# Patient Record
Sex: MALE | Race: WHITE | Employment: OTHER | URBAN - NONMETROPOLITAN AREA
[De-identification: names, ages, dates, MRNs, and addresses within clinical notes are randomized per-mention and may not be internally consistent; named-entity substitution may affect disease eponyms.]

---

## 2017-02-02 ENCOUNTER — OFFICE VISIT (OUTPATIENT)
Dept: GASTROENTEROLOGY | Age: 72
End: 2017-02-02
Payer: MEDICARE

## 2017-02-02 VITALS
WEIGHT: 231 LBS | DIASTOLIC BLOOD PRESSURE: 72 MMHG | BODY MASS INDEX: 37.12 KG/M2 | SYSTOLIC BLOOD PRESSURE: 130 MMHG | HEIGHT: 66 IN | RESPIRATION RATE: 20 BRPM | HEART RATE: 72 BPM | OXYGEN SATURATION: 97 %

## 2017-02-02 DIAGNOSIS — K59.00 CONSTIPATION, UNSPECIFIED CONSTIPATION TYPE: ICD-10-CM

## 2017-02-02 DIAGNOSIS — R14.0 SENSATION OF GASEOUS ABDOMINAL FULLNESS: ICD-10-CM

## 2017-02-02 DIAGNOSIS — R14.0 GASEOUS ABDOMINAL DISTENTION: ICD-10-CM

## 2017-02-02 DIAGNOSIS — R14.0 ABDOMINAL BLOATING: Primary | ICD-10-CM

## 2017-02-02 DIAGNOSIS — R11.0 NAUSEA: ICD-10-CM

## 2017-02-02 PROCEDURE — 99214 OFFICE O/P EST MOD 30 MIN: CPT | Performed by: NURSE PRACTITIONER

## 2017-02-02 PROCEDURE — 1123F ACP DISCUSS/DSCN MKR DOCD: CPT | Performed by: NURSE PRACTITIONER

## 2017-02-02 PROCEDURE — G8484 FLU IMMUNIZE NO ADMIN: HCPCS | Performed by: NURSE PRACTITIONER

## 2017-02-02 PROCEDURE — G8419 CALC BMI OUT NRM PARAM NOF/U: HCPCS | Performed by: NURSE PRACTITIONER

## 2017-02-02 PROCEDURE — 1036F TOBACCO NON-USER: CPT | Performed by: NURSE PRACTITIONER

## 2017-02-02 PROCEDURE — G8598 ASA/ANTIPLAT THER USED: HCPCS | Performed by: NURSE PRACTITIONER

## 2017-02-02 PROCEDURE — 3017F COLORECTAL CA SCREEN DOC REV: CPT | Performed by: NURSE PRACTITIONER

## 2017-02-02 PROCEDURE — 4040F PNEUMOC VAC/ADMIN/RCVD: CPT | Performed by: NURSE PRACTITIONER

## 2017-02-02 PROCEDURE — G8427 DOCREV CUR MEDS BY ELIG CLIN: HCPCS | Performed by: NURSE PRACTITIONER

## 2017-02-06 ENCOUNTER — TELEPHONE (OUTPATIENT)
Dept: CARDIOLOGY | Age: 72
End: 2017-02-06

## 2017-02-06 DIAGNOSIS — I25.10 ATHEROSCLEROSIS OF NATIVE CORONARY ARTERY OF NATIVE HEART WITHOUT ANGINA PECTORIS: ICD-10-CM

## 2017-02-06 DIAGNOSIS — I25.5 CARDIOMYOPATHY, ISCHEMIC: Primary | Chronic | ICD-10-CM

## 2017-02-06 DIAGNOSIS — Z01.818 PREOPERATIVE CLEARANCE: ICD-10-CM

## 2017-02-06 DIAGNOSIS — I10 ESSENTIAL HYPERTENSION: ICD-10-CM

## 2017-02-07 ENCOUNTER — APPOINTMENT (OUTPATIENT)
Dept: CT IMAGING | Age: 72
DRG: 392 | End: 2017-02-07
Attending: FAMILY MEDICINE
Payer: MEDICARE

## 2017-02-07 ENCOUNTER — HOSPITAL ENCOUNTER (INPATIENT)
Age: 72
LOS: 3 days | Discharge: HOME | DRG: 392 | End: 2017-02-10
Attending: FAMILY MEDICINE | Admitting: FAMILY MEDICINE
Payer: MEDICARE

## 2017-02-07 ENCOUNTER — APPOINTMENT (OUTPATIENT)
Dept: GENERAL RADIOLOGY | Age: 72
DRG: 392 | End: 2017-02-07
Attending: FAMILY MEDICINE
Payer: MEDICARE

## 2017-02-07 LAB
ALBUMIN SERPL-MCNC: 3.5 G/DL (ref 3.5–5.2)
ALP BLD-CCNC: 88 U/L (ref 40–130)
ALT SERPL-CCNC: 17 U/L (ref 5–41)
AMYLASE: 38 U/L (ref 28–100)
ANION GAP SERPL CALCULATED.3IONS-SCNC: 15 MMOL/L (ref 7–19)
AST SERPL-CCNC: 20 U/L (ref 5–40)
BASOPHILS ABSOLUTE: 0.1 K/UL (ref 0–0.2)
BASOPHILS RELATIVE PERCENT: 0.9 % (ref 0–1)
BILIRUB SERPL-MCNC: <0.2 MG/DL (ref 0.2–1.2)
BILIRUBIN URINE: NEGATIVE
BLOOD, URINE: NEGATIVE
BUN BLDV-MCNC: 19 MG/DL (ref 8–23)
CALCIUM SERPL-MCNC: 8.8 MG/DL (ref 8.8–10.2)
CHLORIDE BLD-SCNC: 100 MMOL/L (ref 98–111)
CLARITY: CLEAR
CO2: 23 MMOL/L (ref 22–29)
COLOR: YELLOW
CREAT SERPL-MCNC: 1.1 MG/DL (ref 0.5–1.2)
EOSINOPHILS ABSOLUTE: 0.4 K/UL (ref 0–0.6)
EOSINOPHILS RELATIVE PERCENT: 4.1 % (ref 0–5)
GFR NON-AFRICAN AMERICAN: >60
GLOBULIN: 2.7 G/DL
GLUCOSE BLD-MCNC: 249 MG/DL (ref 70–99)
GLUCOSE BLD-MCNC: 268 MG/DL (ref 74–109)
GLUCOSE URINE: >=1000 MG/DL
HCT VFR BLD CALC: 39.8 % (ref 42–52)
HEMOGLOBIN: 12.8 G/DL (ref 14–18)
KETONES, URINE: NEGATIVE MG/DL
LEUKOCYTE ESTERASE, URINE: NEGATIVE
LIPASE: 40 U/L (ref 13–60)
LYMPHOCYTES ABSOLUTE: 1.6 K/UL (ref 1.1–4.5)
LYMPHOCYTES RELATIVE PERCENT: 17.5 % (ref 20–40)
MCH RBC QN AUTO: 24.5 PG (ref 27–31)
MCHC RBC AUTO-ENTMCNC: 32.2 G/DL (ref 33–37)
MCV RBC AUTO: 76.2 FL (ref 80–94)
MONOCYTES ABSOLUTE: 0.7 K/UL (ref 0–0.9)
MONOCYTES RELATIVE PERCENT: 7.7 % (ref 0–10)
NEUTROPHILS ABSOLUTE: 6.4 K/UL (ref 1.5–7.5)
NEUTROPHILS RELATIVE PERCENT: 69.8 % (ref 50–65)
NITRITE, URINE: NEGATIVE
PDW BLD-RTO: 14.9 % (ref 11.5–14.5)
PERFORMED ON: ABNORMAL
PH UA: 6.5
PLATELET # BLD: 286 K/UL (ref 130–400)
PMV BLD AUTO: 9.4 FL (ref 7.4–10.4)
POTASSIUM SERPL-SCNC: 4.2 MMOL/L (ref 3.5–5)
PROTEIN UA: ABNORMAL MG/DL
RBC # BLD: 5.22 M/UL (ref 4.7–6.1)
SODIUM BLD-SCNC: 138 MMOL/L (ref 136–145)
SPECIFIC GRAVITY UA: 1.02
TOTAL PROTEIN: 6.2 G/DL (ref 6.6–8.7)
UROBILINOGEN, URINE: 0.2 E.U./DL
WBC # BLD: 9.2 K/UL (ref 4.8–10.8)

## 2017-02-07 PROCEDURE — 74177 CT ABD & PELVIS W/CONTRAST: CPT

## 2017-02-07 PROCEDURE — 83690 ASSAY OF LIPASE: CPT

## 2017-02-07 PROCEDURE — 99221 1ST HOSP IP/OBS SF/LOW 40: CPT | Performed by: INTERNAL MEDICINE

## 2017-02-07 PROCEDURE — 1210000000 HC MED SURG R&B

## 2017-02-07 PROCEDURE — 6370000000 HC RX 637 (ALT 250 FOR IP): Performed by: FAMILY MEDICINE

## 2017-02-07 PROCEDURE — 6360000004 HC RX CONTRAST MEDICATION: Performed by: INTERNAL MEDICINE

## 2017-02-07 PROCEDURE — 6360000002 HC RX W HCPCS: Performed by: INTERNAL MEDICINE

## 2017-02-07 PROCEDURE — 93005 ELECTROCARDIOGRAM TRACING: CPT

## 2017-02-07 PROCEDURE — 81003 URINALYSIS AUTO W/O SCOPE: CPT

## 2017-02-07 PROCEDURE — 36415 COLL VENOUS BLD VENIPUNCTURE: CPT

## 2017-02-07 PROCEDURE — 71020 XR CHEST STANDARD TWO VW: CPT

## 2017-02-07 PROCEDURE — 80053 COMPREHEN METABOLIC PANEL: CPT

## 2017-02-07 PROCEDURE — 6360000002 HC RX W HCPCS: Performed by: FAMILY MEDICINE

## 2017-02-07 PROCEDURE — C9113 INJ PANTOPRAZOLE SODIUM, VIA: HCPCS | Performed by: INTERNAL MEDICINE

## 2017-02-07 PROCEDURE — 82948 REAGENT STRIP/BLOOD GLUCOSE: CPT

## 2017-02-07 PROCEDURE — 82150 ASSAY OF AMYLASE: CPT

## 2017-02-07 PROCEDURE — 85025 COMPLETE CBC W/AUTO DIFF WBC: CPT

## 2017-02-07 RX ORDER — PANTOPRAZOLE SODIUM 40 MG/1
40 TABLET, DELAYED RELEASE ORAL DAILY
Status: DISCONTINUED | OUTPATIENT
Start: 2017-02-07 | End: 2017-02-07 | Stop reason: SDUPTHER

## 2017-02-07 RX ORDER — POLYETHYLENE GLYCOL 3350 17 G/17G
17 POWDER, FOR SOLUTION ORAL DAILY
Status: DISCONTINUED | OUTPATIENT
Start: 2017-02-07 | End: 2017-02-10 | Stop reason: HOSPADM

## 2017-02-07 RX ORDER — POLYETHYLENE GLYCOL 3350 17 G/17G
17 POWDER, FOR SOLUTION ORAL EVERY OTHER DAY
COMMUNITY
End: 2019-10-10

## 2017-02-07 RX ORDER — SIMVASTATIN 20 MG
40 TABLET ORAL NIGHTLY
Status: DISCONTINUED | OUTPATIENT
Start: 2017-02-07 | End: 2017-02-10 | Stop reason: HOSPADM

## 2017-02-07 RX ORDER — MIRTAZAPINE 15 MG/1
15 TABLET, ORALLY DISINTEGRATING ORAL NIGHTLY
Status: DISCONTINUED | OUTPATIENT
Start: 2017-02-07 | End: 2017-02-10 | Stop reason: HOSPADM

## 2017-02-07 RX ORDER — SOTALOL HYDROCHLORIDE 80 MG/1
80 TABLET ORAL 2 TIMES DAILY
Status: DISCONTINUED | OUTPATIENT
Start: 2017-02-07 | End: 2017-02-10 | Stop reason: HOSPADM

## 2017-02-07 RX ORDER — LISINOPRIL 10 MG/1
10 TABLET ORAL DAILY
Status: DISCONTINUED | OUTPATIENT
Start: 2017-02-07 | End: 2017-02-10 | Stop reason: HOSPADM

## 2017-02-07 RX ORDER — PANTOPRAZOLE SODIUM 40 MG/10ML
40 INJECTION, POWDER, LYOPHILIZED, FOR SOLUTION INTRAVENOUS 2 TIMES DAILY
Status: DISCONTINUED | OUTPATIENT
Start: 2017-02-07 | End: 2017-02-10 | Stop reason: ALTCHOICE

## 2017-02-07 RX ORDER — ASPIRIN 81 MG/1
81 TABLET ORAL DAILY
Status: DISCONTINUED | OUTPATIENT
Start: 2017-02-07 | End: 2017-02-10 | Stop reason: HOSPADM

## 2017-02-07 RX ORDER — METOPROLOL TARTRATE 50 MG/1
50 TABLET, FILM COATED ORAL DAILY
Status: DISCONTINUED | OUTPATIENT
Start: 2017-02-07 | End: 2017-02-10 | Stop reason: HOSPADM

## 2017-02-07 RX ORDER — METFORMIN HYDROCHLORIDE 500 MG/1
500 TABLET, EXTENDED RELEASE ORAL 2 TIMES DAILY
Status: DISCONTINUED | OUTPATIENT
Start: 2017-02-07 | End: 2017-02-07

## 2017-02-07 RX ORDER — HYDROCODONE BITARTRATE AND ACETAMINOPHEN 7.5; 325 MG/1; MG/1
1 TABLET ORAL EVERY 6 HOURS PRN
Status: DISCONTINUED | OUTPATIENT
Start: 2017-02-07 | End: 2017-02-10 | Stop reason: HOSPADM

## 2017-02-07 RX ORDER — GLIPIZIDE 10 MG/1
5 TABLET ORAL 2 TIMES DAILY
Status: DISCONTINUED | OUTPATIENT
Start: 2017-02-07 | End: 2017-02-10 | Stop reason: HOSPADM

## 2017-02-07 RX ORDER — METFORMIN HYDROCHLORIDE 500 MG/1
500 TABLET, EXTENDED RELEASE ORAL 2 TIMES DAILY
Status: DISCONTINUED | OUTPATIENT
Start: 2017-02-09 | End: 2017-02-09

## 2017-02-07 RX ADMIN — PANTOPRAZOLE SODIUM 40 MG: 40 INJECTION, POWDER, FOR SOLUTION INTRAVENOUS at 17:31

## 2017-02-07 RX ADMIN — GLIPIZIDE 5 MG: 10 TABLET ORAL at 20:59

## 2017-02-07 RX ADMIN — MIRTAZAPINE 15 MG: 15 TABLET, ORALLY DISINTEGRATING ORAL at 21:01

## 2017-02-07 RX ADMIN — SIMVASTATIN 40 MG: 20 TABLET, FILM COATED ORAL at 21:01

## 2017-02-07 RX ADMIN — LISINOPRIL 10 MG: 10 TABLET ORAL at 17:29

## 2017-02-07 RX ADMIN — TAZOBACTAM SODIUM AND PIPERACILLIN SODIUM 4.5 G: 500; 4 INJECTION, SOLUTION INTRAVENOUS at 21:00

## 2017-02-07 RX ADMIN — TAZOBACTAM SODIUM AND PIPERACILLIN SODIUM 4.5 G: 500; 4 INJECTION, SOLUTION INTRAVENOUS at 17:57

## 2017-02-07 RX ADMIN — SOTALOL HYDROCHLORIDE 80 MG: 80 TABLET ORAL at 21:01

## 2017-02-07 RX ADMIN — IOVERSOL 90 ML: 741 INJECTION INTRA-ARTERIAL; INTRAVENOUS at 17:47

## 2017-02-07 RX ADMIN — METOPROLOL TARTRATE 50 MG: 50 TABLET ORAL at 17:29

## 2017-02-07 ASSESSMENT — ENCOUNTER SYMPTOMS
VOMITING: 0
DIARRHEA: 0
CONSTIPATION: 1
COUGH: 0
ALLERGIC/IMMUNOLOGIC NEGATIVE: 1
FLATUS: 1
BLOATING: 1
ABDOMINAL PAIN: 1
VOICE CHANGE: 0
EYES NEGATIVE: 1
BACK PAIN: 1
BLOOD IN STOOL: 0
CHEST TIGHTNESS: 1
NAUSEA: 1
SHORTNESS OF BREATH: 1
RECTAL PAIN: 0
SORE THROAT: 0

## 2017-02-08 ENCOUNTER — APPOINTMENT (OUTPATIENT)
Dept: GENERAL RADIOLOGY | Age: 72
DRG: 392 | End: 2017-02-08
Attending: FAMILY MEDICINE
Payer: MEDICARE

## 2017-02-08 ENCOUNTER — APPOINTMENT (OUTPATIENT)
Dept: ULTRASOUND IMAGING | Age: 72
DRG: 392 | End: 2017-02-08
Attending: FAMILY MEDICINE
Payer: MEDICARE

## 2017-02-08 LAB
ALPHA FETOPROTEIN: 2.9 NG/ML (ref 0–8.3)
C DIFFICILE TOXIN, EIA: NORMAL
GLUCOSE BLD-MCNC: 145 MG/DL (ref 70–99)
GLUCOSE BLD-MCNC: 147 MG/DL (ref 70–99)
GLUCOSE BLD-MCNC: 218 MG/DL (ref 70–99)
PERFORMED ON: ABNORMAL

## 2017-02-08 PROCEDURE — 76700 US EXAM ABDOM COMPLETE: CPT

## 2017-02-08 PROCEDURE — 1210000000 HC MED SURG R&B

## 2017-02-08 PROCEDURE — 6360000002 HC RX W HCPCS: Performed by: INTERNAL MEDICINE

## 2017-02-08 PROCEDURE — 99232 SBSQ HOSP IP/OBS MODERATE 35: CPT | Performed by: INTERNAL MEDICINE

## 2017-02-08 PROCEDURE — C9113 INJ PANTOPRAZOLE SODIUM, VIA: HCPCS | Performed by: INTERNAL MEDICINE

## 2017-02-08 PROCEDURE — 82105 ALPHA-FETOPROTEIN SERUM: CPT

## 2017-02-08 PROCEDURE — 74022 RADEX COMPL AQT ABD SERIES: CPT

## 2017-02-08 PROCEDURE — 6370000000 HC RX 637 (ALT 250 FOR IP): Performed by: FAMILY MEDICINE

## 2017-02-08 PROCEDURE — 87324 CLOSTRIDIUM AG IA: CPT

## 2017-02-08 PROCEDURE — 36415 COLL VENOUS BLD VENIPUNCTURE: CPT

## 2017-02-08 PROCEDURE — 82948 REAGENT STRIP/BLOOD GLUCOSE: CPT

## 2017-02-08 PROCEDURE — 6360000002 HC RX W HCPCS: Performed by: FAMILY MEDICINE

## 2017-02-08 RX ORDER — DEXTROSE MONOHYDRATE 25 G/50ML
12.5 INJECTION, SOLUTION INTRAVENOUS PRN
Status: DISCONTINUED | OUTPATIENT
Start: 2017-02-08 | End: 2017-02-10 | Stop reason: HOSPADM

## 2017-02-08 RX ORDER — DEXTROSE MONOHYDRATE 50 MG/ML
100 INJECTION, SOLUTION INTRAVENOUS PRN
Status: DISCONTINUED | OUTPATIENT
Start: 2017-02-08 | End: 2017-02-10 | Stop reason: HOSPADM

## 2017-02-08 RX ORDER — NICOTINE POLACRILEX 4 MG
15 LOZENGE BUCCAL PRN
Status: DISCONTINUED | OUTPATIENT
Start: 2017-02-08 | End: 2017-02-10 | Stop reason: HOSPADM

## 2017-02-08 RX ADMIN — HYDROCODONE BITARTRATE AND ACETAMINOPHEN 1 TABLET: 7.5; 325 TABLET ORAL at 02:03

## 2017-02-08 RX ADMIN — LISINOPRIL 10 MG: 10 TABLET ORAL at 08:36

## 2017-02-08 RX ADMIN — ASPIRIN 81 MG: 81 TABLET, COATED ORAL at 08:36

## 2017-02-08 RX ADMIN — GLIPIZIDE 5 MG: 10 TABLET ORAL at 08:36

## 2017-02-08 RX ADMIN — HYDROCODONE BITARTRATE AND ACETAMINOPHEN 1 TABLET: 7.5; 325 TABLET ORAL at 22:02

## 2017-02-08 RX ADMIN — TAZOBACTAM SODIUM AND PIPERACILLIN SODIUM 4.5 G: 500; 4 INJECTION, SOLUTION INTRAVENOUS at 04:14

## 2017-02-08 RX ADMIN — PANTOPRAZOLE SODIUM 40 MG: 40 INJECTION, POWDER, FOR SOLUTION INTRAVENOUS at 08:36

## 2017-02-08 RX ADMIN — SOTALOL HYDROCHLORIDE 80 MG: 80 TABLET ORAL at 22:01

## 2017-02-08 RX ADMIN — METOPROLOL TARTRATE 50 MG: 50 TABLET ORAL at 08:36

## 2017-02-08 RX ADMIN — INSULIN LISPRO 4 UNITS: 100 INJECTION, SOLUTION INTRAVENOUS; SUBCUTANEOUS at 12:39

## 2017-02-08 RX ADMIN — MIRTAZAPINE 15 MG: 15 TABLET, ORALLY DISINTEGRATING ORAL at 22:02

## 2017-02-08 RX ADMIN — ENOXAPARIN SODIUM 40 MG: 40 INJECTION SUBCUTANEOUS at 08:36

## 2017-02-08 RX ADMIN — GLIPIZIDE 5 MG: 10 TABLET ORAL at 22:01

## 2017-02-08 RX ADMIN — PANTOPRAZOLE SODIUM 40 MG: 40 INJECTION, POWDER, FOR SOLUTION INTRAVENOUS at 22:03

## 2017-02-08 RX ADMIN — SOTALOL HYDROCHLORIDE 80 MG: 80 TABLET ORAL at 08:36

## 2017-02-08 RX ADMIN — SIMVASTATIN 40 MG: 20 TABLET, FILM COATED ORAL at 22:01

## 2017-02-08 RX ADMIN — LINACLOTIDE 290 MCG: 145 CAPSULE, GELATIN COATED ORAL at 05:35

## 2017-02-08 ASSESSMENT — PAIN SCALES - GENERAL
PAINLEVEL_OUTOF10: 10
PAINLEVEL_OUTOF10: 8

## 2017-02-09 ENCOUNTER — APPOINTMENT (OUTPATIENT)
Dept: CT IMAGING | Age: 72
DRG: 392 | End: 2017-02-09
Attending: FAMILY MEDICINE
Payer: MEDICARE

## 2017-02-09 LAB
ANION GAP SERPL CALCULATED.3IONS-SCNC: 13 MMOL/L (ref 7–19)
BASOPHILS ABSOLUTE: 0.1 K/UL (ref 0–0.2)
BASOPHILS RELATIVE PERCENT: 1.2 % (ref 0–1)
BUN BLDV-MCNC: 14 MG/DL (ref 8–23)
CALCIUM SERPL-MCNC: 8.5 MG/DL (ref 8.8–10.2)
CHLORIDE BLD-SCNC: 102 MMOL/L (ref 98–111)
CO2: 25 MMOL/L (ref 22–29)
CREAT SERPL-MCNC: 1 MG/DL (ref 0.5–1.2)
EKG P AXIS: 75 DEGREES
EKG P-R INTERVAL: 156 MS
EKG Q-T INTERVAL: 458 MS
EKG QRS DURATION: 128 MS
EKG QTC CALCULATION (BAZETT): 475 MS
EKG T AXIS: 51 DEGREES
EOSINOPHILS ABSOLUTE: 0.4 K/UL (ref 0–0.6)
EOSINOPHILS RELATIVE PERCENT: 5.4 % (ref 0–5)
GFR NON-AFRICAN AMERICAN: >60
GLUCOSE BLD-MCNC: 153 MG/DL (ref 70–99)
GLUCOSE BLD-MCNC: 183 MG/DL (ref 70–99)
GLUCOSE BLD-MCNC: 220 MG/DL (ref 70–99)
GLUCOSE BLD-MCNC: 81 MG/DL (ref 74–109)
GLUCOSE BLD-MCNC: 92 MG/DL (ref 70–99)
HCT VFR BLD CALC: 39.5 % (ref 42–52)
HEMOGLOBIN: 12.3 G/DL (ref 14–18)
LYMPHOCYTES ABSOLUTE: 2 K/UL (ref 1.1–4.5)
LYMPHOCYTES RELATIVE PERCENT: 29 % (ref 20–40)
MCH RBC QN AUTO: 24.5 PG (ref 27–31)
MCHC RBC AUTO-ENTMCNC: 31.1 G/DL (ref 33–37)
MCV RBC AUTO: 78.5 FL (ref 80–94)
MONOCYTES ABSOLUTE: 0.5 K/UL (ref 0–0.9)
MONOCYTES RELATIVE PERCENT: 7.9 % (ref 0–10)
NEUTROPHILS ABSOLUTE: 3.8 K/UL (ref 1.5–7.5)
NEUTROPHILS RELATIVE PERCENT: 56.2 % (ref 50–65)
PDW BLD-RTO: 14.8 % (ref 11.5–14.5)
PERFORMED ON: ABNORMAL
PERFORMED ON: NORMAL
PLATELET # BLD: 243 K/UL (ref 130–400)
PMV BLD AUTO: 9 FL (ref 7.4–10.4)
POTASSIUM SERPL-SCNC: 3.7 MMOL/L (ref 3.5–5)
RBC # BLD: 5.03 M/UL (ref 4.7–6.1)
SODIUM BLD-SCNC: 140 MMOL/L (ref 136–145)
WBC # BLD: 6.7 K/UL (ref 4.8–10.8)

## 2017-02-09 PROCEDURE — 74177 CT ABD & PELVIS W/CONTRAST: CPT

## 2017-02-09 PROCEDURE — 6370000000 HC RX 637 (ALT 250 FOR IP): Performed by: INTERNAL MEDICINE

## 2017-02-09 PROCEDURE — 6370000000 HC RX 637 (ALT 250 FOR IP): Performed by: FAMILY MEDICINE

## 2017-02-09 PROCEDURE — C9113 INJ PANTOPRAZOLE SODIUM, VIA: HCPCS | Performed by: INTERNAL MEDICINE

## 2017-02-09 PROCEDURE — 85025 COMPLETE CBC W/AUTO DIFF WBC: CPT

## 2017-02-09 PROCEDURE — 6360000002 HC RX W HCPCS: Performed by: FAMILY MEDICINE

## 2017-02-09 PROCEDURE — 36415 COLL VENOUS BLD VENIPUNCTURE: CPT

## 2017-02-09 PROCEDURE — 6360000002 HC RX W HCPCS: Performed by: INTERNAL MEDICINE

## 2017-02-09 PROCEDURE — G0378 HOSPITAL OBSERVATION PER HR: HCPCS

## 2017-02-09 PROCEDURE — 99232 SBSQ HOSP IP/OBS MODERATE 35: CPT | Performed by: INTERNAL MEDICINE

## 2017-02-09 PROCEDURE — 6360000004 HC RX CONTRAST MEDICATION: Performed by: INTERNAL MEDICINE

## 2017-02-09 PROCEDURE — 82948 REAGENT STRIP/BLOOD GLUCOSE: CPT

## 2017-02-09 PROCEDURE — 80048 BASIC METABOLIC PNL TOTAL CA: CPT

## 2017-02-09 PROCEDURE — 1210000000 HC MED SURG R&B

## 2017-02-09 RX ORDER — METFORMIN HYDROCHLORIDE 500 MG/1
500 TABLET, EXTENDED RELEASE ORAL 2 TIMES DAILY
Status: DISCONTINUED | OUTPATIENT
Start: 2017-02-11 | End: 2017-02-10 | Stop reason: HOSPADM

## 2017-02-09 RX ADMIN — ASPIRIN 81 MG: 81 TABLET, COATED ORAL at 09:23

## 2017-02-09 RX ADMIN — MIRTAZAPINE 15 MG: 15 TABLET, ORALLY DISINTEGRATING ORAL at 20:42

## 2017-02-09 RX ADMIN — ENOXAPARIN SODIUM 40 MG: 40 INJECTION SUBCUTANEOUS at 09:24

## 2017-02-09 RX ADMIN — SOTALOL HYDROCHLORIDE 80 MG: 80 TABLET ORAL at 09:23

## 2017-02-09 RX ADMIN — HYDROCODONE BITARTRATE AND ACETAMINOPHEN 1 TABLET: 7.5; 325 TABLET ORAL at 20:43

## 2017-02-09 RX ADMIN — METOPROLOL TARTRATE 50 MG: 50 TABLET ORAL at 09:23

## 2017-02-09 RX ADMIN — IOVERSOL 90 ML: 741 INJECTION INTRA-ARTERIAL; INTRAVENOUS at 16:16

## 2017-02-09 RX ADMIN — GLIPIZIDE 5 MG: 10 TABLET ORAL at 09:24

## 2017-02-09 RX ADMIN — NALOXEGOL OXALATE 25 MG: 12.5 TABLET, FILM COATED ORAL at 09:23

## 2017-02-09 RX ADMIN — PANTOPRAZOLE SODIUM 40 MG: 40 INJECTION, POWDER, FOR SOLUTION INTRAVENOUS at 09:23

## 2017-02-09 RX ADMIN — SIMVASTATIN 40 MG: 20 TABLET, FILM COATED ORAL at 20:42

## 2017-02-09 RX ADMIN — SOTALOL HYDROCHLORIDE 80 MG: 80 TABLET ORAL at 20:42

## 2017-02-09 RX ADMIN — PANTOPRAZOLE SODIUM 40 MG: 40 INJECTION, POWDER, FOR SOLUTION INTRAVENOUS at 20:42

## 2017-02-09 RX ADMIN — LISINOPRIL 10 MG: 10 TABLET ORAL at 09:23

## 2017-02-09 RX ADMIN — INSULIN LISPRO 4 UNITS: 100 INJECTION, SOLUTION INTRAVENOUS; SUBCUTANEOUS at 16:58

## 2017-02-09 RX ADMIN — GLIPIZIDE 5 MG: 10 TABLET ORAL at 20:43

## 2017-02-09 ASSESSMENT — PAIN SCALES - GENERAL
PAINLEVEL_OUTOF10: 6
PAINLEVEL_OUTOF10: 1

## 2017-02-10 VITALS
TEMPERATURE: 97.1 F | DIASTOLIC BLOOD PRESSURE: 89 MMHG | HEART RATE: 68 BPM | OXYGEN SATURATION: 95 % | HEIGHT: 66 IN | BODY MASS INDEX: 37.12 KG/M2 | SYSTOLIC BLOOD PRESSURE: 172 MMHG | WEIGHT: 231 LBS | RESPIRATION RATE: 16 BRPM

## 2017-02-10 PROBLEM — R10.9 ABDOMINAL PAIN: Status: ACTIVE | Noted: 2017-02-10

## 2017-02-10 PROBLEM — R16.0 LIVER MASS: Status: ACTIVE | Noted: 2017-02-10

## 2017-02-10 PROBLEM — K59.09 CHRONIC CONSTIPATION: Status: ACTIVE | Noted: 2017-02-10

## 2017-02-10 LAB
GLUCOSE BLD-MCNC: 169 MG/DL (ref 70–99)
PERFORMED ON: ABNORMAL

## 2017-02-10 PROCEDURE — G0378 HOSPITAL OBSERVATION PER HR: HCPCS

## 2017-02-10 PROCEDURE — 6370000000 HC RX 637 (ALT 250 FOR IP): Performed by: FAMILY MEDICINE

## 2017-02-10 PROCEDURE — 6370000000 HC RX 637 (ALT 250 FOR IP): Performed by: INTERNAL MEDICINE

## 2017-02-10 PROCEDURE — 82948 REAGENT STRIP/BLOOD GLUCOSE: CPT

## 2017-02-10 RX ORDER — PANTOPRAZOLE SODIUM 40 MG/1
40 TABLET, DELAYED RELEASE ORAL
Status: DISCONTINUED | OUTPATIENT
Start: 2017-02-10 | End: 2017-02-10 | Stop reason: HOSPADM

## 2017-02-10 RX ADMIN — ASPIRIN 81 MG: 81 TABLET, COATED ORAL at 09:40

## 2017-02-10 RX ADMIN — PANTOPRAZOLE SODIUM 40 MG: 40 TABLET, DELAYED RELEASE ORAL at 05:34

## 2017-02-10 RX ADMIN — LISINOPRIL 10 MG: 10 TABLET ORAL at 09:43

## 2017-02-10 RX ADMIN — METOPROLOL TARTRATE 50 MG: 50 TABLET ORAL at 09:41

## 2017-02-10 RX ADMIN — SOTALOL HYDROCHLORIDE 80 MG: 80 TABLET ORAL at 09:40

## 2017-02-10 RX ADMIN — NALOXEGOL OXALATE 25 MG: 12.5 TABLET, FILM COATED ORAL at 09:40

## 2017-02-10 RX ADMIN — GLIPIZIDE 5 MG: 10 TABLET ORAL at 05:00

## 2017-02-13 ENCOUNTER — HOSPITAL ENCOUNTER (OUTPATIENT)
Dept: NON INVASIVE DIAGNOSTICS | Age: 72
Discharge: HOME | End: 2017-02-13
Payer: MEDICARE

## 2017-02-13 ENCOUNTER — HOSPITAL ENCOUNTER (OUTPATIENT)
Dept: NUCLEAR MEDICINE | Age: 72
Discharge: HOME | End: 2017-02-13
Payer: MEDICARE

## 2017-02-13 DIAGNOSIS — I25.10 ATHEROSCLEROSIS OF NATIVE CORONARY ARTERY OF NATIVE HEART WITHOUT ANGINA PECTORIS: ICD-10-CM

## 2017-02-13 DIAGNOSIS — I25.5 CARDIOMYOPATHY, ISCHEMIC: Chronic | ICD-10-CM

## 2017-02-13 DIAGNOSIS — Z01.818 PREOPERATIVE CLEARANCE: ICD-10-CM

## 2017-02-13 DIAGNOSIS — I10 ESSENTIAL HYPERTENSION: ICD-10-CM

## 2017-02-13 PROCEDURE — 3430000000 HC RX DIAGNOSTIC RADIOPHARMACEUTICAL: Performed by: NURSE PRACTITIONER

## 2017-02-13 PROCEDURE — 6360000002 HC RX W HCPCS: Performed by: NURSE PRACTITIONER

## 2017-02-13 PROCEDURE — A9500 TC99M SESTAMIBI: HCPCS | Performed by: NURSE PRACTITIONER

## 2017-02-13 PROCEDURE — 93017 CV STRESS TEST TRACING ONLY: CPT

## 2017-02-13 PROCEDURE — 78452 HT MUSCLE IMAGE SPECT MULT: CPT

## 2017-02-13 RX ADMIN — TETRAKIS(2-METHOXYISOBUTYLISOCYANIDE)COPPER(I) TETRAFLUOROBORATE 30 MILLICURIE: 1 INJECTION, POWDER, LYOPHILIZED, FOR SOLUTION INTRAVENOUS at 10:27

## 2017-02-13 RX ADMIN — TETRAKIS(2-METHOXYISOBUTYLISOCYANIDE)COPPER(I) TETRAFLUOROBORATE 10 MILLICURIE: 1 INJECTION, POWDER, LYOPHILIZED, FOR SOLUTION INTRAVENOUS at 10:26

## 2017-02-13 RX ADMIN — REGADENOSON 0.4 MG: 0.08 INJECTION, SOLUTION INTRAVENOUS at 10:27

## 2017-02-16 PROCEDURE — 78452 HT MUSCLE IMAGE SPECT MULT: CPT | Performed by: INTERNAL MEDICINE

## 2017-02-16 PROCEDURE — 93016 CV STRESS TEST SUPVJ ONLY: CPT | Performed by: INTERNAL MEDICINE

## 2017-02-16 PROCEDURE — 93018 CV STRESS TEST I&R ONLY: CPT | Performed by: INTERNAL MEDICINE

## 2017-02-17 ENCOUNTER — TELEPHONE (OUTPATIENT)
Dept: GASTROENTEROLOGY | Age: 72
End: 2017-02-17

## 2017-02-27 ENCOUNTER — OFFICE VISIT (OUTPATIENT)
Dept: CARDIOLOGY | Age: 72
End: 2017-02-27
Payer: MEDICARE

## 2017-02-27 VITALS
BODY MASS INDEX: 37.12 KG/M2 | WEIGHT: 230 LBS | HEART RATE: 72 BPM | DIASTOLIC BLOOD PRESSURE: 70 MMHG | SYSTOLIC BLOOD PRESSURE: 116 MMHG

## 2017-02-27 DIAGNOSIS — Z95.810 ICD (IMPLANTABLE CARDIOVERTER-DEFIBRILLATOR) IN PLACE: ICD-10-CM

## 2017-02-27 DIAGNOSIS — I10 ESSENTIAL HYPERTENSION: ICD-10-CM

## 2017-02-27 DIAGNOSIS — I25.5 CARDIOMYOPATHY, ISCHEMIC: Primary | Chronic | ICD-10-CM

## 2017-02-27 DIAGNOSIS — I25.10 CORONARY ARTERY DISEASE INVOLVING NATIVE CORONARY ARTERY OF NATIVE HEART WITHOUT ANGINA PECTORIS: ICD-10-CM

## 2017-02-27 DIAGNOSIS — E78.2 MIXED HYPERLIPIDEMIA: ICD-10-CM

## 2017-02-27 PROCEDURE — 99213 OFFICE O/P EST LOW 20 MIN: CPT | Performed by: NURSE PRACTITIONER

## 2017-02-27 PROCEDURE — 1111F DSCHRG MED/CURRENT MED MERGE: CPT | Performed by: NURSE PRACTITIONER

## 2017-02-27 PROCEDURE — 3017F COLORECTAL CA SCREEN DOC REV: CPT | Performed by: NURSE PRACTITIONER

## 2017-02-27 PROCEDURE — G8427 DOCREV CUR MEDS BY ELIG CLIN: HCPCS | Performed by: NURSE PRACTITIONER

## 2017-02-27 PROCEDURE — 93282 PRGRMG EVAL IMPLANTABLE DFB: CPT | Performed by: NURSE PRACTITIONER

## 2017-02-27 PROCEDURE — G8484 FLU IMMUNIZE NO ADMIN: HCPCS | Performed by: NURSE PRACTITIONER

## 2017-02-27 PROCEDURE — 4040F PNEUMOC VAC/ADMIN/RCVD: CPT | Performed by: NURSE PRACTITIONER

## 2017-02-27 PROCEDURE — 1123F ACP DISCUSS/DSCN MKR DOCD: CPT | Performed by: NURSE PRACTITIONER

## 2017-02-27 PROCEDURE — 1036F TOBACCO NON-USER: CPT | Performed by: NURSE PRACTITIONER

## 2017-02-27 PROCEDURE — G8598 ASA/ANTIPLAT THER USED: HCPCS | Performed by: NURSE PRACTITIONER

## 2017-02-27 PROCEDURE — G8417 CALC BMI ABV UP PARAM F/U: HCPCS | Performed by: NURSE PRACTITIONER

## 2017-02-28 PROBLEM — I25.10 CORONARY ARTERY DISEASE INVOLVING NATIVE CORONARY ARTERY OF NATIVE HEART WITHOUT ANGINA PECTORIS: Status: ACTIVE | Noted: 2017-02-28

## 2017-03-01 ENCOUNTER — OFFICE VISIT (OUTPATIENT)
Dept: GASTROENTEROLOGY | Age: 72
End: 2017-03-01
Payer: MEDICARE

## 2017-03-01 VITALS
BODY MASS INDEX: 36.35 KG/M2 | WEIGHT: 231.6 LBS | SYSTOLIC BLOOD PRESSURE: 118 MMHG | HEIGHT: 67 IN | OXYGEN SATURATION: 92 % | HEART RATE: 71 BPM | DIASTOLIC BLOOD PRESSURE: 68 MMHG

## 2017-03-01 DIAGNOSIS — R14.0 ABDOMINAL BLOATING: ICD-10-CM

## 2017-03-01 DIAGNOSIS — K59.00 CONSTIPATION, UNSPECIFIED CONSTIPATION TYPE: Primary | ICD-10-CM

## 2017-03-01 PROCEDURE — 1111F DSCHRG MED/CURRENT MED MERGE: CPT | Performed by: NURSE PRACTITIONER

## 2017-03-01 PROCEDURE — 4040F PNEUMOC VAC/ADMIN/RCVD: CPT | Performed by: NURSE PRACTITIONER

## 2017-03-01 PROCEDURE — 1036F TOBACCO NON-USER: CPT | Performed by: NURSE PRACTITIONER

## 2017-03-01 PROCEDURE — G8427 DOCREV CUR MEDS BY ELIG CLIN: HCPCS | Performed by: NURSE PRACTITIONER

## 2017-03-01 PROCEDURE — 1123F ACP DISCUSS/DSCN MKR DOCD: CPT | Performed by: NURSE PRACTITIONER

## 2017-03-01 PROCEDURE — 3017F COLORECTAL CA SCREEN DOC REV: CPT | Performed by: NURSE PRACTITIONER

## 2017-03-01 PROCEDURE — G8484 FLU IMMUNIZE NO ADMIN: HCPCS | Performed by: NURSE PRACTITIONER

## 2017-03-01 PROCEDURE — G8417 CALC BMI ABV UP PARAM F/U: HCPCS | Performed by: NURSE PRACTITIONER

## 2017-03-01 PROCEDURE — 99213 OFFICE O/P EST LOW 20 MIN: CPT | Performed by: NURSE PRACTITIONER

## 2017-03-01 PROCEDURE — G8598 ASA/ANTIPLAT THER USED: HCPCS | Performed by: NURSE PRACTITIONER

## 2017-03-06 ENCOUNTER — SURG/PROC ORDERS (OUTPATIENT)
Dept: CARDIOLOGY | Age: 72
End: 2017-03-06

## 2017-03-06 ENCOUNTER — TELEPHONE (OUTPATIENT)
Dept: CARDIOLOGY | Age: 72
End: 2017-03-06

## 2017-03-06 DIAGNOSIS — R94.39 ABNORMAL NUCLEAR STRESS TEST: Primary | ICD-10-CM

## 2017-03-06 RX ORDER — SODIUM CHLORIDE 9 MG/ML
INJECTION, SOLUTION INTRAVENOUS CONTINUOUS
Status: CANCELLED | OUTPATIENT
Start: 2017-03-06

## 2017-03-07 ASSESSMENT — ENCOUNTER SYMPTOMS
COUGH: 0
EYES NEGATIVE: 1
VOICE CHANGE: 0
DIARRHEA: 0
ABDOMINAL PAIN: 0
RECTAL PAIN: 0
CHEST TIGHTNESS: 0
SHORTNESS OF BREATH: 0
VOMITING: 0
CONSTIPATION: 0
ALLERGIC/IMMUNOLOGIC NEGATIVE: 1
SORE THROAT: 0
BACK PAIN: 0
BLOOD IN STOOL: 0
NAUSEA: 0

## 2017-03-15 ENCOUNTER — APPOINTMENT (OUTPATIENT)
Dept: GENERAL RADIOLOGY | Age: 72
End: 2017-03-15
Attending: INTERNAL MEDICINE
Payer: MEDICARE

## 2017-03-15 ENCOUNTER — HOSPITAL ENCOUNTER (OUTPATIENT)
Dept: CARDIAC CATH/INVASIVE PROCEDURES | Age: 72
Discharge: HOME | End: 2017-03-15
Attending: INTERNAL MEDICINE | Admitting: INTERNAL MEDICINE
Payer: MEDICARE

## 2017-03-15 VITALS
OXYGEN SATURATION: 95 % | HEART RATE: 69 BPM | RESPIRATION RATE: 16 BRPM | TEMPERATURE: 97.1 F | BODY MASS INDEX: 35.36 KG/M2 | HEIGHT: 66 IN | WEIGHT: 220 LBS | SYSTOLIC BLOOD PRESSURE: 134 MMHG | DIASTOLIC BLOOD PRESSURE: 84 MMHG

## 2017-03-15 DIAGNOSIS — R94.39 ABNORMAL NUCLEAR STRESS TEST: ICD-10-CM

## 2017-03-15 LAB
ANION GAP SERPL CALCULATED.3IONS-SCNC: 11 MMOL/L (ref 7–19)
BUN BLDV-MCNC: 20 MG/DL (ref 8–23)
CALCIUM SERPL-MCNC: 8.9 MG/DL (ref 8.8–10.2)
CHLORIDE BLD-SCNC: 105 MMOL/L (ref 98–111)
CO2: 25 MMOL/L (ref 22–29)
CREAT SERPL-MCNC: 1.1 MG/DL (ref 0.5–1.2)
GFR NON-AFRICAN AMERICAN: >60
GLUCOSE BLD-MCNC: 121 MG/DL (ref 74–109)
HCT VFR BLD CALC: 40.5 % (ref 42–52)
HEMOGLOBIN: 13 G/DL (ref 14–18)
MCH RBC QN AUTO: 24.3 PG (ref 27–31)
MCHC RBC AUTO-ENTMCNC: 32.1 G/DL (ref 33–37)
MCV RBC AUTO: 75.7 FL (ref 80–94)
PDW BLD-RTO: 14.9 % (ref 11.5–14.5)
PLATELET # BLD: 308 K/UL (ref 130–400)
PMV BLD AUTO: 9.1 FL (ref 7.4–10.4)
POTASSIUM SERPL-SCNC: 4.5 MMOL/L (ref 3.5–5)
RBC # BLD: 5.35 M/UL (ref 4.7–6.1)
SODIUM BLD-SCNC: 141 MMOL/L (ref 136–145)
WBC # BLD: 10.1 K/UL (ref 4.8–10.8)

## 2017-03-15 PROCEDURE — 2500000003 HC RX 250 WO HCPCS

## 2017-03-15 PROCEDURE — 93005 ELECTROCARDIOGRAM TRACING: CPT

## 2017-03-15 PROCEDURE — 2709999900 HC NON-CHARGEABLE SUPPLY

## 2017-03-15 PROCEDURE — 2580000003 HC RX 258: Performed by: INTERNAL MEDICINE

## 2017-03-15 PROCEDURE — C8928 TTE W OR W/O FOL W/CON,STRES: HCPCS

## 2017-03-15 PROCEDURE — 2720000001 HC MISC SURG SUPPLY STERILE $51-500

## 2017-03-15 PROCEDURE — 6360000002 HC RX W HCPCS

## 2017-03-15 PROCEDURE — C1887 CATHETER, GUIDING: HCPCS

## 2017-03-15 PROCEDURE — 93459 L HRT ART/GRFT ANGIO: CPT | Performed by: INTERNAL MEDICINE

## 2017-03-15 PROCEDURE — C1894 INTRO/SHEATH, NON-LASER: HCPCS

## 2017-03-15 PROCEDURE — 71020 XR CHEST STANDARD TWO VW: CPT

## 2017-03-15 PROCEDURE — 36415 COLL VENOUS BLD VENIPUNCTURE: CPT

## 2017-03-15 PROCEDURE — 85027 COMPLETE CBC AUTOMATED: CPT

## 2017-03-15 PROCEDURE — 6360000004 HC RX CONTRAST MEDICATION: Performed by: INTERNAL MEDICINE

## 2017-03-15 PROCEDURE — C8929 TTE W OR WO FOL WCON,DOPPLER: HCPCS

## 2017-03-15 PROCEDURE — 80048 BASIC METABOLIC PNL TOTAL CA: CPT

## 2017-03-15 RX ORDER — NAPROXEN 500 MG/1
500 TABLET ORAL DAILY
Status: DISCONTINUED | OUTPATIENT
Start: 2017-03-15 | End: 2017-03-15 | Stop reason: HOSPADM

## 2017-03-15 RX ORDER — METOPROLOL TARTRATE 50 MG/1
50 TABLET, FILM COATED ORAL DAILY
Status: DISCONTINUED | OUTPATIENT
Start: 2017-03-16 | End: 2017-03-15 | Stop reason: HOSPADM

## 2017-03-15 RX ORDER — LISINOPRIL 10 MG/1
10 TABLET ORAL DAILY
Status: DISCONTINUED | OUTPATIENT
Start: 2017-03-16 | End: 2017-03-15 | Stop reason: HOSPADM

## 2017-03-15 RX ORDER — SODIUM CHLORIDE 0.9 % (FLUSH) 0.9 %
10 SYRINGE (ML) INJECTION EVERY 12 HOURS SCHEDULED
Status: DISCONTINUED | OUTPATIENT
Start: 2017-03-15 | End: 2017-03-15 | Stop reason: HOSPADM

## 2017-03-15 RX ORDER — MIRTAZAPINE 15 MG/1
15 TABLET, ORALLY DISINTEGRATING ORAL NIGHTLY
Status: DISCONTINUED | OUTPATIENT
Start: 2017-03-15 | End: 2017-03-15 | Stop reason: HOSPADM

## 2017-03-15 RX ORDER — GLIPIZIDE 5 MG/1
5 TABLET ORAL 2 TIMES DAILY
Status: DISCONTINUED | OUTPATIENT
Start: 2017-03-15 | End: 2017-03-15 | Stop reason: HOSPADM

## 2017-03-15 RX ORDER — SOTALOL HYDROCHLORIDE 80 MG/1
80 TABLET ORAL 2 TIMES DAILY
Status: DISCONTINUED | OUTPATIENT
Start: 2017-03-15 | End: 2017-03-15 | Stop reason: HOSPADM

## 2017-03-15 RX ORDER — SODIUM CHLORIDE 9 MG/ML
INJECTION, SOLUTION INTRAVENOUS CONTINUOUS
Status: DISCONTINUED | OUTPATIENT
Start: 2017-03-15 | End: 2017-03-15 | Stop reason: HOSPADM

## 2017-03-15 RX ORDER — POLYETHYLENE GLYCOL 3350 17 G/17G
17 POWDER, FOR SOLUTION ORAL DAILY
Status: DISCONTINUED | OUTPATIENT
Start: 2017-03-15 | End: 2017-03-15 | Stop reason: HOSPADM

## 2017-03-15 RX ORDER — SIMVASTATIN 40 MG
40 TABLET ORAL NIGHTLY
Status: DISCONTINUED | OUTPATIENT
Start: 2017-03-15 | End: 2017-03-15 | Stop reason: HOSPADM

## 2017-03-15 RX ORDER — ASPIRIN 81 MG/1
81 TABLET ORAL DAILY
Status: DISCONTINUED | OUTPATIENT
Start: 2017-03-15 | End: 2017-03-15 | Stop reason: HOSPADM

## 2017-03-15 RX ORDER — SODIUM CHLORIDE 9 MG/ML
INJECTION, SOLUTION INTRAVENOUS CONTINUOUS
Status: ACTIVE | OUTPATIENT
Start: 2017-03-15 | End: 2017-03-15

## 2017-03-15 RX ORDER — SODIUM CHLORIDE 0.9 % (FLUSH) 0.9 %
10 SYRINGE (ML) INJECTION PRN
Status: DISCONTINUED | OUTPATIENT
Start: 2017-03-15 | End: 2017-03-15 | Stop reason: HOSPADM

## 2017-03-15 RX ORDER — PANTOPRAZOLE SODIUM 40 MG/1
40 TABLET, DELAYED RELEASE ORAL DAILY
Status: DISCONTINUED | OUTPATIENT
Start: 2017-03-15 | End: 2017-03-15 | Stop reason: HOSPADM

## 2017-03-15 RX ADMIN — PERFLUTREN 1.5 ML: 6.52 INJECTION, SUSPENSION INTRAVENOUS at 13:53

## 2017-03-15 RX ADMIN — Medication 10 ML: at 13:53

## 2017-03-15 RX ADMIN — SODIUM CHLORIDE: 9 INJECTION, SOLUTION INTRAVENOUS at 09:01

## 2017-03-16 LAB
EKG P AXIS: 64 DEGREES
EKG P-R INTERVAL: 160 MS
EKG Q-T INTERVAL: 466 MS
EKG QRS DURATION: 132 MS
EKG QTC CALCULATION (BAZETT): 480 MS
EKG T AXIS: 85 DEGREES

## 2017-03-21 ENCOUNTER — TELEPHONE (OUTPATIENT)
Dept: GASTROENTEROLOGY | Age: 72
End: 2017-03-21

## 2017-03-21 ENCOUNTER — TELEPHONE (OUTPATIENT)
Dept: CARDIOLOGY | Age: 72
End: 2017-03-21

## 2017-03-23 ENCOUNTER — TELEPHONE (OUTPATIENT)
Dept: GASTROENTEROLOGY | Age: 72
End: 2017-03-23

## 2017-04-03 ENCOUNTER — ANESTHESIA EVENT (OUTPATIENT)
Dept: ENDOSCOPY | Age: 72
End: 2017-04-03
Payer: MEDICARE

## 2017-04-03 ENCOUNTER — HOSPITAL ENCOUNTER (OUTPATIENT)
Age: 72
Setting detail: OUTPATIENT SURGERY
Discharge: HOME | End: 2017-04-03
Attending: INTERNAL MEDICINE | Admitting: INTERNAL MEDICINE
Payer: MEDICARE

## 2017-04-03 ENCOUNTER — ANESTHESIA (OUTPATIENT)
Dept: ENDOSCOPY | Age: 72
End: 2017-04-03
Payer: MEDICARE

## 2017-04-03 VITALS
SYSTOLIC BLOOD PRESSURE: 121 MMHG | BODY MASS INDEX: 35 KG/M2 | RESPIRATION RATE: 18 BRPM | TEMPERATURE: 96.5 F | OXYGEN SATURATION: 96 % | WEIGHT: 223 LBS | HEART RATE: 74 BPM | DIASTOLIC BLOOD PRESSURE: 86 MMHG | HEIGHT: 67 IN

## 2017-04-03 VITALS
SYSTOLIC BLOOD PRESSURE: 100 MMHG | OXYGEN SATURATION: 90 % | RESPIRATION RATE: 19 BRPM | DIASTOLIC BLOOD PRESSURE: 61 MMHG

## 2017-04-03 LAB
GLUCOSE BLD-MCNC: 91 MG/DL (ref 70–99)
PERFORMED ON: NORMAL

## 2017-04-03 PROCEDURE — 43239 EGD BIOPSY SINGLE/MULTIPLE: CPT | Performed by: INTERNAL MEDICINE

## 2017-04-03 PROCEDURE — 7100000011 HC PHASE II RECOVERY - ADDTL 15 MIN: Performed by: INTERNAL MEDICINE

## 2017-04-03 PROCEDURE — 82948 REAGENT STRIP/BLOOD GLUCOSE: CPT

## 2017-04-03 PROCEDURE — 7100000010 HC PHASE II RECOVERY - FIRST 15 MIN: Performed by: INTERNAL MEDICINE

## 2017-04-03 PROCEDURE — 88305 TISSUE EXAM BY PATHOLOGIST: CPT

## 2017-04-03 PROCEDURE — 2720000001 HC MISC SURG SUPPLY STERILE $51-500: Performed by: INTERNAL MEDICINE

## 2017-04-03 PROCEDURE — 3609010300 HC COLONOSCOPY W/BIOPSY SINGLE/MULTIPLE: Performed by: INTERNAL MEDICINE

## 2017-04-03 PROCEDURE — 6360000002 HC RX W HCPCS: Performed by: NURSE ANESTHETIST, CERTIFIED REGISTERED

## 2017-04-03 PROCEDURE — 2500000003 HC RX 250 WO HCPCS: Performed by: INTERNAL MEDICINE

## 2017-04-03 PROCEDURE — 2500000003 HC RX 250 WO HCPCS: Performed by: NURSE ANESTHETIST, CERTIFIED REGISTERED

## 2017-04-03 PROCEDURE — 3609012400 HC EGD TRANSORAL BIOPSY SINGLE/MULTIPLE: Performed by: INTERNAL MEDICINE

## 2017-04-03 PROCEDURE — 45385 COLONOSCOPY W/LESION REMOVAL: CPT | Performed by: INTERNAL MEDICINE

## 2017-04-03 PROCEDURE — 3700000000 HC ANESTHESIA ATTENDED CARE: Performed by: INTERNAL MEDICINE

## 2017-04-03 PROCEDURE — 2580000003 HC RX 258: Performed by: INTERNAL MEDICINE

## 2017-04-03 PROCEDURE — 3700000001 HC ADD 15 MINUTES (ANESTHESIA): Performed by: INTERNAL MEDICINE

## 2017-04-03 RX ORDER — LIDOCAINE HYDROCHLORIDE 10 MG/ML
INJECTION, SOLUTION EPIDURAL; INFILTRATION; INTRACAUDAL; PERINEURAL PRN
Status: DISCONTINUED | OUTPATIENT
Start: 2017-04-03 | End: 2017-04-03 | Stop reason: SDUPTHER

## 2017-04-03 RX ORDER — PROPOFOL 10 MG/ML
INJECTION, EMULSION INTRAVENOUS PRN
Status: DISCONTINUED | OUTPATIENT
Start: 2017-04-03 | End: 2017-04-03 | Stop reason: SDUPTHER

## 2017-04-03 RX ORDER — ONDANSETRON 2 MG/ML
4 INJECTION INTRAMUSCULAR; INTRAVENOUS
Status: DISCONTINUED | OUTPATIENT
Start: 2017-04-03 | End: 2017-04-03 | Stop reason: HOSPADM

## 2017-04-03 RX ORDER — LIDOCAINE HYDROCHLORIDE 10 MG/ML
1 INJECTION, SOLUTION EPIDURAL; INFILTRATION; INTRACAUDAL; PERINEURAL ONCE
Status: COMPLETED | OUTPATIENT
Start: 2017-04-03 | End: 2017-04-03

## 2017-04-03 RX ORDER — SODIUM CHLORIDE, SODIUM LACTATE, POTASSIUM CHLORIDE, CALCIUM CHLORIDE 600; 310; 30; 20 MG/100ML; MG/100ML; MG/100ML; MG/100ML
INJECTION, SOLUTION INTRAVENOUS CONTINUOUS
Status: DISCONTINUED | OUTPATIENT
Start: 2017-04-03 | End: 2017-04-03 | Stop reason: HOSPADM

## 2017-04-03 RX ORDER — DIPHENHYDRAMINE HYDROCHLORIDE 50 MG/ML
12.5 INJECTION INTRAMUSCULAR; INTRAVENOUS
Status: DISCONTINUED | OUTPATIENT
Start: 2017-04-03 | End: 2017-04-03 | Stop reason: HOSPADM

## 2017-04-03 RX ADMIN — LIDOCAINE HYDROCHLORIDE 5 ML: 10 INJECTION, SOLUTION EPIDURAL; INFILTRATION; INTRACAUDAL; PERINEURAL at 09:50

## 2017-04-03 RX ADMIN — PROPOFOL 300 MG: 10 INJECTION, EMULSION INTRAVENOUS at 09:50

## 2017-04-03 RX ADMIN — LIDOCAINE HYDROCHLORIDE 1 ML: 10 INJECTION, SOLUTION EPIDURAL; INFILTRATION; INTRACAUDAL; PERINEURAL at 08:59

## 2017-04-03 RX ADMIN — SODIUM CHLORIDE, POTASSIUM CHLORIDE, SODIUM LACTATE AND CALCIUM CHLORIDE: 600; 310; 30; 20 INJECTION, SOLUTION INTRAVENOUS at 08:59

## 2017-04-03 ASSESSMENT — ENCOUNTER SYMPTOMS: SHORTNESS OF BREATH: 1

## 2017-04-03 ASSESSMENT — PAIN SCALES - GENERAL: PAINLEVEL_OUTOF10: 0

## 2017-04-03 ASSESSMENT — PAIN - FUNCTIONAL ASSESSMENT: PAIN_FUNCTIONAL_ASSESSMENT: 0-10

## 2017-04-18 ENCOUNTER — OFFICE VISIT (OUTPATIENT)
Dept: GASTROENTEROLOGY | Age: 72
End: 2017-04-18
Payer: MEDICARE

## 2017-04-18 VITALS
BODY MASS INDEX: 35.47 KG/M2 | DIASTOLIC BLOOD PRESSURE: 82 MMHG | HEART RATE: 73 BPM | WEIGHT: 226 LBS | SYSTOLIC BLOOD PRESSURE: 138 MMHG | OXYGEN SATURATION: 93 % | HEIGHT: 67 IN

## 2017-04-18 DIAGNOSIS — K59.09 CHRONIC CONSTIPATION: ICD-10-CM

## 2017-04-18 DIAGNOSIS — R14.0 ABDOMINAL BLOATING: Primary | ICD-10-CM

## 2017-04-18 DIAGNOSIS — K76.9 LESION OF LIVER: ICD-10-CM

## 2017-04-18 PROCEDURE — 1123F ACP DISCUSS/DSCN MKR DOCD: CPT | Performed by: NURSE PRACTITIONER

## 2017-04-18 PROCEDURE — G8427 DOCREV CUR MEDS BY ELIG CLIN: HCPCS | Performed by: NURSE PRACTITIONER

## 2017-04-18 PROCEDURE — 3017F COLORECTAL CA SCREEN DOC REV: CPT | Performed by: NURSE PRACTITIONER

## 2017-04-18 PROCEDURE — 4040F PNEUMOC VAC/ADMIN/RCVD: CPT | Performed by: NURSE PRACTITIONER

## 2017-04-18 PROCEDURE — G8417 CALC BMI ABV UP PARAM F/U: HCPCS | Performed by: NURSE PRACTITIONER

## 2017-04-18 PROCEDURE — 99213 OFFICE O/P EST LOW 20 MIN: CPT | Performed by: NURSE PRACTITIONER

## 2017-04-18 PROCEDURE — G8598 ASA/ANTIPLAT THER USED: HCPCS | Performed by: NURSE PRACTITIONER

## 2017-04-18 PROCEDURE — 1036F TOBACCO NON-USER: CPT | Performed by: NURSE PRACTITIONER

## 2017-04-18 ASSESSMENT — ENCOUNTER SYMPTOMS
ABDOMINAL PAIN: 0
VOMITING: 0
RECTAL PAIN: 0
TROUBLE SWALLOWING: 0
BLOOD IN STOOL: 0
SHORTNESS OF BREATH: 1
VOICE CHANGE: 0
NAUSEA: 0
DIARRHEA: 0
CHOKING: 0
ABDOMINAL DISTENTION: 0
COUGH: 1
CONSTIPATION: 0

## 2017-05-10 ENCOUNTER — OFFICE VISIT (OUTPATIENT)
Dept: CARDIOLOGY | Age: 72
End: 2017-05-10
Payer: MEDICARE

## 2017-05-10 VITALS
WEIGHT: 228 LBS | SYSTOLIC BLOOD PRESSURE: 110 MMHG | BODY MASS INDEX: 36.25 KG/M2 | DIASTOLIC BLOOD PRESSURE: 70 MMHG | HEART RATE: 76 BPM

## 2017-05-10 DIAGNOSIS — I25.5 CARDIOMYOPATHY, ISCHEMIC: Chronic | ICD-10-CM

## 2017-05-10 DIAGNOSIS — E78.2 MIXED HYPERLIPIDEMIA: ICD-10-CM

## 2017-05-10 DIAGNOSIS — I25.10 CORONARY ARTERY DISEASE INVOLVING NATIVE CORONARY ARTERY OF NATIVE HEART WITHOUT ANGINA PECTORIS: Primary | ICD-10-CM

## 2017-05-10 DIAGNOSIS — Z95.810 ICD (IMPLANTABLE CARDIOVERTER-DEFIBRILLATOR) IN PLACE: ICD-10-CM

## 2017-05-10 DIAGNOSIS — Z95.1 S/P CABG (CORONARY ARTERY BYPASS GRAFT): ICD-10-CM

## 2017-05-10 DIAGNOSIS — I10 ESSENTIAL HYPERTENSION: ICD-10-CM

## 2017-05-10 PROCEDURE — 1123F ACP DISCUSS/DSCN MKR DOCD: CPT | Performed by: NURSE PRACTITIONER

## 2017-05-10 PROCEDURE — 1036F TOBACCO NON-USER: CPT | Performed by: NURSE PRACTITIONER

## 2017-05-10 PROCEDURE — G8427 DOCREV CUR MEDS BY ELIG CLIN: HCPCS | Performed by: NURSE PRACTITIONER

## 2017-05-10 PROCEDURE — 3017F COLORECTAL CA SCREEN DOC REV: CPT | Performed by: NURSE PRACTITIONER

## 2017-05-10 PROCEDURE — 99213 OFFICE O/P EST LOW 20 MIN: CPT | Performed by: NURSE PRACTITIONER

## 2017-05-10 PROCEDURE — 93282 PRGRMG EVAL IMPLANTABLE DFB: CPT | Performed by: NURSE PRACTITIONER

## 2017-05-10 PROCEDURE — 4040F PNEUMOC VAC/ADMIN/RCVD: CPT | Performed by: NURSE PRACTITIONER

## 2017-05-10 PROCEDURE — G8417 CALC BMI ABV UP PARAM F/U: HCPCS | Performed by: NURSE PRACTITIONER

## 2017-05-10 PROCEDURE — G8598 ASA/ANTIPLAT THER USED: HCPCS | Performed by: NURSE PRACTITIONER

## 2017-05-18 ENCOUNTER — HOSPITAL ENCOUNTER (OUTPATIENT)
Dept: CT IMAGING | Age: 72
Discharge: HOME | End: 2017-05-18
Payer: MEDICARE

## 2017-05-18 DIAGNOSIS — K76.9 LESION OF LIVER: ICD-10-CM

## 2017-05-18 DIAGNOSIS — R14.0 ABDOMINAL BLOATING: ICD-10-CM

## 2017-05-18 LAB
GFR NON-AFRICAN AMERICAN: >60
PERFORMED ON: NORMAL
POC CREATININE: 1 MG/DL (ref 0.3–1.3)
POC SAMPLE TYPE: NORMAL

## 2017-05-18 PROCEDURE — 6360000004 HC RX CONTRAST MEDICATION: Performed by: NURSE PRACTITIONER

## 2017-05-18 PROCEDURE — 82565 ASSAY OF CREATININE: CPT

## 2017-05-18 PROCEDURE — 74177 CT ABD & PELVIS W/CONTRAST: CPT

## 2017-05-18 RX ADMIN — IOVERSOL 75 ML: 741 INJECTION INTRA-ARTERIAL; INTRAVENOUS at 11:11

## 2017-05-30 ENCOUNTER — OFFICE VISIT (OUTPATIENT)
Dept: GASTROENTEROLOGY | Age: 72
End: 2017-05-30
Payer: MEDICARE

## 2017-05-30 VITALS
SYSTOLIC BLOOD PRESSURE: 130 MMHG | DIASTOLIC BLOOD PRESSURE: 68 MMHG | WEIGHT: 228 LBS | RESPIRATION RATE: 20 BRPM | OXYGEN SATURATION: 98 % | HEIGHT: 66 IN | BODY MASS INDEX: 36.64 KG/M2 | HEART RATE: 64 BPM

## 2017-05-30 DIAGNOSIS — K76.9 LIVER LESION: Primary | ICD-10-CM

## 2017-05-30 PROCEDURE — 3017F COLORECTAL CA SCREEN DOC REV: CPT | Performed by: NURSE PRACTITIONER

## 2017-05-30 PROCEDURE — 1123F ACP DISCUSS/DSCN MKR DOCD: CPT | Performed by: NURSE PRACTITIONER

## 2017-05-30 PROCEDURE — 99213 OFFICE O/P EST LOW 20 MIN: CPT | Performed by: NURSE PRACTITIONER

## 2017-05-30 PROCEDURE — G8598 ASA/ANTIPLAT THER USED: HCPCS | Performed by: NURSE PRACTITIONER

## 2017-05-30 PROCEDURE — G8427 DOCREV CUR MEDS BY ELIG CLIN: HCPCS | Performed by: NURSE PRACTITIONER

## 2017-05-30 PROCEDURE — 1036F TOBACCO NON-USER: CPT | Performed by: NURSE PRACTITIONER

## 2017-05-30 PROCEDURE — 4040F PNEUMOC VAC/ADMIN/RCVD: CPT | Performed by: NURSE PRACTITIONER

## 2017-05-30 PROCEDURE — G8417 CALC BMI ABV UP PARAM F/U: HCPCS | Performed by: NURSE PRACTITIONER

## 2017-05-30 ASSESSMENT — ENCOUNTER SYMPTOMS
ABDOMINAL PAIN: 0
BLOOD IN STOOL: 0
ABDOMINAL DISTENTION: 0
TROUBLE SWALLOWING: 0
CHOKING: 0
VOMITING: 0
RECTAL PAIN: 0
VOICE CHANGE: 0
SHORTNESS OF BREATH: 0
DIARRHEA: 0
COUGH: 0
NAUSEA: 0
CONSTIPATION: 0

## 2017-06-14 ENCOUNTER — OFFICE VISIT (OUTPATIENT)
Dept: UROLOGY | Age: 72
End: 2017-06-14
Payer: MEDICARE

## 2017-06-14 VITALS
HEIGHT: 66 IN | WEIGHT: 223 LBS | SYSTOLIC BLOOD PRESSURE: 136 MMHG | TEMPERATURE: 97.9 F | BODY MASS INDEX: 35.84 KG/M2 | OXYGEN SATURATION: 94 % | DIASTOLIC BLOOD PRESSURE: 70 MMHG | HEART RATE: 77 BPM

## 2017-06-14 DIAGNOSIS — R97.20 ELEVATED PSA: Primary | ICD-10-CM

## 2017-06-14 LAB
BILIRUBIN, POC: NORMAL
BLOOD URINE, POC: NORMAL
CLARITY, POC: CLEAR
COLOR, POC: YELLOW
GLUCOSE URINE, POC: NORMAL
KETONES, POC: NORMAL
LEUKOCYTE EST, POC: NORMAL
NITRITE, POC: NORMAL
PH, POC: 5.5
PROTEIN, POC: NORMAL
SPECIFIC GRAVITY, POC: 1.01
UROBILINOGEN, POC: 0.2

## 2017-06-14 PROCEDURE — G8417 CALC BMI ABV UP PARAM F/U: HCPCS | Performed by: UROLOGY

## 2017-06-14 PROCEDURE — 1036F TOBACCO NON-USER: CPT | Performed by: UROLOGY

## 2017-06-14 PROCEDURE — G8427 DOCREV CUR MEDS BY ELIG CLIN: HCPCS | Performed by: UROLOGY

## 2017-06-14 PROCEDURE — 3017F COLORECTAL CA SCREEN DOC REV: CPT | Performed by: UROLOGY

## 2017-06-14 PROCEDURE — G8598 ASA/ANTIPLAT THER USED: HCPCS | Performed by: UROLOGY

## 2017-06-14 PROCEDURE — 1123F ACP DISCUSS/DSCN MKR DOCD: CPT | Performed by: UROLOGY

## 2017-06-14 PROCEDURE — 99203 OFFICE O/P NEW LOW 30 MIN: CPT | Performed by: UROLOGY

## 2017-06-14 PROCEDURE — 81002 URINALYSIS NONAUTO W/O SCOPE: CPT | Performed by: UROLOGY

## 2017-06-14 PROCEDURE — 4040F PNEUMOC VAC/ADMIN/RCVD: CPT | Performed by: UROLOGY

## 2017-06-14 RX ORDER — INSULIN GLARGINE 100 [IU]/ML
30 INJECTION, SOLUTION SUBCUTANEOUS NIGHTLY
COMMUNITY
End: 2019-06-03 | Stop reason: ALTCHOICE

## 2017-06-14 RX ORDER — POTASSIUM CHLORIDE 20 MEQ/1
10 TABLET, EXTENDED RELEASE ORAL DAILY
Refills: 2 | COMMUNITY
Start: 2017-03-31

## 2017-06-14 RX ORDER — FUROSEMIDE 40 MG/1
40 TABLET ORAL DAILY
Refills: 2 | COMMUNITY
Start: 2017-03-31 | End: 2021-10-28 | Stop reason: SDUPTHER

## 2017-06-14 ASSESSMENT — ENCOUNTER SYMPTOMS
COUGH: 0
DOUBLE VISION: 0
NAUSEA: 0
BACK PAIN: 0
HEMOPTYSIS: 0
VOMITING: 0
BLURRED VISION: 0
ABDOMINAL PAIN: 0

## 2017-08-09 DIAGNOSIS — I47.29 NSVT (NONSUSTAINED VENTRICULAR TACHYCARDIA): ICD-10-CM

## 2017-08-09 DIAGNOSIS — Z95.810 ICD (IMPLANTABLE CARDIOVERTER-DEFIBRILLATOR) IN PLACE: Primary | ICD-10-CM

## 2017-08-09 DIAGNOSIS — I25.5 CARDIOMYOPATHY, ISCHEMIC: Chronic | ICD-10-CM

## 2017-08-09 PROCEDURE — 93296 REM INTERROG EVL PM/IDS: CPT | Performed by: NURSE PRACTITIONER

## 2017-08-09 PROCEDURE — 93295 DEV INTERROG REMOTE 1/2/MLT: CPT | Performed by: NURSE PRACTITIONER

## 2017-08-29 ENCOUNTER — TELEPHONE (OUTPATIENT)
Dept: CARDIOLOGY | Age: 72
End: 2017-08-29

## 2017-09-18 ENCOUNTER — OFFICE VISIT (OUTPATIENT)
Dept: CARDIOLOGY | Age: 72
End: 2017-09-18
Payer: MEDICARE

## 2017-09-18 VITALS
SYSTOLIC BLOOD PRESSURE: 118 MMHG | DIASTOLIC BLOOD PRESSURE: 72 MMHG | WEIGHT: 227 LBS | HEART RATE: 77 BPM | HEIGHT: 66 IN | BODY MASS INDEX: 36.48 KG/M2

## 2017-09-18 DIAGNOSIS — Z95.810 ICD (IMPLANTABLE CARDIOVERTER-DEFIBRILLATOR) IN PLACE: ICD-10-CM

## 2017-09-18 DIAGNOSIS — I25.10 ARTERIOSCLEROTIC HEART DISEASE: Primary | ICD-10-CM

## 2017-09-18 PROCEDURE — 1036F TOBACCO NON-USER: CPT | Performed by: INTERNAL MEDICINE

## 2017-09-18 PROCEDURE — G8427 DOCREV CUR MEDS BY ELIG CLIN: HCPCS | Performed by: INTERNAL MEDICINE

## 2017-09-18 PROCEDURE — 93282 PRGRMG EVAL IMPLANTABLE DFB: CPT | Performed by: INTERNAL MEDICINE

## 2017-09-18 PROCEDURE — G8598 ASA/ANTIPLAT THER USED: HCPCS | Performed by: INTERNAL MEDICINE

## 2017-09-18 PROCEDURE — 99214 OFFICE O/P EST MOD 30 MIN: CPT | Performed by: INTERNAL MEDICINE

## 2017-09-18 PROCEDURE — G8417 CALC BMI ABV UP PARAM F/U: HCPCS | Performed by: INTERNAL MEDICINE

## 2017-09-18 PROCEDURE — 4040F PNEUMOC VAC/ADMIN/RCVD: CPT | Performed by: INTERNAL MEDICINE

## 2017-09-18 PROCEDURE — 3017F COLORECTAL CA SCREEN DOC REV: CPT | Performed by: INTERNAL MEDICINE

## 2017-09-18 PROCEDURE — 1123F ACP DISCUSS/DSCN MKR DOCD: CPT | Performed by: INTERNAL MEDICINE

## 2017-09-18 RX ORDER — METOPROLOL TARTRATE 100 MG/1
TABLET ORAL
COMMUNITY
End: 2018-11-29 | Stop reason: ALTCHOICE

## 2017-11-09 DIAGNOSIS — Z95.810 ICD (IMPLANTABLE CARDIOVERTER-DEFIBRILLATOR) IN PLACE: Primary | ICD-10-CM

## 2017-11-09 DIAGNOSIS — I25.5 CARDIOMYOPATHY, ISCHEMIC: Chronic | ICD-10-CM

## 2017-11-09 PROCEDURE — 93296 REM INTERROG EVL PM/IDS: CPT | Performed by: CLINICAL NURSE SPECIALIST

## 2017-11-09 PROCEDURE — 93295 DEV INTERROG REMOTE 1/2/MLT: CPT | Performed by: CLINICAL NURSE SPECIALIST

## 2018-02-01 ENCOUNTER — TELEPHONE (OUTPATIENT)
Dept: GASTROENTEROLOGY | Age: 73
End: 2018-02-01

## 2018-02-01 DIAGNOSIS — K76.9 LIVER LESION: Primary | ICD-10-CM

## 2018-02-01 NOTE — TELEPHONE ENCOUNTER
----- Message from [facility] sent at 5/30/2017  2:54 PM CDT -----  Leanna Matthews check out note from 5/30/2017 . Return in about 9 months (around 2/28/2018).   CT CHATO VARELA JR. [facility] PH Liver Prot needs scheduled in Feb 2018

## 2018-02-09 DIAGNOSIS — I25.5 CARDIOMYOPATHY, ISCHEMIC: Chronic | ICD-10-CM

## 2018-02-09 DIAGNOSIS — Z95.810 ICD (IMPLANTABLE CARDIOVERTER-DEFIBRILLATOR) IN PLACE: Primary | ICD-10-CM

## 2018-02-09 PROCEDURE — 93296 REM INTERROG EVL PM/IDS: CPT | Performed by: CLINICAL NURSE SPECIALIST

## 2018-02-09 PROCEDURE — 93295 DEV INTERROG REMOTE 1/2/MLT: CPT | Performed by: CLINICAL NURSE SPECIALIST

## 2018-04-02 ENCOUNTER — OFFICE VISIT (OUTPATIENT)
Dept: CARDIOLOGY | Age: 73
End: 2018-04-02
Payer: MEDICARE

## 2018-04-02 VITALS
HEART RATE: 68 BPM | DIASTOLIC BLOOD PRESSURE: 68 MMHG | SYSTOLIC BLOOD PRESSURE: 130 MMHG | BODY MASS INDEX: 35.68 KG/M2 | HEIGHT: 66 IN | WEIGHT: 222 LBS

## 2018-04-02 DIAGNOSIS — I25.5 CARDIOMYOPATHY, ISCHEMIC: Chronic | ICD-10-CM

## 2018-04-02 DIAGNOSIS — I25.10 ATHEROSCLEROSIS OF NATIVE CORONARY ARTERY OF NATIVE HEART WITHOUT ANGINA PECTORIS: Primary | ICD-10-CM

## 2018-04-02 DIAGNOSIS — Z95.810 SINGLE IMPLANTABLE CARDIOVERTER-DEFIBRILLATOR (ICD) IN SITU: ICD-10-CM

## 2018-04-02 DIAGNOSIS — E78.2 MIXED HYPERLIPIDEMIA: ICD-10-CM

## 2018-04-02 DIAGNOSIS — I10 ESSENTIAL HYPERTENSION: ICD-10-CM

## 2018-04-02 PROCEDURE — 1123F ACP DISCUSS/DSCN MKR DOCD: CPT | Performed by: NURSE PRACTITIONER

## 2018-04-02 PROCEDURE — G8417 CALC BMI ABV UP PARAM F/U: HCPCS | Performed by: NURSE PRACTITIONER

## 2018-04-02 PROCEDURE — 99213 OFFICE O/P EST LOW 20 MIN: CPT | Performed by: NURSE PRACTITIONER

## 2018-04-02 PROCEDURE — 1036F TOBACCO NON-USER: CPT | Performed by: NURSE PRACTITIONER

## 2018-04-02 PROCEDURE — G8427 DOCREV CUR MEDS BY ELIG CLIN: HCPCS | Performed by: NURSE PRACTITIONER

## 2018-04-02 PROCEDURE — 93282 PRGRMG EVAL IMPLANTABLE DFB: CPT | Performed by: NURSE PRACTITIONER

## 2018-04-02 PROCEDURE — 4040F PNEUMOC VAC/ADMIN/RCVD: CPT | Performed by: NURSE PRACTITIONER

## 2018-04-02 PROCEDURE — 3017F COLORECTAL CA SCREEN DOC REV: CPT | Performed by: NURSE PRACTITIONER

## 2018-04-02 PROCEDURE — G8598 ASA/ANTIPLAT THER USED: HCPCS | Performed by: NURSE PRACTITIONER

## 2018-04-02 RX ORDER — LISINOPRIL 20 MG/1
10 TABLET ORAL DAILY
COMMUNITY
End: 2021-08-12 | Stop reason: DRUGHIGH

## 2018-05-02 DIAGNOSIS — I25.5 CARDIOMYOPATHY, ISCHEMIC: Chronic | ICD-10-CM

## 2018-05-02 DIAGNOSIS — Z95.810 SINGLE IMPLANTABLE CARDIOVERTER-DEFIBRILLATOR (ICD) IN SITU: Primary | ICD-10-CM

## 2018-05-02 PROCEDURE — 99999 PR OFFICE/OUTPT VISIT,PROCEDURE ONLY: CPT | Performed by: NURSE PRACTITIONER

## 2018-06-19 DIAGNOSIS — I25.5 CARDIOMYOPATHY, ISCHEMIC: Chronic | ICD-10-CM

## 2018-06-19 DIAGNOSIS — Z95.810 SINGLE IMPLANTABLE CARDIOVERTER-DEFIBRILLATOR (ICD) IN SITU: Primary | ICD-10-CM

## 2018-06-19 PROCEDURE — 93295 DEV INTERROG REMOTE 1/2/MLT: CPT | Performed by: NURSE PRACTITIONER

## 2018-06-19 PROCEDURE — 93296 REM INTERROG EVL PM/IDS: CPT | Performed by: NURSE PRACTITIONER

## 2018-10-01 ENCOUNTER — TELEPHONE (OUTPATIENT)
Dept: CARDIOLOGY | Age: 73
End: 2018-10-01

## 2018-10-01 NOTE — TELEPHONE ENCOUNTER
Unable to leave msg on pt phone in regards to provider out of office for further notice due to personal reasons, will try to call pt following day to reschedule appt

## 2018-10-08 DIAGNOSIS — Z95.810 SINGLE IMPLANTABLE CARDIOVERTER-DEFIBRILLATOR (ICD) IN SITU: ICD-10-CM

## 2018-10-08 DIAGNOSIS — I25.5 CARDIOMYOPATHY, ISCHEMIC: Primary | Chronic | ICD-10-CM

## 2018-10-08 PROCEDURE — 93295 DEV INTERROG REMOTE 1/2/MLT: CPT | Performed by: CLINICAL NURSE SPECIALIST

## 2018-10-08 PROCEDURE — 93296 REM INTERROG EVL PM/IDS: CPT | Performed by: CLINICAL NURSE SPECIALIST

## 2018-11-29 ENCOUNTER — OFFICE VISIT (OUTPATIENT)
Dept: CARDIOLOGY | Age: 73
End: 2018-11-29
Payer: MEDICARE

## 2018-11-29 VITALS
DIASTOLIC BLOOD PRESSURE: 82 MMHG | WEIGHT: 210 LBS | BODY MASS INDEX: 33.75 KG/M2 | SYSTOLIC BLOOD PRESSURE: 130 MMHG | HEIGHT: 66 IN | HEART RATE: 68 BPM

## 2018-11-29 DIAGNOSIS — E78.2 MIXED HYPERLIPIDEMIA: ICD-10-CM

## 2018-11-29 DIAGNOSIS — Z95.810 SINGLE IMPLANTABLE CARDIOVERTER-DEFIBRILLATOR (ICD) IN SITU: ICD-10-CM

## 2018-11-29 DIAGNOSIS — I25.10 ATHEROSCLEROSIS OF NATIVE CORONARY ARTERY OF NATIVE HEART WITHOUT ANGINA PECTORIS: Primary | ICD-10-CM

## 2018-11-29 DIAGNOSIS — E11.8 TYPE 2 DIABETES MELLITUS WITH COMPLICATION, WITHOUT LONG-TERM CURRENT USE OF INSULIN (HCC): ICD-10-CM

## 2018-11-29 DIAGNOSIS — I10 ESSENTIAL HYPERTENSION: ICD-10-CM

## 2018-11-29 DIAGNOSIS — I25.5 CARDIOMYOPATHY, ISCHEMIC: Chronic | ICD-10-CM

## 2018-11-29 PROCEDURE — 1101F PT FALLS ASSESS-DOCD LE1/YR: CPT | Performed by: CLINICAL NURSE SPECIALIST

## 2018-11-29 PROCEDURE — 4040F PNEUMOC VAC/ADMIN/RCVD: CPT | Performed by: CLINICAL NURSE SPECIALIST

## 2018-11-29 PROCEDURE — 3017F COLORECTAL CA SCREEN DOC REV: CPT | Performed by: CLINICAL NURSE SPECIALIST

## 2018-11-29 PROCEDURE — 1123F ACP DISCUSS/DSCN MKR DOCD: CPT | Performed by: CLINICAL NURSE SPECIALIST

## 2018-11-29 PROCEDURE — G8417 CALC BMI ABV UP PARAM F/U: HCPCS | Performed by: CLINICAL NURSE SPECIALIST

## 2018-11-29 PROCEDURE — 2022F DILAT RTA XM EVC RTNOPTHY: CPT | Performed by: CLINICAL NURSE SPECIALIST

## 2018-11-29 PROCEDURE — G8427 DOCREV CUR MEDS BY ELIG CLIN: HCPCS | Performed by: CLINICAL NURSE SPECIALIST

## 2018-11-29 PROCEDURE — 99213 OFFICE O/P EST LOW 20 MIN: CPT | Performed by: CLINICAL NURSE SPECIALIST

## 2018-11-29 PROCEDURE — 3046F HEMOGLOBIN A1C LEVEL >9.0%: CPT | Performed by: CLINICAL NURSE SPECIALIST

## 2018-11-29 PROCEDURE — G8598 ASA/ANTIPLAT THER USED: HCPCS | Performed by: CLINICAL NURSE SPECIALIST

## 2018-11-29 PROCEDURE — 93282 PRGRMG EVAL IMPLANTABLE DFB: CPT | Performed by: CLINICAL NURSE SPECIALIST

## 2018-11-29 PROCEDURE — G8484 FLU IMMUNIZE NO ADMIN: HCPCS | Performed by: CLINICAL NURSE SPECIALIST

## 2018-11-29 PROCEDURE — 1036F TOBACCO NON-USER: CPT | Performed by: CLINICAL NURSE SPECIALIST

## 2018-11-29 RX ORDER — NITROGLYCERIN 0.4 MG/1
0.4 TABLET SUBLINGUAL EVERY 5 MIN PRN
Qty: 25 TABLET | Refills: 3 | Status: SHIPPED | OUTPATIENT
Start: 2018-11-29 | End: 2021-04-01 | Stop reason: SDUPTHER

## 2018-11-29 RX ORDER — MONTELUKAST SODIUM 10 MG/1
10 TABLET ORAL NIGHTLY PRN
Status: ON HOLD | COMMUNITY
End: 2020-02-18

## 2018-11-29 ASSESSMENT — ENCOUNTER SYMPTOMS
FACIAL SWELLING: 0
COUGH: 0
WHEEZING: 0
SHORTNESS OF BREATH: 1
ABDOMINAL PAIN: 0
NAUSEA: 0
VOMITING: 0
EYE REDNESS: 0
CHEST TIGHTNESS: 0

## 2018-11-29 NOTE — PATIENT INSTRUCTIONS
(if your doctor says it is okay). Even if you can only do a small amount, exercise will help you get stronger, have more energy, and manage your weight and your stress. Walking is an easy way to get exercise. Start out by walking a little more than you did before. Bit by bit, increase the amount you walk.     · When you exercise, watch for signs that your heart is working too hard. You are pushing yourself too hard if you cannot talk while you are exercising. If you become short of breath or dizzy or have chest pain, stop, sit down, and rest.     · If you feel \"wiped out\" the day after you exercise, walk slower or for a shorter distance until you can work up to a better pace.     · Get enough rest at night. Sleeping with 1 or 2 pillows under your upper body and head may help you breathe easier.    Lifestyle changes    · Do not smoke. Smoking can make a heart condition worse. If you need help quitting, talk to your doctor about stop-smoking programs and medicines. These can increase your chances of quitting for good. Quitting smoking may be the most important step you can take to protect your heart.     · Limit alcohol to 2 drinks a day for men and 1 drink a day for women. Too much alcohol can cause health problems.     · Avoid getting sick from colds and the flu. Get a pneumococcal vaccine shot. If you have had one before, ask your doctor whether you need another dose. Get a flu shot each year. If you must be around people with colds or the flu, wash your hands often. When should you call for help?   Call 911 if you have symptoms of sudden heart failure such as:    · You have severe trouble breathing.     · You cough up pink, foamy mucus.     · You have a new irregular or rapid heartbeat.    Call your doctor now or seek immediate medical care if:    · You have new or increased shortness of breath.     · You are dizzy or lightheaded, or you feel like you may faint.     · You have sudden weight gain, such as more

## 2018-11-29 NOTE — PROGRESS NOTES
right ventricular systolic pressure is 42 mmHg    Assessment:     Diagnosis Orders   1. Atherosclerosis of native coronary artery of native heart without angina pectoris     2. Essential hypertension     3. Cardiomyopathy, ischemic     4. Mixed hyperlipidemia     5. Type 2 diabetes mellitus with complication, without long-term current use of insulin ([address]     6. Single implantable cardioverter-defibrillator (ICD) in situ       Atherosclerosis of coronary artery-stable without angina    Hypertension-well controlled on current regimen    Ischemic cardiomyopathy NYHA class II, stage C, last EF 30% in 2017-appears euvolemic on guideline directed medical therapy    Diabetes- in good control per patient report. Last A1c in the 6 range'    ICD interrogation showed adequate battery voltage and charge time. Mode: VVI  Lead impedances stable. Normal diagnostics and safety margins noted. No ICD discharges. Sustained arrythmia:  None  Optivol stable. Please see the scanned interrogation report    Stable cardiovascular status. No evidence of overt heart failure,angina or dysrhythmia. Plan    Return in about 6 months (around 5/29/2019). Keep diabetes under control to help prevent further heart disease. Keep Hemoglobin A1C less than 7%. - Weigh daily and report weight gain of 3lbs or more in 24hrs or 5lbs in one week. - Call for increasing shortness of breath or increasing swelling in feet and legs. (This could mean you are retaining too much fluid)  - 2000mg low sodium diet  - Fluid restriction of 1500ml per day (about 6 cups of fluid per day)    Call with any questionsor concerns  Follow up with Macho Kerns MD for non cardiac problems  Report any new problems  Cardiovascular Fitness-Exercise as tolerated. Strive for 15 minutes of exercise most days of the week.     Cardiac / HealthyDiet  Continue current medications as directed  Continue plan of treatment  It is always recommended that you bring your

## 2019-03-04 DIAGNOSIS — Z95.810 ICD (IMPLANTABLE CARDIOVERTER-DEFIBRILLATOR) IN PLACE: Primary | ICD-10-CM

## 2019-03-04 DIAGNOSIS — I25.5 CARDIOMYOPATHY, ISCHEMIC: ICD-10-CM

## 2019-03-04 PROCEDURE — 93296 REM INTERROG EVL PM/IDS: CPT | Performed by: CLINICAL NURSE SPECIALIST

## 2019-03-04 PROCEDURE — 93295 DEV INTERROG REMOTE 1/2/MLT: CPT | Performed by: CLINICAL NURSE SPECIALIST

## 2019-05-31 ENCOUNTER — TELEPHONE (OUTPATIENT)
Dept: CARDIOLOGY | Age: 74
End: 2019-05-31

## 2019-06-03 ENCOUNTER — OFFICE VISIT (OUTPATIENT)
Dept: CARDIOLOGY | Age: 74
End: 2019-06-03
Payer: MEDICARE

## 2019-06-03 VITALS
HEIGHT: 67 IN | SYSTOLIC BLOOD PRESSURE: 126 MMHG | BODY MASS INDEX: 32.18 KG/M2 | HEART RATE: 67 BPM | DIASTOLIC BLOOD PRESSURE: 80 MMHG | WEIGHT: 205 LBS

## 2019-06-03 DIAGNOSIS — E78.2 MIXED HYPERLIPIDEMIA: ICD-10-CM

## 2019-06-03 DIAGNOSIS — I50.22 CHRONIC SYSTOLIC CONGESTIVE HEART FAILURE (HCC): ICD-10-CM

## 2019-06-03 DIAGNOSIS — Z95.810 SINGLE IMPLANTABLE CARDIOVERTER-DEFIBRILLATOR (ICD) IN SITU: ICD-10-CM

## 2019-06-03 DIAGNOSIS — I25.10 ATHEROSCLEROSIS OF NATIVE CORONARY ARTERY OF NATIVE HEART WITHOUT ANGINA PECTORIS: Primary | ICD-10-CM

## 2019-06-03 DIAGNOSIS — I10 ESSENTIAL HYPERTENSION: ICD-10-CM

## 2019-06-03 DIAGNOSIS — E11.8 TYPE 2 DIABETES MELLITUS WITH COMPLICATION, WITHOUT LONG-TERM CURRENT USE OF INSULIN (HCC): ICD-10-CM

## 2019-06-03 PROCEDURE — 4040F PNEUMOC VAC/ADMIN/RCVD: CPT | Performed by: CLINICAL NURSE SPECIALIST

## 2019-06-03 PROCEDURE — 1123F ACP DISCUSS/DSCN MKR DOCD: CPT | Performed by: CLINICAL NURSE SPECIALIST

## 2019-06-03 PROCEDURE — 1036F TOBACCO NON-USER: CPT | Performed by: CLINICAL NURSE SPECIALIST

## 2019-06-03 PROCEDURE — 99213 OFFICE O/P EST LOW 20 MIN: CPT | Performed by: CLINICAL NURSE SPECIALIST

## 2019-06-03 PROCEDURE — G8417 CALC BMI ABV UP PARAM F/U: HCPCS | Performed by: CLINICAL NURSE SPECIALIST

## 2019-06-03 PROCEDURE — 2022F DILAT RTA XM EVC RTNOPTHY: CPT | Performed by: CLINICAL NURSE SPECIALIST

## 2019-06-03 PROCEDURE — 3017F COLORECTAL CA SCREEN DOC REV: CPT | Performed by: CLINICAL NURSE SPECIALIST

## 2019-06-03 PROCEDURE — 93282 PRGRMG EVAL IMPLANTABLE DFB: CPT | Performed by: CLINICAL NURSE SPECIALIST

## 2019-06-03 PROCEDURE — G8427 DOCREV CUR MEDS BY ELIG CLIN: HCPCS | Performed by: CLINICAL NURSE SPECIALIST

## 2019-06-03 PROCEDURE — 3046F HEMOGLOBIN A1C LEVEL >9.0%: CPT | Performed by: CLINICAL NURSE SPECIALIST

## 2019-06-03 PROCEDURE — G8598 ASA/ANTIPLAT THER USED: HCPCS | Performed by: CLINICAL NURSE SPECIALIST

## 2019-06-03 RX ORDER — ESCITALOPRAM OXALATE 10 MG/1
10 TABLET ORAL DAILY
COMMUNITY

## 2019-06-03 ASSESSMENT — ENCOUNTER SYMPTOMS
COUGH: 0
VOMITING: 0
WHEEZING: 0
NAUSEA: 0
ABDOMINAL PAIN: 0
EYE REDNESS: 0
SHORTNESS OF BREATH: 1
CHEST TIGHTNESS: 0
FACIAL SWELLING: 0

## 2019-06-03 NOTE — PATIENT INSTRUCTIONS
Return in about 3 month (around 7/3/2019) for APRN. Battery nearing recommended replacement time-recheck in 1 month  Keep diabetes under control to help prevent further heart disease. Keep Hemoglobin A1C less than 7%. - Weigh daily and report weight gain of 3lbs or more in 24hrs or 5lbs in one week. - Call for increasing shortness of breath or increasing swelling in feet and legs.     (This could mean you are retaining too much fluid)  - 2000mg low sodium diet  - Fluid restriction of 1500ml per day (about 6 cups of fluid per day)

## 2019-06-03 NOTE — PROGRESS NOTES
[facility], [address] [place] [address]  Phone: [phone]  Fax: [phone]    OFFICE VISIT:  6/3/2019    Poli Him - : 1945    Reason For Visit:  Zack Magana is a 68 y.o. male who is here for 6 Month Follow-Up (No cardiac symptoms today.) and Coronary Artery Disease    HPI  Patient is here for follow-up today with a history of CAD and ischemic cardiomyopathy with an implanted defibrillator. He states he is doing well overall. He denies any chest pain, unusual dyspnea, orthopnea, PND, edema, sudden weight gain or palpitations. Jenise Babinski, MD is PCP.   Poli Barth has the following history as recorded in Health system:    Patient Active Problem List    Diagnosis Date Noted    Chronic systolic congestive heart failure ([address] 2019    S/P CABG (coronary artery bypass graft) 05/10/2017    Abnormal nuclear stress test     Angina pectoris syndrome ([address]     Abdominal pain 02/10/2017    Chronic constipation 02/10/2017    Liver mass 02/10/2017    Acalculous cholecystitis 2016    Hyperlipidemia     Acute myocardial infarction of anterolateral wall, subsequent episode of care [facility]) 2011    Single implantable cardioverter-defibrillator (ICD) in situ 2011    Cardiomyopathy, ischemic 2011    Coronary atherosclerosis of native coronary artery 2011    Hypertension 2011    Type 2 diabetes mellitus with complication, without long-term current use of insulin ([address] 2011     Past Medical History:   Diagnosis Date    Acute myocardial infarction of anterolateral wall, [facility] 2011    AICD discharge     Arthritis     CAD (coronary artery disease) of artery bypass graft     Cardiomyopathy, ischemic 2011    Coronary atherosclerosis of native coronary artery     Hyperlipidemia     Hyperlipidemia     Cholesterol management per [facility] Benson M.D.    Hypertension  ICD (implantable cardiac defibrillator) 5/25/2011    Rapid atrial fibrillation (HCC)     Shortness of breath     Type II or unspecified type diabetes mellitus without mention of complication, not stated as uncontrolled      Past Surgical History:   Procedure Laterality Date    APPENDECTOMY      CARDIAC CATHETERIZATION  4/29/14  [facility]    2 vessel CAD.  EF 45%    CARDIAC PACEMAKER PLACEMENT      medtronic     CHOLECYSTECTOMY, LAPAROSCOPIC N/A 9/26/2016    CHOLECYSTECTOMY LAPAROSCOPIC performed by Ira Rosenbaum MD at [address] GRAFT  01/03/2011    2V CABG (LIMA - LAD, SVG - PLM)    DIAGNOSTIC CARDIAC CATH LAB PROCEDURE  12/25/10    left heart cath, selective coronary arteriography, left ventriculography, direct infarct stenting to the left anterior descending coronary artery, left iliac arteriography, right iliac arteriography via left common femoral artery     FINGER AMPUTATION      traumatic left index finger    HERNIA REPAIR      ventral    MIDDLE EAR SURGERY Right 1966    tumor removed, some hearing loss     OK COLONOSCOPY W/BIOPSY SINGLE/MULTIPLE N/A 4/3/2017    Dr JOSE Herrera-diverticular disease, benign ulcerated inflammatory polyp demonstrating a granulation tissue response--1 yr recall    OK EGD TRANSORAL BIOPSY SINGLE/MULTIPLE N/A 4/3/2017    Dr Thomas Lights hiatal hernia, gastritis, chronic inflammation     Family History   Adopted: Yes   Problem Relation Age of Onset    Diabetes Other     Heart Disease Other      Social History     Tobacco Use    Smoking status: Never Smoker    Smokeless tobacco: Former User     Types: Chew   Substance Use Topics    Alcohol use: No     Alcohol/week: 0.0 oz      Current Outpatient Medications   Medication Sig Dispense Refill    escitalopram (LEXAPRO) 10 MG tablet Take 10 mg by mouth daily      montelukast (SINGULAIR) 10 MG tablet Take 10 mg by mouth nightly      nitroGLYCERIN (NITROSTAT) 0.4 MG SL tablet Place 1 tablet under the tongue every 5 minutes as needed for Chest pain 25 tablet 3    lisinopril (PRINIVIL;ZESTRIL) 20 MG tablet Take 1/2 tablet daily      metFORMIN (GLUCOPHAGE) 500 MG tablet Take 1/2 tablet twice daily      PROAIR  (90 Base) MCG/ACT inhaler Inhale 2 puffs into the lungs as needed   1    furosemide (LASIX) 40 MG tablet Take 40 mg by mouth daily   2    potassium chloride (KLOR-CON M) 20 MEQ extended release tablet Take 20 mEq by mouth daily   2    polyethylene glycol (GLYCOLAX) powder Take 17 g by mouth every other day       simvastatin (ZOCOR) 80 MG tablet Take 1/2 tablet daily      pantoprazole (PROTONIX) 40 MG tablet Take 40 mg by mouth daily.  sotalol (BETAPACE) 80 MG tablet Take 1 tablet by mouth 2 times daily. 60 tablet 5    mirtazapine (REMERON SOL-TAB) 30 MG disintegrating tablet Take 30 mg by mouth nightly       aspirin EC 81 MG EC tablet Take 81 mg by mouth daily. No current facility-administered medications for this visit. Allergies: Lortab [hydrocodone-acetaminophen] and Phenergan  [promethazine hcl]    Review of Systems  Review of Systems   Constitutional: Negative for activity change, diaphoresis, fatigue, fever and unexpected weight change. HENT: Negative for facial swelling and nosebleeds. Eyes: Negative for redness and visual disturbance. Respiratory: Positive for shortness of breath (mild). Negative for cough, chest tightness and wheezing. Cardiovascular: Negative for chest pain, palpitations and leg swelling. Gastrointestinal: Negative for abdominal pain, nausea and vomiting. Endocrine: Negative for cold intolerance and heat intolerance. Genitourinary: Negative for dysuria and hematuria. Musculoskeletal: Negative for arthralgias and myalgias. Skin: Negative for pallor and rash. Neurological: Negative for dizziness, seizures, syncope, weakness and light-headedness. Hematological: Does not bruise/bleed easily.    Psychiatric/Behavioral: Negative for agitation. The patient is not nervous/anxious. Objective  Vital Signs - /80   Pulse 67   Ht 5' 6.5\" (1.689 m)   Wt 205 lb (93 kg)   BMI 32.59 kg/m²    Wt Readings from Last 3 Encounters:   06/03/19 205 lb (93 kg)   11/29/18 210 lb (95.3 kg)   04/02/18 222 lb (100.7 kg)      Physical Exam   Constitutional: He is oriented to person, place, and time. He appears well-developed and well-nourished. HENT:   Head: Normocephalic and atraumatic. Eyes: Pupils are equal, round, and reactive to light. Right eye exhibits no discharge. Left eye exhibits no discharge. Neck: No JVD present. No tracheal deviation present. Cardiovascular: Normal rate, regular rhythm, normal heart sounds and intact distal pulses. Exam reveals no gallop and no friction rub. No murmur heard. No carotid bruit   Pulmonary/Chest: Effort normal and breath sounds normal. No respiratory distress. He has no wheezes. He has no rales. Abdominal: Soft. There is no tenderness. Musculoskeletal: He exhibits no edema. Normal gait and station   Neurological: He is alert and oriented to person, place, and time. No cranial nerve deficit. Skin: Skin is warm and dry. No rash noted. Psychiatric: He has a normal mood and affect. His behavior is normal. Judgment normal.   Nursing note and vitals reviewed. Data:  Echo 3/17  IMPRESSION:  1. A technically difficult study. 2.  Left ventricular dysfunction with apical hypokinesis and an ejection  fraction of approximately 30%. 3.  Left ventricular diastolic dysfunction. 4.  Right ventricular and right atrial enlargement. 5.  Cannot exclude an apical mural thrombus. 6.  Not mentioned above, pacemaker lead noted in the right ventricle. 7.  Mild mitral and tricuspid regurgitation. 8.  The right ventricular systolic pressure is 42 mmHg    Assessment:     Diagnosis Orders   1. Atherosclerosis of native coronary artery of native heart without angina pectoris     2.  Chronic systolic congestive heart failure ([address]     3. Essential hypertension     4. Type 2 diabetes mellitus with complication, without long-term current use of insulin ([address]     5. Mixed hyperlipidemia     6. Single implantable cardioverter-defibrillator (ICD) in situ       Atherosclerosis of coronary artery-stable without angina    Hypertension-well controlled on current regimen    Systolic CHF/ Ischemic cardiomyopathy NYHA class II, stage C, last EF 30% in 2017-appears euvolemic on sotalol and lisinopril, Lasix    Diabetes- in good control per patient report. Last A1c in the 6 range    Hyperlipidemia-on statin therapy, managed by PCP and in good control per patient report    ICD interrogation showed battery nearing recommended replacement time 2.63 V   Mode: VVI  Lead impedances stable. Normal diagnostics and safety margins noted. No ICD discharges. Sustained arrythmia:  None  Optivol trending up without signs of heart failure. Please see the scanned interrogation report  Battery check again in 1 month    Stable cardiovascular status. No evidence of overt heart failure,angina or dysrhythmia. Plan    Return in about 3 months (around 9/3/2019) for APRN. I  Keep diabetes under control to help prevent further heart disease. Keep Hemoglobin A1C less than 7%. - Weigh daily and report weight gain of 3lbs or more in 24hrs or 5lbs in one week. - Call for increasing shortness of breath or increasing swelling in feet and legs. (This could mean you are retaining too much fluid)  - 2000mg low sodium diet  - Fluid restriction of 1500ml per day (about 6 cups of fluid per day)    Call with any questionsor concerns  Follow up with Thomas Murrell MD for non cardiac problems  Report any new problems  Cardiovascular Fitness-Exercise as tolerated. Strive for 15 minutes of exercise most days of the week.     Cardiac / HealthyDiet  Continue current medications as directed  Continue plan of treatment  It is always recommended that you bring your medicationsbottles with you to each visit - this is for your safety!        Jarvis Perales, AMPARO

## 2019-07-03 DIAGNOSIS — Z95.810 ICD (IMPLANTABLE CARDIOVERTER-DEFIBRILLATOR) IN PLACE: Primary | ICD-10-CM

## 2019-07-03 DIAGNOSIS — I50.22 CHRONIC SYSTOLIC CONGESTIVE HEART FAILURE (HCC): ICD-10-CM

## 2019-08-05 DIAGNOSIS — I25.5 CARDIOMYOPATHY, ISCHEMIC: ICD-10-CM

## 2019-08-05 DIAGNOSIS — Z95.810 ICD (IMPLANTABLE CARDIOVERTER-DEFIBRILLATOR) IN PLACE: Primary | ICD-10-CM

## 2019-08-05 DIAGNOSIS — I50.22 CHRONIC SYSTOLIC CONGESTIVE HEART FAILURE (HCC): ICD-10-CM

## 2019-09-03 ENCOUNTER — OFFICE VISIT (OUTPATIENT)
Dept: CARDIOLOGY | Age: 74
End: 2019-09-03
Payer: MEDICARE

## 2019-09-03 VITALS
SYSTOLIC BLOOD PRESSURE: 104 MMHG | HEIGHT: 67 IN | DIASTOLIC BLOOD PRESSURE: 60 MMHG | HEART RATE: 87 BPM | BODY MASS INDEX: 29.19 KG/M2 | WEIGHT: 186 LBS

## 2019-09-03 DIAGNOSIS — I50.22 CHRONIC SYSTOLIC CONGESTIVE HEART FAILURE (HCC): ICD-10-CM

## 2019-09-03 DIAGNOSIS — Z95.810 SINGLE IMPLANTABLE CARDIOVERTER-DEFIBRILLATOR (ICD) IN SITU: ICD-10-CM

## 2019-09-03 DIAGNOSIS — E11.8 TYPE 2 DIABETES MELLITUS WITH COMPLICATION, WITHOUT LONG-TERM CURRENT USE OF INSULIN (HCC): ICD-10-CM

## 2019-09-03 DIAGNOSIS — I25.10 ATHEROSCLEROSIS OF NATIVE CORONARY ARTERY OF NATIVE HEART WITHOUT ANGINA PECTORIS: Primary | ICD-10-CM

## 2019-09-03 DIAGNOSIS — E78.2 MIXED HYPERLIPIDEMIA: ICD-10-CM

## 2019-09-03 DIAGNOSIS — I10 ESSENTIAL HYPERTENSION: ICD-10-CM

## 2019-09-03 PROCEDURE — 2022F DILAT RTA XM EVC RTNOPTHY: CPT | Performed by: CLINICAL NURSE SPECIALIST

## 2019-09-03 PROCEDURE — 93282 PRGRMG EVAL IMPLANTABLE DFB: CPT | Performed by: CLINICAL NURSE SPECIALIST

## 2019-09-03 PROCEDURE — 99213 OFFICE O/P EST LOW 20 MIN: CPT | Performed by: CLINICAL NURSE SPECIALIST

## 2019-09-03 PROCEDURE — G8598 ASA/ANTIPLAT THER USED: HCPCS | Performed by: CLINICAL NURSE SPECIALIST

## 2019-09-03 PROCEDURE — 1123F ACP DISCUSS/DSCN MKR DOCD: CPT | Performed by: CLINICAL NURSE SPECIALIST

## 2019-09-03 PROCEDURE — 1036F TOBACCO NON-USER: CPT | Performed by: CLINICAL NURSE SPECIALIST

## 2019-09-03 PROCEDURE — G8417 CALC BMI ABV UP PARAM F/U: HCPCS | Performed by: CLINICAL NURSE SPECIALIST

## 2019-09-03 PROCEDURE — 3017F COLORECTAL CA SCREEN DOC REV: CPT | Performed by: CLINICAL NURSE SPECIALIST

## 2019-09-03 PROCEDURE — G8427 DOCREV CUR MEDS BY ELIG CLIN: HCPCS | Performed by: CLINICAL NURSE SPECIALIST

## 2019-09-03 PROCEDURE — 3046F HEMOGLOBIN A1C LEVEL >9.0%: CPT | Performed by: CLINICAL NURSE SPECIALIST

## 2019-09-03 PROCEDURE — 4040F PNEUMOC VAC/ADMIN/RCVD: CPT | Performed by: CLINICAL NURSE SPECIALIST

## 2019-09-03 RX ORDER — BUMETANIDE 2 MG/1
2 TABLET ORAL
COMMUNITY

## 2019-09-03 ASSESSMENT — ENCOUNTER SYMPTOMS
SHORTNESS OF BREATH: 1
CHEST TIGHTNESS: 0
ABDOMINAL PAIN: 0
VOMITING: 0
NAUSEA: 0
EYE REDNESS: 0
WHEEZING: 0
FACIAL SWELLING: 0
COUGH: 0

## 2019-09-03 NOTE — PROGRESS NOTES
[facility], [address] [place] [address]  Phone: [phone]  Fax: [phone]    OFFICE VISIT:  9/3/2019    Toan Choi - : 1945    Reason For Visit:  Lindsey Dominique is a 76 y.o. male who is here for Coronary Artery Disease (No cardiac symptoms today.)    HPI  Patient is here for follow-up today with a history of CAD, ischemic cardiomyopathy, AICD, hypertension, hyperlipidemia. He states he is doing well overall. He denies any chest pain, unusual dyspnea, orthopnea, PND, edema, sudden weight gain or palpitations. Nirav Thurston MD is PCP.   Toan Choi has the following history as recorded in [facility]:    Patient Active Problem List    Diagnosis Date Noted    Chronic systolic congestive heart failure ([facility] 2019    S/P CABG (coronary artery bypass graft) 05/10/2017    Abnormal nuclear stress test     Angina pectoris syndrome ([facility]     Abdominal pain 02/10/2017    Chronic constipation 02/10/2017    Liver mass 02/10/2017    Acalculous cholecystitis 2016    Hyperlipidemia     Acute myocardial infarction of anterolateral wall, subsequent episode of care [facility]) 2011    Single implantable cardioverter-defibrillator (ICD) in situ 2011    Cardiomyopathy, ischemic 2011    Coronary atherosclerosis of native coronary artery 2011    Essential hypertension 2011    Type 2 diabetes mellitus with complication, without long-term current use of insulin ([facility] 2011     Past Medical History:   Diagnosis Date    Acute myocardial infarction of anterolateral wall, subsequent episode of care ([facility] 2011    AICD discharge     Arthritis     CAD (coronary artery disease) of artery bypass graft     Cardiomyopathy, ischemic 2011    Coronary atherosclerosis of native coronary artery     Hyperlipidemia     Hyperlipidemia     Cholesterol management per Cassandra Martinez M.D.    Hypertension     ICD non cardiac problems  Report any new problems  Cardiovascular Fitness-Exercise as tolerated. Strive for 15 minutes of exercise most days of the week. Cardiac / HealthyDiet  Continue current medications as directed  Continue plan of treatment  It is always recommended that you bring your medicationsbottles with you to each visit - this is for your safety!        AMPARO Spring

## 2019-09-17 DIAGNOSIS — Z95.810 ICD (IMPLANTABLE CARDIOVERTER-DEFIBRILLATOR) IN PLACE: Primary | ICD-10-CM

## 2019-09-17 DIAGNOSIS — I50.22 CHRONIC SYSTOLIC CONGESTIVE HEART FAILURE (HCC): ICD-10-CM

## 2019-10-10 ENCOUNTER — OFFICE VISIT (OUTPATIENT)
Dept: SURGERY | Age: 74
End: 2019-10-10
Payer: MEDICARE

## 2019-10-10 ENCOUNTER — HOSPITAL ENCOUNTER (OUTPATIENT)
Dept: PREADMISSION TESTING | Age: 74
Discharge: HOME | End: 2019-10-14
Payer: MEDICARE

## 2019-10-10 VITALS — WEIGHT: 182 LBS | BODY MASS INDEX: 28.56 KG/M2 | HEIGHT: 67 IN

## 2019-10-10 VITALS
DIASTOLIC BLOOD PRESSURE: 62 MMHG | BODY MASS INDEX: 28.66 KG/M2 | HEIGHT: 67 IN | SYSTOLIC BLOOD PRESSURE: 100 MMHG | WEIGHT: 182.6 LBS

## 2019-10-10 DIAGNOSIS — D17.22 LIPOMA OF LEFT UPPER EXTREMITY: Primary | ICD-10-CM

## 2019-10-10 LAB
ANION GAP SERPL CALCULATED.3IONS-SCNC: 10 MMOL/L (ref 7–19)
BASOPHILS ABSOLUTE: 0.1 K/UL (ref 0–0.2)
BASOPHILS RELATIVE PERCENT: 1.7 % (ref 0–1)
BUN BLDV-MCNC: 26 MG/DL (ref 8–23)
CALCIUM SERPL-MCNC: 9.4 MG/DL (ref 8.8–10.2)
CHLORIDE BLD-SCNC: 102 MMOL/L (ref 98–111)
CO2: 28 MMOL/L (ref 22–29)
CREAT SERPL-MCNC: 1.2 MG/DL (ref 0.5–1.2)
EOSINOPHILS ABSOLUTE: 0.2 K/UL (ref 0–0.6)
EOSINOPHILS RELATIVE PERCENT: 4.2 % (ref 0–5)
GFR NON-AFRICAN AMERICAN: 59
GLUCOSE BLD-MCNC: 117 MG/DL (ref 74–109)
HCT VFR BLD CALC: 45.3 % (ref 42–52)
HEMOGLOBIN: 14.7 G/DL (ref 14–18)
IMMATURE GRANULOCYTES #: 0 K/UL
LYMPHOCYTES ABSOLUTE: 1.4 K/UL (ref 1.1–4.5)
LYMPHOCYTES RELATIVE PERCENT: 26.1 % (ref 20–40)
MCH RBC QN AUTO: 29.9 PG (ref 27–31)
MCHC RBC AUTO-ENTMCNC: 32.5 G/DL (ref 33–37)
MCV RBC AUTO: 92.1 FL (ref 80–94)
MONOCYTES ABSOLUTE: 0.5 K/UL (ref 0–0.9)
MONOCYTES RELATIVE PERCENT: 8.4 % (ref 0–10)
NEUTROPHILS ABSOLUTE: 3.2 K/UL (ref 1.5–7.5)
NEUTROPHILS RELATIVE PERCENT: 59.2 % (ref 50–65)
PDW BLD-RTO: 13.7 % (ref 11.5–14.5)
PLATELET # BLD: 177 K/UL (ref 130–400)
PMV BLD AUTO: 9.8 FL (ref 9.4–12.4)
POTASSIUM SERPL-SCNC: 4.6 MMOL/L (ref 3.5–5)
RBC # BLD: 4.92 M/UL (ref 4.7–6.1)
SODIUM BLD-SCNC: 140 MMOL/L (ref 136–145)
WBC # BLD: 5.5 K/UL (ref 4.8–10.8)

## 2019-10-10 PROCEDURE — 3017F COLORECTAL CA SCREEN DOC REV: CPT | Performed by: SURGERY

## 2019-10-10 PROCEDURE — 85025 COMPLETE CBC W/AUTO DIFF WBC: CPT

## 2019-10-10 PROCEDURE — 1123F ACP DISCUSS/DSCN MKR DOCD: CPT | Performed by: SURGERY

## 2019-10-10 PROCEDURE — 4040F PNEUMOC VAC/ADMIN/RCVD: CPT | Performed by: SURGERY

## 2019-10-10 PROCEDURE — 1036F TOBACCO NON-USER: CPT | Performed by: SURGERY

## 2019-10-10 PROCEDURE — G8417 CALC BMI ABV UP PARAM F/U: HCPCS | Performed by: SURGERY

## 2019-10-10 PROCEDURE — G8427 DOCREV CUR MEDS BY ELIG CLIN: HCPCS | Performed by: SURGERY

## 2019-10-10 PROCEDURE — G8598 ASA/ANTIPLAT THER USED: HCPCS | Performed by: SURGERY

## 2019-10-10 PROCEDURE — 80048 BASIC METABOLIC PNL TOTAL CA: CPT

## 2019-10-10 PROCEDURE — 93005 ELECTROCARDIOGRAM TRACING: CPT | Performed by: ANESTHESIOLOGY

## 2019-10-10 PROCEDURE — 99202 OFFICE O/P NEW SF 15 MIN: CPT | Performed by: SURGERY

## 2019-10-10 PROCEDURE — G8484 FLU IMMUNIZE NO ADMIN: HCPCS | Performed by: SURGERY

## 2019-10-10 RX ORDER — SOTALOL HYDROCHLORIDE 80 MG/1
80 TABLET ORAL 2 TIMES DAILY
COMMUNITY

## 2019-10-10 RX ORDER — BUDESONIDE AND FORMOTEROL FUMARATE DIHYDRATE 160; 4.5 UG/1; UG/1
2 AEROSOL RESPIRATORY (INHALATION) 2 TIMES DAILY PRN
COMMUNITY

## 2019-10-13 LAB
EKG P AXIS: 41 DEGREES
EKG P-R INTERVAL: 172 MS
EKG Q-T INTERVAL: 514 MS
EKG QRS DURATION: 144 MS
EKG QTC CALCULATION (BAZETT): 507 MS
EKG T AXIS: 41 DEGREES

## 2019-10-13 PROCEDURE — 93010 ELECTROCARDIOGRAM REPORT: CPT | Performed by: INTERNAL MEDICINE

## 2019-10-14 ENCOUNTER — ANESTHESIA (OUTPATIENT)
Dept: OPERATING ROOM | Age: 74
End: 2019-10-14
Payer: MEDICARE

## 2019-10-14 ENCOUNTER — ANESTHESIA EVENT (OUTPATIENT)
Dept: OPERATING ROOM | Age: 74
End: 2019-10-14
Payer: MEDICARE

## 2019-10-14 ENCOUNTER — HOSPITAL ENCOUNTER (OUTPATIENT)
Age: 74
Setting detail: OUTPATIENT SURGERY
Discharge: HOME | End: 2019-10-14
Attending: SURGERY | Admitting: SURGERY
Payer: MEDICARE

## 2019-10-14 VITALS
HEART RATE: 62 BPM | HEIGHT: 67 IN | OXYGEN SATURATION: 94 % | SYSTOLIC BLOOD PRESSURE: 122 MMHG | WEIGHT: 177 LBS | TEMPERATURE: 97.1 F | BODY MASS INDEX: 27.78 KG/M2 | DIASTOLIC BLOOD PRESSURE: 72 MMHG | RESPIRATION RATE: 18 BRPM

## 2019-10-14 VITALS
RESPIRATION RATE: 2 BRPM | SYSTOLIC BLOOD PRESSURE: 111 MMHG | DIASTOLIC BLOOD PRESSURE: 63 MMHG | OXYGEN SATURATION: 93 %

## 2019-10-14 DIAGNOSIS — G89.18 POST-OP PAIN: Primary | ICD-10-CM

## 2019-10-14 DIAGNOSIS — D17.22 BENIGN LIPOMATOUS NEOPLASM OF SKIN AND SUBCUTANEOUS TISSUE OF LEFT ARM: ICD-10-CM

## 2019-10-14 LAB
GLUCOSE BLD-MCNC: 95 MG/DL (ref 70–99)
PERFORMED ON: NORMAL

## 2019-10-14 PROCEDURE — 7100000000 HC PACU RECOVERY - FIRST 15 MIN: Performed by: SURGERY

## 2019-10-14 PROCEDURE — 3700000000 HC ANESTHESIA ATTENDED CARE: Performed by: SURGERY

## 2019-10-14 PROCEDURE — 6360000002 HC RX W HCPCS: Performed by: NURSE ANESTHETIST, CERTIFIED REGISTERED

## 2019-10-14 PROCEDURE — 2500000003 HC RX 250 WO HCPCS: Performed by: SURGERY

## 2019-10-14 PROCEDURE — 2580000003 HC RX 258: Performed by: ANESTHESIOLOGY

## 2019-10-14 PROCEDURE — 3600000014 HC SURGERY LEVEL 4 ADDTL 15MIN: Performed by: SURGERY

## 2019-10-14 PROCEDURE — 7100000010 HC PHASE II RECOVERY - FIRST 15 MIN: Performed by: SURGERY

## 2019-10-14 PROCEDURE — 7100000011 HC PHASE II RECOVERY - ADDTL 15 MIN: Performed by: SURGERY

## 2019-10-14 PROCEDURE — 2500000003 HC RX 250 WO HCPCS: Performed by: NURSE ANESTHETIST, CERTIFIED REGISTERED

## 2019-10-14 PROCEDURE — 3600000004 HC SURGERY LEVEL 4 BASE: Performed by: SURGERY

## 2019-10-14 PROCEDURE — 2709999900 HC NON-CHARGEABLE SUPPLY: Performed by: SURGERY

## 2019-10-14 PROCEDURE — 7100000001 HC PACU RECOVERY - ADDTL 15 MIN: Performed by: SURGERY

## 2019-10-14 PROCEDURE — 82948 REAGENT STRIP/BLOOD GLUCOSE: CPT

## 2019-10-14 PROCEDURE — 99219 PR INITIAL OBSERVATION CARE/DAY 50 MINUTES: CPT | Performed by: SURGERY

## 2019-10-14 PROCEDURE — 24073 EX ARM/ELBOW TUM DEEP 5 CM/>: CPT | Performed by: SURGERY

## 2019-10-14 PROCEDURE — 3700000001 HC ADD 15 MINUTES (ANESTHESIA): Performed by: SURGERY

## 2019-10-14 PROCEDURE — 88304 TISSUE EXAM BY PATHOLOGIST: CPT

## 2019-10-14 PROCEDURE — 2720000010 HC SURG SUPPLY STERILE: Performed by: SURGERY

## 2019-10-14 RX ORDER — TRAMADOL HYDROCHLORIDE 50 MG/1
50 TABLET ORAL EVERY 6 HOURS PRN
Status: CANCELLED | OUTPATIENT
Start: 2019-10-14

## 2019-10-14 RX ORDER — BUPIVACAINE HYDROCHLORIDE AND EPINEPHRINE 2.5; 5 MG/ML; UG/ML
INJECTION, SOLUTION INFILTRATION; PERINEURAL PRN
Status: DISCONTINUED | OUTPATIENT
Start: 2019-10-14 | End: 2019-10-14 | Stop reason: ALTCHOICE

## 2019-10-14 RX ORDER — SODIUM CHLORIDE 0.9 % (FLUSH) 0.9 %
10 SYRINGE (ML) INJECTION PRN
Status: CANCELLED | OUTPATIENT
Start: 2019-10-14

## 2019-10-14 RX ORDER — ENALAPRILAT 2.5 MG/2ML
1.25 INJECTION INTRAVENOUS
Status: DISCONTINUED | OUTPATIENT
Start: 2019-10-14 | End: 2019-10-14 | Stop reason: HOSPADM

## 2019-10-14 RX ORDER — TRAMADOL HYDROCHLORIDE 50 MG/1
50 TABLET ORAL EVERY 8 HOURS PRN
Qty: 15 TABLET | Refills: 0 | Status: SHIPPED | OUTPATIENT
Start: 2019-10-14 | End: 2019-10-19

## 2019-10-14 RX ORDER — FENTANYL CITRATE 50 UG/ML
INJECTION, SOLUTION INTRAMUSCULAR; INTRAVENOUS PRN
Status: DISCONTINUED | OUTPATIENT
Start: 2019-10-14 | End: 2019-10-14 | Stop reason: SDUPTHER

## 2019-10-14 RX ORDER — LABETALOL 20 MG/4 ML (5 MG/ML) INTRAVENOUS SYRINGE
5 EVERY 10 MIN PRN
Status: DISCONTINUED | OUTPATIENT
Start: 2019-10-14 | End: 2019-10-14 | Stop reason: HOSPADM

## 2019-10-14 RX ORDER — MEPERIDINE HYDROCHLORIDE 50 MG/ML
12.5 INJECTION INTRAMUSCULAR; INTRAVENOUS; SUBCUTANEOUS EVERY 5 MIN PRN
Status: DISCONTINUED | OUTPATIENT
Start: 2019-10-14 | End: 2019-10-14 | Stop reason: HOSPADM

## 2019-10-14 RX ORDER — METOCLOPRAMIDE HYDROCHLORIDE 5 MG/ML
10 INJECTION INTRAMUSCULAR; INTRAVENOUS
Status: DISCONTINUED | OUTPATIENT
Start: 2019-10-14 | End: 2019-10-14 | Stop reason: HOSPADM

## 2019-10-14 RX ORDER — DEXAMETHASONE SODIUM PHOSPHATE 10 MG/ML
INJECTION INTRAMUSCULAR; INTRAVENOUS PRN
Status: DISCONTINUED | OUTPATIENT
Start: 2019-10-14 | End: 2019-10-14 | Stop reason: SDUPTHER

## 2019-10-14 RX ORDER — SODIUM CHLORIDE, SODIUM LACTATE, POTASSIUM CHLORIDE, CALCIUM CHLORIDE 600; 310; 30; 20 MG/100ML; MG/100ML; MG/100ML; MG/100ML
INJECTION, SOLUTION INTRAVENOUS CONTINUOUS
Status: DISCONTINUED | OUTPATIENT
Start: 2019-10-14 | End: 2019-10-14 | Stop reason: HOSPADM

## 2019-10-14 RX ORDER — HYDRALAZINE HYDROCHLORIDE 20 MG/ML
5 INJECTION INTRAMUSCULAR; INTRAVENOUS EVERY 10 MIN PRN
Status: DISCONTINUED | OUTPATIENT
Start: 2019-10-14 | End: 2019-10-14 | Stop reason: HOSPADM

## 2019-10-14 RX ORDER — ONDANSETRON 2 MG/ML
4 INJECTION INTRAMUSCULAR; INTRAVENOUS EVERY 6 HOURS PRN
Status: CANCELLED | OUTPATIENT
Start: 2019-10-14

## 2019-10-14 RX ORDER — PROPOFOL 10 MG/ML
INJECTION, EMULSION INTRAVENOUS PRN
Status: DISCONTINUED | OUTPATIENT
Start: 2019-10-14 | End: 2019-10-14 | Stop reason: SDUPTHER

## 2019-10-14 RX ORDER — ONDANSETRON 2 MG/ML
INJECTION INTRAMUSCULAR; INTRAVENOUS PRN
Status: DISCONTINUED | OUTPATIENT
Start: 2019-10-14 | End: 2019-10-14 | Stop reason: SDUPTHER

## 2019-10-14 RX ORDER — MORPHINE SULFATE 4 MG/ML
2 INJECTION, SOLUTION INTRAMUSCULAR; INTRAVENOUS EVERY 5 MIN PRN
Status: DISCONTINUED | OUTPATIENT
Start: 2019-10-14 | End: 2019-10-14 | Stop reason: HOSPADM

## 2019-10-14 RX ORDER — PROMETHAZINE HYDROCHLORIDE 25 MG/ML
6.25 INJECTION, SOLUTION INTRAMUSCULAR; INTRAVENOUS
Status: DISCONTINUED | OUTPATIENT
Start: 2019-10-14 | End: 2019-10-14 | Stop reason: HOSPADM

## 2019-10-14 RX ORDER — MORPHINE SULFATE 4 MG/ML
4 INJECTION, SOLUTION INTRAMUSCULAR; INTRAVENOUS EVERY 5 MIN PRN
Status: DISCONTINUED | OUTPATIENT
Start: 2019-10-14 | End: 2019-10-14 | Stop reason: HOSPADM

## 2019-10-14 RX ORDER — LIDOCAINE HYDROCHLORIDE 10 MG/ML
INJECTION, SOLUTION INFILTRATION; PERINEURAL PRN
Status: DISCONTINUED | OUTPATIENT
Start: 2019-10-14 | End: 2019-10-14 | Stop reason: SDUPTHER

## 2019-10-14 RX ORDER — MORPHINE SULFATE 4 MG/ML
2 INJECTION, SOLUTION INTRAMUSCULAR; INTRAVENOUS
Status: CANCELLED | OUTPATIENT
Start: 2019-10-14

## 2019-10-14 RX ORDER — SODIUM CHLORIDE 0.9 % (FLUSH) 0.9 %
10 SYRINGE (ML) INJECTION EVERY 12 HOURS SCHEDULED
Status: CANCELLED | OUTPATIENT
Start: 2019-10-14

## 2019-10-14 RX ORDER — TRAMADOL HYDROCHLORIDE 50 MG/1
100 TABLET ORAL EVERY 6 HOURS PRN
Status: CANCELLED | OUTPATIENT
Start: 2019-10-14

## 2019-10-14 RX ORDER — MORPHINE SULFATE 4 MG/ML
4 INJECTION, SOLUTION INTRAMUSCULAR; INTRAVENOUS
Status: CANCELLED | OUTPATIENT
Start: 2019-10-14

## 2019-10-14 RX ORDER — DIPHENHYDRAMINE HYDROCHLORIDE 50 MG/ML
12.5 INJECTION INTRAMUSCULAR; INTRAVENOUS
Status: DISCONTINUED | OUTPATIENT
Start: 2019-10-14 | End: 2019-10-14 | Stop reason: HOSPADM

## 2019-10-14 RX ADMIN — SODIUM CHLORIDE, SODIUM LACTATE, POTASSIUM CHLORIDE, AND CALCIUM CHLORIDE: 600; 310; 30; 20 INJECTION, SOLUTION INTRAVENOUS at 09:22

## 2019-10-14 RX ADMIN — ONDANSETRON HYDROCHLORIDE 4 MG: 2 INJECTION, SOLUTION INTRAMUSCULAR; INTRAVENOUS at 11:12

## 2019-10-14 RX ADMIN — FENTANYL CITRATE 50 MCG: 50 INJECTION INTRAMUSCULAR; INTRAVENOUS at 10:38

## 2019-10-14 RX ADMIN — DEXAMETHASONE SODIUM PHOSPHATE 5 MG: 10 INJECTION INTRAMUSCULAR; INTRAVENOUS at 11:12

## 2019-10-14 RX ADMIN — PROPOFOL 120 MG: 10 INJECTION, EMULSION INTRAVENOUS at 10:38

## 2019-10-14 RX ADMIN — LIDOCAINE HYDROCHLORIDE 50 MG: 10 INJECTION, SOLUTION INFILTRATION; PERINEURAL at 10:38

## 2019-10-14 RX ADMIN — FENTANYL CITRATE 50 MCG: 50 INJECTION INTRAMUSCULAR; INTRAVENOUS at 11:17

## 2019-10-14 RX ADMIN — FENTANYL CITRATE 25 MCG: 50 INJECTION INTRAMUSCULAR; INTRAVENOUS at 11:25

## 2019-10-14 RX ADMIN — SODIUM CHLORIDE, SODIUM LACTATE, POTASSIUM CHLORIDE, AND CALCIUM CHLORIDE: 600; 310; 30; 20 INJECTION, SOLUTION INTRAVENOUS at 10:35

## 2019-10-14 ASSESSMENT — ENCOUNTER SYMPTOMS
SHORTNESS OF BREATH: 0
EYE PAIN: 0
BACK PAIN: 0
EYE REDNESS: 0
ABDOMINAL DISTENTION: 0
ABDOMINAL PAIN: 0
COUGH: 0

## 2019-10-14 ASSESSMENT — PAIN SCALES - GENERAL
PAINLEVEL_OUTOF10: 0
PAINLEVEL_OUTOF10: 0

## 2019-10-14 ASSESSMENT — LIFESTYLE VARIABLES: SMOKING_STATUS: 0

## 2019-10-16 DIAGNOSIS — I25.5 CARDIOMYOPATHY, ISCHEMIC: ICD-10-CM

## 2019-10-16 DIAGNOSIS — Z95.810 ICD (IMPLANTABLE CARDIOVERTER-DEFIBRILLATOR) IN PLACE: Primary | ICD-10-CM

## 2019-10-16 DIAGNOSIS — I50.22 CHRONIC SYSTOLIC CONGESTIVE HEART FAILURE (HCC): ICD-10-CM

## 2019-10-16 PROCEDURE — 93295 DEV INTERROG REMOTE 1/2/MLT: CPT | Performed by: CLINICAL NURSE SPECIALIST

## 2019-10-16 PROCEDURE — 93296 REM INTERROG EVL PM/IDS: CPT | Performed by: CLINICAL NURSE SPECIALIST

## 2019-10-24 ENCOUNTER — OFFICE VISIT (OUTPATIENT)
Dept: SURGERY | Age: 74
End: 2019-10-24

## 2019-10-24 VITALS — TEMPERATURE: 98 F | BODY MASS INDEX: 27.78 KG/M2 | HEIGHT: 67 IN | OXYGEN SATURATION: 98 % | WEIGHT: 177 LBS

## 2019-10-24 DIAGNOSIS — Z09 S/P EXCISION OF SKIN LESION, FOLLOW-UP EXAM: Primary | ICD-10-CM

## 2019-10-24 PROCEDURE — 99024 POSTOP FOLLOW-UP VISIT: CPT | Performed by: PHYSICIAN ASSISTANT

## 2019-11-15 DIAGNOSIS — I50.22 CHRONIC SYSTOLIC CONGESTIVE HEART FAILURE (HCC): ICD-10-CM

## 2019-11-15 DIAGNOSIS — Z95.810 ICD (IMPLANTABLE CARDIOVERTER-DEFIBRILLATOR) IN PLACE: Primary | ICD-10-CM

## 2019-11-24 ASSESSMENT — ENCOUNTER SYMPTOMS
DIARRHEA: 0
SORE THROAT: 0
WHEEZING: 0
NAUSEA: 0
COLOR CHANGE: 0
TROUBLE SWALLOWING: 0
SINUS PRESSURE: 0
BACK PAIN: 0
CHEST TIGHTNESS: 0
VOMITING: 0
ABDOMINAL PAIN: 0
SHORTNESS OF BREATH: 0
COUGH: 0
ABDOMINAL DISTENTION: 0
CONSTIPATION: 0

## 2019-12-17 ENCOUNTER — OFFICE VISIT (OUTPATIENT)
Dept: CARDIOLOGY | Age: 74
End: 2019-12-17
Payer: MEDICARE

## 2019-12-17 VITALS
HEIGHT: 67 IN | DIASTOLIC BLOOD PRESSURE: 62 MMHG | SYSTOLIC BLOOD PRESSURE: 104 MMHG | HEART RATE: 64 BPM | BODY MASS INDEX: 27.94 KG/M2 | WEIGHT: 178 LBS

## 2019-12-17 DIAGNOSIS — Z95.810 SINGLE IMPLANTABLE CARDIOVERTER-DEFIBRILLATOR (ICD) IN SITU: ICD-10-CM

## 2019-12-17 DIAGNOSIS — I25.10 ATHEROSCLEROSIS OF NATIVE CORONARY ARTERY OF NATIVE HEART WITHOUT ANGINA PECTORIS: Primary | ICD-10-CM

## 2019-12-17 DIAGNOSIS — I50.22 CHRONIC SYSTOLIC CONGESTIVE HEART FAILURE (HCC): ICD-10-CM

## 2019-12-17 DIAGNOSIS — I10 ESSENTIAL HYPERTENSION: ICD-10-CM

## 2019-12-17 DIAGNOSIS — E11.8 TYPE 2 DIABETES MELLITUS WITH COMPLICATION, WITHOUT LONG-TERM CURRENT USE OF INSULIN (HCC): ICD-10-CM

## 2019-12-17 PROCEDURE — G8417 CALC BMI ABV UP PARAM F/U: HCPCS | Performed by: CLINICAL NURSE SPECIALIST

## 2019-12-17 PROCEDURE — 2022F DILAT RTA XM EVC RTNOPTHY: CPT | Performed by: CLINICAL NURSE SPECIALIST

## 2019-12-17 PROCEDURE — G8484 FLU IMMUNIZE NO ADMIN: HCPCS | Performed by: CLINICAL NURSE SPECIALIST

## 2019-12-17 PROCEDURE — 1036F TOBACCO NON-USER: CPT | Performed by: CLINICAL NURSE SPECIALIST

## 2019-12-17 PROCEDURE — 3046F HEMOGLOBIN A1C LEVEL >9.0%: CPT | Performed by: CLINICAL NURSE SPECIALIST

## 2019-12-17 PROCEDURE — G8598 ASA/ANTIPLAT THER USED: HCPCS | Performed by: CLINICAL NURSE SPECIALIST

## 2019-12-17 PROCEDURE — 1123F ACP DISCUSS/DSCN MKR DOCD: CPT | Performed by: CLINICAL NURSE SPECIALIST

## 2019-12-17 PROCEDURE — 3017F COLORECTAL CA SCREEN DOC REV: CPT | Performed by: CLINICAL NURSE SPECIALIST

## 2019-12-17 PROCEDURE — 4040F PNEUMOC VAC/ADMIN/RCVD: CPT | Performed by: CLINICAL NURSE SPECIALIST

## 2019-12-17 PROCEDURE — 93282 PRGRMG EVAL IMPLANTABLE DFB: CPT | Performed by: CLINICAL NURSE SPECIALIST

## 2019-12-17 PROCEDURE — 99213 OFFICE O/P EST LOW 20 MIN: CPT | Performed by: CLINICAL NURSE SPECIALIST

## 2019-12-17 PROCEDURE — G8427 DOCREV CUR MEDS BY ELIG CLIN: HCPCS | Performed by: CLINICAL NURSE SPECIALIST

## 2019-12-17 ASSESSMENT — ENCOUNTER SYMPTOMS
VOMITING: 0
FACIAL SWELLING: 0
NAUSEA: 0
EYE REDNESS: 0
CHEST TIGHTNESS: 0
COUGH: 0
SHORTNESS OF BREATH: 1
WHEEZING: 0
ABDOMINAL PAIN: 0

## 2020-02-14 ENCOUNTER — TELEPHONE (OUTPATIENT)
Dept: CARDIOLOGY | Age: 75
End: 2020-02-14

## 2020-02-14 NOTE — TELEPHONE ENCOUNTER
----- Message from AMPARO Almonte sent at 2/13/2020  4:31 PM CST -----  Please be sure to schedule GEN change.   Thank you

## 2020-02-14 NOTE — TELEPHONE ENCOUNTER
Spoke with patient. He is scheduled for 2/18 arrival of 730. Referral placed. Other instructions given below to patient. [place] at the [address] [facility] and [address] [facility] located on the first floor of [place] [address] through hospital main entrance and turn immediately to your left. Generator replacement    Prep Instructions:    1. Stop warfarin/Coumadin two days prior to this procedure. 2.  Do not eat or drink anything after midnight the night before your procedure. May take morning medications with a sip of water unless otherwise directed not to.  3.  You should arrange to have someone take you home rather than drive yourself. 4.  Further plans will be made following your procedure. If for any reason you are unable to keep this appointment, please contact [facility], [phone], as soon as possible to reschedule.

## 2020-02-14 NOTE — TELEPHONE ENCOUNTER
Patient is a former patient of Dr. Felipe Li. Called patient to find out if he has a preference on MD.  He stated he wife is seeing Dr. Rosalie Garcia and he would like to have  Dr. Rosalie Garcia. Will send message to Dr. Henry's MA for him to review.

## 2020-02-18 ENCOUNTER — HOSPITAL ENCOUNTER (OUTPATIENT)
Dept: CARDIAC CATH/INVASIVE PROCEDURES | Age: 75
Discharge: HOME | End: 2020-02-18
Attending: INTERNAL MEDICINE | Admitting: INTERNAL MEDICINE
Payer: MEDICARE

## 2020-02-18 ENCOUNTER — APPOINTMENT (OUTPATIENT)
Dept: GENERAL RADIOLOGY | Age: 75
End: 2020-02-18
Attending: INTERNAL MEDICINE
Payer: MEDICARE

## 2020-02-18 VITALS
WEIGHT: 185 LBS | BODY MASS INDEX: 29.03 KG/M2 | RESPIRATION RATE: 24 BRPM | HEIGHT: 67 IN | TEMPERATURE: 97.4 F | DIASTOLIC BLOOD PRESSURE: 100 MMHG | SYSTOLIC BLOOD PRESSURE: 141 MMHG | OXYGEN SATURATION: 94 % | HEART RATE: 68 BPM

## 2020-02-18 LAB
ANION GAP SERPL CALCULATED.3IONS-SCNC: 11 MMOL/L (ref 7–19)
BILIRUBIN URINE: NEGATIVE
BLOOD, URINE: NEGATIVE
BUN BLDV-MCNC: 29 MG/DL (ref 8–23)
CALCIUM SERPL-MCNC: 9.3 MG/DL (ref 8.8–10.2)
CHLORIDE BLD-SCNC: 104 MMOL/L (ref 98–111)
CLARITY: CLEAR
CO2: 26 MMOL/L (ref 22–29)
COLOR: YELLOW
CREAT SERPL-MCNC: 1.1 MG/DL (ref 0.5–1.2)
EKG P AXIS: 57 DEGREES
EKG P-R INTERVAL: 174 MS
EKG Q-T INTERVAL: 500 MS
EKG QRS DURATION: 144 MS
EKG QTC CALCULATION (BAZETT): 497 MS
EKG T AXIS: 65 DEGREES
EPITHELIAL CELLS, UA: NORMAL /HPF
GFR NON-AFRICAN AMERICAN: >60
GLUCOSE BLD-MCNC: 107 MG/DL (ref 74–109)
GLUCOSE URINE: NEGATIVE MG/DL
HCT VFR BLD CALC: 45.2 % (ref 42–52)
HEMOGLOBIN: 14.5 G/DL (ref 14–18)
KETONES, URINE: NEGATIVE MG/DL
LEUKOCYTE ESTERASE, URINE: NEGATIVE
MCH RBC QN AUTO: 29.5 PG (ref 27–31)
MCHC RBC AUTO-ENTMCNC: 32.1 G/DL (ref 33–37)
MCV RBC AUTO: 92.1 FL (ref 80–94)
NITRITE, URINE: NEGATIVE
PDW BLD-RTO: 13.5 % (ref 11.5–14.5)
PH UA: 5.5 (ref 5–8)
PLATELET # BLD: 167 K/UL (ref 130–400)
PMV BLD AUTO: 9.3 FL (ref 9.4–12.4)
POTASSIUM SERPL-SCNC: 3.9 MMOL/L (ref 3.5–5)
PRO-BNP: 4120 PG/ML (ref 0–900)
PROTEIN UA: 30 MG/DL
RBC # BLD: 4.91 M/UL (ref 4.7–6.1)
RBC UA: NORMAL /HPF (ref 0–2)
SODIUM BLD-SCNC: 141 MMOL/L (ref 136–145)
SPECIFIC GRAVITY UA: 1.02 (ref 1–1.03)
UROBILINOGEN, URINE: 0.2 E.U./DL
WBC # BLD: 5 K/UL (ref 4.8–10.8)
WBC UA: NORMAL /HPF (ref 0–5)

## 2020-02-18 PROCEDURE — 81001 URINALYSIS AUTO W/SCOPE: CPT

## 2020-02-18 PROCEDURE — 93005 ELECTROCARDIOGRAM TRACING: CPT | Performed by: INTERNAL MEDICINE

## 2020-02-18 PROCEDURE — 2500000003 HC RX 250 WO HCPCS

## 2020-02-18 PROCEDURE — 33262 RMVL& REPLC PULSE GEN 1 LEAD: CPT

## 2020-02-18 PROCEDURE — 99152 MOD SED SAME PHYS/QHP 5/>YRS: CPT

## 2020-02-18 PROCEDURE — 99153 MOD SED SAME PHYS/QHP EA: CPT

## 2020-02-18 PROCEDURE — 80048 BASIC METABOLIC PNL TOTAL CA: CPT

## 2020-02-18 PROCEDURE — 36415 COLL VENOUS BLD VENIPUNCTURE: CPT

## 2020-02-18 PROCEDURE — 93010 ELECTROCARDIOGRAM REPORT: CPT | Performed by: INTERNAL MEDICINE

## 2020-02-18 PROCEDURE — 2780000010 HC IMPLANT OTHER

## 2020-02-18 PROCEDURE — 33262 RMVL& REPLC PULSE GEN 1 LEAD: CPT | Performed by: INTERNAL MEDICINE

## 2020-02-18 PROCEDURE — 6360000002 HC RX W HCPCS: Performed by: INTERNAL MEDICINE

## 2020-02-18 PROCEDURE — 99024 POSTOP FOLLOW-UP VISIT: CPT | Performed by: INTERNAL MEDICINE

## 2020-02-18 PROCEDURE — C1722 AICD, SINGLE CHAMBER: HCPCS

## 2020-02-18 PROCEDURE — 6360000002 HC RX W HCPCS

## 2020-02-18 PROCEDURE — 6370000000 HC RX 637 (ALT 250 FOR IP): Performed by: INTERNAL MEDICINE

## 2020-02-18 PROCEDURE — 83880 ASSAY OF NATRIURETIC PEPTIDE: CPT

## 2020-02-18 PROCEDURE — 2580000003 HC RX 258: Performed by: INTERNAL MEDICINE

## 2020-02-18 PROCEDURE — 71046 X-RAY EXAM CHEST 2 VIEWS: CPT

## 2020-02-18 PROCEDURE — 99152 MOD SED SAME PHYS/QHP 5/>YRS: CPT | Performed by: INTERNAL MEDICINE

## 2020-02-18 PROCEDURE — 85027 COMPLETE CBC AUTOMATED: CPT

## 2020-02-18 RX ORDER — ONDANSETRON 2 MG/ML
4 INJECTION INTRAMUSCULAR; INTRAVENOUS EVERY 6 HOURS PRN
Status: DISCONTINUED | OUTPATIENT
Start: 2020-02-18 | End: 2020-02-18 | Stop reason: HOSPADM

## 2020-02-18 RX ORDER — ALPRAZOLAM 0.5 MG/1
0.5 TABLET ORAL
Status: CANCELLED | OUTPATIENT
Start: 2020-02-18 | End: 2020-02-18

## 2020-02-18 RX ORDER — CHLORHEXIDINE GLUCONATE 4 G/100ML
SOLUTION TOPICAL ONCE
Status: COMPLETED | OUTPATIENT
Start: 2020-02-18 | End: 2020-02-18

## 2020-02-18 RX ORDER — SODIUM CHLORIDE 9 MG/ML
INJECTION, SOLUTION INTRAVENOUS CONTINUOUS
Status: DISCONTINUED | OUTPATIENT
Start: 2020-02-18 | End: 2020-02-18 | Stop reason: SDUPTHER

## 2020-02-18 RX ORDER — ONDANSETRON 2 MG/ML
4 INJECTION INTRAMUSCULAR; INTRAVENOUS EVERY 6 HOURS PRN
Status: CANCELLED | OUTPATIENT
Start: 2020-02-18

## 2020-02-18 RX ORDER — ACETAMINOPHEN 325 MG/1
650 TABLET ORAL EVERY 4 HOURS PRN
Status: DISCONTINUED | OUTPATIENT
Start: 2020-02-18 | End: 2020-02-18 | Stop reason: HOSPADM

## 2020-02-18 RX ORDER — SODIUM CHLORIDE 9 MG/ML
INJECTION, SOLUTION INTRAVENOUS CONTINUOUS
Status: DISCONTINUED | OUTPATIENT
Start: 2020-02-18 | End: 2020-02-18 | Stop reason: HOSPADM

## 2020-02-18 RX ORDER — SODIUM CHLORIDE 9 MG/ML
INJECTION, SOLUTION INTRAVENOUS CONTINUOUS
Status: CANCELLED | OUTPATIENT
Start: 2020-02-18

## 2020-02-18 RX ORDER — SODIUM CHLORIDE 0.9 % (FLUSH) 0.9 %
10 SYRINGE (ML) INJECTION EVERY 12 HOURS SCHEDULED
Status: DISCONTINUED | OUTPATIENT
Start: 2020-02-18 | End: 2020-02-18 | Stop reason: HOSPADM

## 2020-02-18 RX ORDER — SODIUM CHLORIDE 0.9 % (FLUSH) 0.9 %
10 SYRINGE (ML) INJECTION PRN
Status: DISCONTINUED | OUTPATIENT
Start: 2020-02-18 | End: 2020-02-18 | Stop reason: HOSPADM

## 2020-02-18 RX ORDER — ASPIRIN 81 MG/1
81 TABLET ORAL ONCE
Status: CANCELLED | OUTPATIENT
Start: 2020-02-18 | End: 2020-02-18

## 2020-02-18 RX ADMIN — CHLORHEXIDINE GLUCONATE: 213 SOLUTION TOPICAL at 08:33

## 2020-02-18 RX ADMIN — MUPIROCIN: 20 OINTMENT TOPICAL at 09:52

## 2020-02-18 RX ADMIN — SODIUM CHLORIDE: 9 INJECTION, SOLUTION INTRAVENOUS at 08:22

## 2020-02-18 RX ADMIN — Medication 2 G: at 14:08

## 2020-02-18 ASSESSMENT — ENCOUNTER SYMPTOMS
SHORTNESS OF BREATH: 0
DIARRHEA: 0
NAUSEA: 0
EYES NEGATIVE: 1
RESPIRATORY NEGATIVE: 1
VOMITING: 0
GASTROINTESTINAL NEGATIVE: 1

## 2020-02-18 NOTE — H&P
History    Not on file   Social Needs    Financial resource strain: Not on file    Food insecurity:     Worry: Not on file     Inability: Not on file    Transportation needs:     Medical: Not on file     Non-medical: Not on file   Tobacco Use    Smoking status: Never Smoker    Smokeless tobacco: Former User     Types: Chew   Substance and Sexual Activity    Alcohol use: No     Alcohol/week: 0.0 standard drinks    Drug use: No    Sexual activity: Not on file   Lifestyle    Physical activity:     Days per week: Not on file     Minutes per session: Not on file    Stress: Not on file   Relationships    Social connections:     Talks on phone: Not on file     Gets together: Not on file     Attends [employer] service: Not on file     Active member of club or organization: Not on file     Attends meetings of clubs or organizations: Not on file     Relationship status: Not on file    Intimate partner violence:     Fear of current or ex partner: Not on file     Emotionally abused: Not on file     Physically abused: Not on file     Forced sexual activity: Not on file   Other Topics Concern    Not on file   Social History Narrative    Not on file       Family History:       Adopted: Yes   Problem Relation Age of Onset    Diabetes Other     Heart Disease Other            Physical Exam:    Vitals: /67   Pulse 53   Temp 97.4 °F (36.3 °C) (Temporal)   Resp 19   Ht 5' 6.5\" (1.689 m)   Wt 185 lb (83.9 kg)   SpO2 92%   BMI 29.41 kg/m²   24HR INTAKE/OUTPUT:  No intake or output data in the 24 hours ending 02/18/20 5839    Physical Exam  Vitals signs reviewed. Constitutional:       General: He is not in acute distress. Appearance: Normal appearance. He is well-developed. He is obese. He is not ill-appearing, toxic-appearing or diaphoretic. HENT:      Head: Normocephalic and atraumatic.       Nose: Nose normal.      Mouth/Throat:      Mouth: Mucous membranes are moist.      Pharynx: Oropharynx is

## 2020-02-20 ENCOUNTER — TELEPHONE (OUTPATIENT)
Dept: CARDIOLOGY | Age: 75
End: 2020-02-20

## 2020-02-21 NOTE — PROCEDURES
time:1502      Pacemaker Data:       Right Ventricular lead    Medtronic  Model   [id number]   serial #   [id number]  Volt    N/A    V     PW    N/A ms     Current   NA   ma    I  Impedance:   382   ohms       Slew rate:   NA   V/sec  R wave:   6.1 mv      Generator  Medtronic  Model   [id number]    Serial   CWG [id number]      Estimated Blood Loss:  Minimal         Complications:  None; patient tolerated the procedure well.            Disposition: Admitted to outpatient recovery           Condition: stable      Tip Helms MD 2/21/2020 9:30 AM

## 2020-02-26 ENCOUNTER — NURSE ONLY (OUTPATIENT)
Dept: CARDIOLOGY | Age: 75
End: 2020-02-26

## 2020-02-26 PROCEDURE — 99024 POSTOP FOLLOW-UP VISIT: CPT | Performed by: CLINICAL NURSE SPECIALIST

## 2020-03-03 ENCOUNTER — NURSE ONLY (OUTPATIENT)
Dept: CARDIOLOGY | Age: 75
End: 2020-03-03

## 2020-03-03 PROCEDURE — 99024 POSTOP FOLLOW-UP VISIT: CPT | Performed by: CLINICAL NURSE SPECIALIST

## 2020-03-09 ENCOUNTER — OFFICE VISIT (OUTPATIENT)
Dept: CARDIOLOGY | Age: 75
End: 2020-03-09

## 2020-03-09 VITALS
DIASTOLIC BLOOD PRESSURE: 72 MMHG | HEIGHT: 66 IN | SYSTOLIC BLOOD PRESSURE: 100 MMHG | HEART RATE: 56 BPM | BODY MASS INDEX: 30.86 KG/M2 | WEIGHT: 192 LBS

## 2020-03-09 PROBLEM — T82.837D: Status: ACTIVE | Noted: 2020-03-09

## 2020-03-09 PROCEDURE — 99024 POSTOP FOLLOW-UP VISIT: CPT | Performed by: INTERNAL MEDICINE

## 2020-03-09 NOTE — PROGRESS NOTES
mention of complication, not stated as uncontrolled      Past Surgical History:   Procedure Laterality Date    APPENDECTOMY      ARM SURGERY Left 10/14/2019    EXCISION LIPOMA LEFT ARM performed by Libia Jack MD at [place] [address]  4/29/14  [email or web address]    2 vessel CAD.  EF 45%    CARDIAC PACEMAKER PLACEMENT      medtronic     CHOLECYSTECTOMY, LAPAROSCOPIC N/A 9/26/2016    CHOLECYSTECTOMY LAPAROSCOPIC performed by Reed Felix MD at [address] [place]  01/03/2011    2V CABG (LIMA - LAD, SVG - PLM)    DIAGNOSTIC CARDIAC CATH LAB PROCEDURE  12/25/10    left heart cath, selective coronary arteriography, left ventriculography, direct infarct stenting to the left anterior descending coronary artery, left iliac arteriography, right iliac arteriography via left common femoral artery     FINGER AMPUTATION      traumatic left index finger    HERNIA REPAIR      ventral    MIDDLE EAR SURGERY Right 1966    tumor removed, some hearing loss     PACEMAKER PLACEMENT      MEDTRONIC PACEMAKER/DEFIB    AL COLONOSCOPY W/BIOPSY SINGLE/MULTIPLE N/A 4/3/2017    Dr JOSE Herrera-diverticular disease, benign ulcerated inflammatory polyp demonstrating a granulation tissue response--1 yr recall    AL EGD TRANSORAL BIOPSY SINGLE/MULTIPLE N/A 4/3/2017    Dr Nora Reynolds hiatal hernia, gastritis, chronic inflammation     Family History   Adopted: Yes   Problem Relation Age of Onset    Diabetes Other     Heart Disease Other      Allergies   Allergen Reactions    Ambien [Zolpidem]      Unknown      Lortab [Hydrocodone-Acetaminophen] Other (See Comments)     One tab daily caused bowel obstruction    Phenergan  [Promethazine Hcl] Rash     Current Outpatient Medications   Medication Sig Dispense Refill    blood glucose test strips (EXACTECH TEST) strip USE 1 STRIP FOR BLOOD TEST TWICE A DAY ALTERNATING TIMES EACH DAY *STABLE INSULIN THERAPY*     DEC 50,7707      mupirocin (BACTROBAN) 2 % ointment mupirocin 2 % topical ointment   APPLY A SMALL AMOUNT TO THE AFFECTED AREA BY TOPICAL ROUTE 3 TIMES PER DAY      sotalol (BETAPACE) 80 MG tablet Take 80 mg by mouth 2 times daily       budesonide-formoterol (SYMBICORT) 160-4.5 MCG/ACT AERO Inhale 2 puffs into the lungs 2 times daily as needed       bumetanide (BUMEX) 2 MG tablet Take 2 mg by mouth Twice a Week Indications: Saturday and Tuesday       escitalopram (LEXAPRO) 10 MG tablet Take 10 mg by mouth daily      lisinopril (PRINIVIL;ZESTRIL) 20 MG tablet Take 10 mg by mouth daily       metFORMIN (GLUCOPHAGE) 500 MG tablet Take 250 mg by mouth 2 times daily (with meals)       PROAIR  (90 Base) MCG/ACT inhaler Inhale 2 puffs into the lungs as needed for Shortness of Breath   1    furosemide (LASIX) 40 MG tablet Take 40 mg by mouth daily   2    potassium chloride (KLOR-CON M) 20 MEQ extended release tablet Take 10 mEq by mouth daily   2    simvastatin (ZOCOR) 80 MG tablet Take 40 mg by mouth nightly       pantoprazole (PROTONIX) 40 MG tablet Take 40 mg by mouth daily.  mirtazapine (REMERON SOL-TAB) 30 MG disintegrating tablet Take 30 mg by mouth nightly       nitroGLYCERIN (NITROSTAT) 0.4 MG SL tablet Place 1 tablet under the tongue every 5 minutes as needed for Chest pain 25 tablet 3     No current facility-administered medications for this visit.       Social History     Socioeconomic History    Marital status:      Spouse name: Not on file    Number of children: Not on file    Years of education: Not on file    Highest education level: Not on file   Occupational History    Not on file   Social Needs    Financial resource strain: Not on file    Food insecurity     Worry: Not on file     Inability: Not on file    Transportation needs     Medical: Not on file     Non-medical: Not on file   Tobacco Use    Smoking status: Never Smoker    Smokeless tobacco: Former User     Types: Chew   Substance and Sexual Activity    Alcohol graft)     6. ICD (implantable cardioverter-defibrillator) pocket hematoma, subsequent encounter         PLAN:  No orders of the defined types were placed in this encounter. No orders of the defined types were placed in this encounter. 1. Continue present medications  2. Continued conservative management recommend follow-up assessment in 1 to 2 weeks    No follow-ups on file. Fatmata Norris MD 3/9/2020 11:10 AM CDT    [facility]      Thisdictation was generated by voice recognition computer software. Although all attempts are made to edit the dictation for accuracy, there may be errors in the transcription that are not intended.

## 2020-03-18 ENCOUNTER — TELEPHONE (OUTPATIENT)
Dept: CARDIOLOGY | Age: 75
End: 2020-03-18

## 2020-03-18 PROCEDURE — 93296 REM INTERROG EVL PM/IDS: CPT | Performed by: CLINICAL NURSE SPECIALIST

## 2020-03-18 PROCEDURE — 93295 DEV INTERROG REMOTE 1/2/MLT: CPT | Performed by: CLINICAL NURSE SPECIALIST

## 2020-03-19 ENCOUNTER — OFFICE VISIT (OUTPATIENT)
Dept: CARDIOLOGY | Age: 75
End: 2020-03-19
Payer: MEDICARE

## 2020-03-19 VITALS
DIASTOLIC BLOOD PRESSURE: 54 MMHG | BODY MASS INDEX: 31.02 KG/M2 | SYSTOLIC BLOOD PRESSURE: 108 MMHG | WEIGHT: 193 LBS | HEIGHT: 66 IN | HEART RATE: 64 BPM

## 2020-03-19 PROCEDURE — 1036F TOBACCO NON-USER: CPT | Performed by: INTERNAL MEDICINE

## 2020-03-19 PROCEDURE — G8417 CALC BMI ABV UP PARAM F/U: HCPCS | Performed by: INTERNAL MEDICINE

## 2020-03-19 PROCEDURE — 4040F PNEUMOC VAC/ADMIN/RCVD: CPT | Performed by: INTERNAL MEDICINE

## 2020-03-19 PROCEDURE — 99024 POSTOP FOLLOW-UP VISIT: CPT | Performed by: INTERNAL MEDICINE

## 2020-03-19 PROCEDURE — G8427 DOCREV CUR MEDS BY ELIG CLIN: HCPCS | Performed by: INTERNAL MEDICINE

## 2020-03-19 PROCEDURE — 3017F COLORECTAL CA SCREEN DOC REV: CPT | Performed by: INTERNAL MEDICINE

## 2020-03-19 PROCEDURE — G8484 FLU IMMUNIZE NO ADMIN: HCPCS | Performed by: INTERNAL MEDICINE

## 2020-03-19 PROCEDURE — 1123F ACP DISCUSS/DSCN MKR DOCD: CPT | Performed by: INTERNAL MEDICINE

## 2020-03-19 PROCEDURE — 93289 INTERROG DEVICE EVAL HEART: CPT | Performed by: INTERNAL MEDICINE

## 2020-03-19 ASSESSMENT — ENCOUNTER SYMPTOMS
VOMITING: 0
NAUSEA: 0
EYES NEGATIVE: 1
DIARRHEA: 0
RESPIRATORY NEGATIVE: 1
SHORTNESS OF BREATH: 0
GASTROINTESTINAL NEGATIVE: 1

## 2020-03-19 NOTE — PROGRESS NOTES
bowel obstruction    Phenergan  [Promethazine Hcl] Rash     Current Outpatient Medications   Medication Sig Dispense Refill    blood glucose test strips (EXACTECH TEST) strip USE 1 STRIP FOR BLOOD TEST TWICE A DAY ALTERNATING TIMES EACH DAY *STABLE INSULIN THERAPY*     DEC 30,2019      mupirocin (BACTROBAN) 2 % ointment mupirocin 2 % topical ointment   APPLY A SMALL AMOUNT TO THE AFFECTED AREA BY TOPICAL ROUTE 3 TIMES PER DAY      sotalol (BETAPACE) 80 MG tablet Take 80 mg by mouth 2 times daily       budesonide-formoterol (SYMBICORT) 160-4.5 MCG/ACT AERO Inhale 2 puffs into the lungs 2 times daily as needed       bumetanide (BUMEX) 2 MG tablet Take 2 mg by mouth Twice a Week Indications: Saturday and Tuesday       escitalopram (LEXAPRO) 10 MG tablet Take 10 mg by mouth daily      lisinopril (PRINIVIL;ZESTRIL) 20 MG tablet Take 10 mg by mouth daily       metFORMIN (GLUCOPHAGE) 500 MG tablet Take 250 mg by mouth 2 times daily (with meals)       PROAIR  (90 Base) MCG/ACT inhaler Inhale 2 puffs into the lungs as needed for Shortness of Breath   1    furosemide (LASIX) 40 MG tablet Take 40 mg by mouth daily   2    potassium chloride (KLOR-CON M) 20 MEQ extended release tablet Take 10 mEq by mouth daily   2    simvastatin (ZOCOR) 80 MG tablet Take 40 mg by mouth nightly       pantoprazole (PROTONIX) 40 MG tablet Take 40 mg by mouth daily.  mirtazapine (REMERON SOL-TAB) 30 MG disintegrating tablet Take 30 mg by mouth nightly       nitroGLYCERIN (NITROSTAT) 0.4 MG SL tablet Place 1 tablet under the tongue every 5 minutes as needed for Chest pain 25 tablet 3     No current facility-administered medications for this visit.       Social History     Socioeconomic History    Marital status:      Spouse name: Not on file    Number of children: Not on file    Years of education: Not on file    Highest education level: Not on file   Occupational History    Not on file   Social Needs    Financial resource strain: Not on file    Food insecurity     Worry: Not on file     Inability: Not on file    Transportation needs     Medical: Not on file     Non-medical: Not on file   Tobacco Use    Smoking status: Never Smoker    Smokeless tobacco: Former User     Types: Chew   Substance and Sexual Activity    Alcohol use: No     Alcohol/week: 0.0 standard drinks    Drug use: No    Sexual activity: Not on file   Lifestyle    Physical activity     Days per week: Not on file     Minutes per session: Not on file    Stress: Not on file   Relationships    Social connections     Talks on phone: Not on file     Gets together: Not on file     Attends Yazidism service: Not on file     Active member of club or organization: Not on file     Attends meetings of clubs or organizations: Not on file     Relationship status: Not on file    Intimate partner violence     Fear of current or ex partner: Not on file     Emotionally abused: Not on file     Physically abused: Not on file     Forced sexual activity: Not on file   Other Topics Concern    Not on file   Social History Narrative    Not on file       Physical Examination:  BP (!) 108/54   Pulse 64   Ht 5' 6\" (1.676 m)   Wt 193 lb (87.5 kg)   BMI 31.15 kg/m²   Physical Exam  Vitals signs reviewed. Constitutional:       Appearance: He is well-developed. Neck:      Vascular: No carotid bruit or JVD. Cardiovascular:      Rate and Rhythm: Normal rate and regular rhythm. Heart sounds: Normal heart sounds. No murmur. No friction rub. No gallop. Comments: Small medium postoperative wound hematoma appears to be shrinking in size suture line intact no drainage no erythema  Pulmonary:      Effort: Pulmonary effort is normal. No respiratory distress. Breath sounds: Normal breath sounds. No wheezing or rales. Abdominal:      General: There is no distension. Tenderness: There is no abdominal tenderness.    Lymphadenopathy:      Cervical: No

## 2020-04-02 ENCOUNTER — OFFICE VISIT (OUTPATIENT)
Dept: CARDIOLOGY | Age: 75
End: 2020-04-02
Payer: MEDICARE

## 2020-04-02 VITALS
WEIGHT: 191 LBS | DIASTOLIC BLOOD PRESSURE: 72 MMHG | SYSTOLIC BLOOD PRESSURE: 108 MMHG | BODY MASS INDEX: 30.7 KG/M2 | HEART RATE: 56 BPM | HEIGHT: 66 IN

## 2020-04-02 PROCEDURE — G8427 DOCREV CUR MEDS BY ELIG CLIN: HCPCS | Performed by: INTERNAL MEDICINE

## 2020-04-02 PROCEDURE — 4040F PNEUMOC VAC/ADMIN/RCVD: CPT | Performed by: INTERNAL MEDICINE

## 2020-04-02 PROCEDURE — 99024 POSTOP FOLLOW-UP VISIT: CPT | Performed by: INTERNAL MEDICINE

## 2020-04-02 PROCEDURE — G8417 CALC BMI ABV UP PARAM F/U: HCPCS | Performed by: INTERNAL MEDICINE

## 2020-04-02 PROCEDURE — 1123F ACP DISCUSS/DSCN MKR DOCD: CPT | Performed by: INTERNAL MEDICINE

## 2020-04-02 PROCEDURE — 3017F COLORECTAL CA SCREEN DOC REV: CPT | Performed by: INTERNAL MEDICINE

## 2020-04-02 PROCEDURE — 1036F TOBACCO NON-USER: CPT | Performed by: INTERNAL MEDICINE

## 2020-04-02 PROCEDURE — 93000 ELECTROCARDIOGRAM COMPLETE: CPT | Performed by: INTERNAL MEDICINE

## 2020-04-02 ASSESSMENT — ENCOUNTER SYMPTOMS
GASTROINTESTINAL NEGATIVE: 1
DIARRHEA: 0
NAUSEA: 0
VOMITING: 0
EYES NEGATIVE: 1
RESPIRATORY NEGATIVE: 1
SHORTNESS OF BREATH: 0

## 2020-04-02 NOTE — PROGRESS NOTES
Not on file   Occupational History    Not on file   Social Needs    Financial resource strain: Not on file    Food insecurity     Worry: Not on file     Inability: Not on file    Transportation needs     Medical: Not on file     Non-medical: Not on file   Tobacco Use    Smoking status: Never Smoker    Smokeless tobacco: Former User     Types: Chew   Substance and Sexual Activity    Alcohol use: No     Alcohol/week: 0.0 standard drinks    Drug use: No    Sexual activity: Not on file   Lifestyle    Physical activity     Days per week: Not on file     Minutes per session: Not on file    Stress: Not on file   Relationships    Social connections     Talks on phone: Not on file     Gets together: Not on file     Attends Caodaism service: Not on file     Active member of club or organization: Not on file     Attends meetings of clubs or organizations: Not on file     Relationship status: Not on file    Intimate partner violence     Fear of current or ex partner: Not on file     Emotionally abused: Not on file     Physically abused: Not on file     Forced sexual activity: Not on file   Other Topics Concern    Not on file   Social History Narrative    Not on file       Physical Examination:  /72   Pulse 56   Ht 5' 6\" (1.676 m)   Wt 191 lb (86.6 kg)   BMI 30.83 kg/m²   Physical Exam  Vitals signs reviewed. Constitutional:       Appearance: He is well-developed. Neck:      Vascular: No carotid bruit or JVD. Cardiovascular:      Rate and Rhythm: Normal rate and regular rhythm. Heart sounds: Normal heart sounds. No murmur. No friction rub. No gallop. Comments: Left infraclavicular ICD pocket appears to be well-healed with only a small residual hematoma present which is definitely decreased in size  Pulmonary:      Effort: Pulmonary effort is normal. No respiratory distress. Breath sounds: Normal breath sounds. No wheezing or rales. Abdominal:      General: There is no distension. Tenderness: There is no abdominal tenderness. Lymphadenopathy:      Cervical: No cervical adenopathy. Skin:     General: Skin is warm and dry. ASSESSMENT:     Diagnosis Orders   1. Essential hypertension  EKG 12 lead       PLAN:  Orders Placed This Encounter   Procedures    EKG 12 lead     No orders of the defined types were placed in this encounter. 1. Continue present medications  2. Recommend follow-up assessment in 1 month    Return in about 4 weeks (around 4/30/2020) for return to Dr. Jenae Cruz only. Karla Wade MD 4/2/2020 11:02 AM CDT    [facility]      Thisdictation was generated by voice recognition computer software. Although all attempts are made to edit the dictation for accuracy, there may be errors in the transcription that are not intended.

## 2020-04-02 NOTE — PROGRESS NOTES
ICD interrogated  Presenting rhythm: VS,  <0.1 %  Battery status: 11.2 years  Lead status: lead impedance within range and stable  Sensing:  R waves 6.0 mV  Thresholds:  Ventricular 0.750 V @ 0.4ms  Observations:  none  Next [facility] home check scheduled for 6/19/20

## 2020-05-04 ENCOUNTER — TELEPHONE (OUTPATIENT)
Dept: CARDIOLOGY | Age: 75
End: 2020-05-04

## 2020-05-06 ENCOUNTER — TELEPHONE (OUTPATIENT)
Dept: CARDIOLOGY | Age: 75
End: 2020-05-06

## 2020-05-07 ENCOUNTER — TELEPHONE (OUTPATIENT)
Dept: CARDIOLOGY | Age: 75
End: 2020-05-07

## 2020-05-11 ENCOUNTER — TELEPHONE (OUTPATIENT)
Dept: CARDIOLOGY | Age: 75
End: 2020-05-11

## 2020-06-15 ENCOUNTER — OFFICE VISIT (OUTPATIENT)
Dept: CARDIOLOGY | Age: 75
End: 2020-06-15
Payer: MEDICARE

## 2020-06-15 VITALS
DIASTOLIC BLOOD PRESSURE: 60 MMHG | BODY MASS INDEX: 30.22 KG/M2 | SYSTOLIC BLOOD PRESSURE: 110 MMHG | HEART RATE: 63 BPM | WEIGHT: 188 LBS | HEIGHT: 66 IN

## 2020-06-15 PROCEDURE — G8417 CALC BMI ABV UP PARAM F/U: HCPCS | Performed by: INTERNAL MEDICINE

## 2020-06-15 PROCEDURE — 99214 OFFICE O/P EST MOD 30 MIN: CPT | Performed by: INTERNAL MEDICINE

## 2020-06-15 PROCEDURE — G8427 DOCREV CUR MEDS BY ELIG CLIN: HCPCS | Performed by: INTERNAL MEDICINE

## 2020-06-15 PROCEDURE — 93289 INTERROG DEVICE EVAL HEART: CPT | Performed by: INTERNAL MEDICINE

## 2020-06-15 PROCEDURE — 3017F COLORECTAL CA SCREEN DOC REV: CPT | Performed by: INTERNAL MEDICINE

## 2020-06-15 PROCEDURE — 1036F TOBACCO NON-USER: CPT | Performed by: INTERNAL MEDICINE

## 2020-06-15 PROCEDURE — 4040F PNEUMOC VAC/ADMIN/RCVD: CPT | Performed by: INTERNAL MEDICINE

## 2020-06-15 PROCEDURE — 1123F ACP DISCUSS/DSCN MKR DOCD: CPT | Performed by: INTERNAL MEDICINE

## 2020-06-15 ASSESSMENT — ENCOUNTER SYMPTOMS
RESPIRATORY NEGATIVE: 1
DIARRHEA: 0
SHORTNESS OF BREATH: 0
EYES NEGATIVE: 1
NAUSEA: 0
GASTROINTESTINAL NEGATIVE: 1
VOMITING: 0

## 2020-06-15 NOTE — PROGRESS NOTES
[facility] Progress Note                            Date:  6/15/2020  Patient: Arabella Edwards  Age:  76 y.o., 1945      Reason for evaluation:         SUBJECTIVE:    Returns today for follow-up assessment. Overall feeling well. Denies ICD shocks. Device interrogated functioning appropriately. Denies dyspnea or chest pain no other complaints. Blood pressure 110/60 heart rate 63. No recent echocardiogram last 3/15/2017 mild MR RVSP 42 ejection fraction 30%. Review of Systems   Constitutional: Negative. Negative for chills, fever and unexpected weight change. HENT: Negative. Eyes: Negative. Respiratory: Negative. Negative for shortness of breath. Cardiovascular: Negative. Negative for chest pain. Gastrointestinal: Negative. Negative for diarrhea, nausea and vomiting. Endocrine: Negative. Genitourinary: Negative. Musculoskeletal: Negative. Skin: Negative. Neurological: Negative. All other systems reviewed and are negative. OBJECTIVE:     /60   Pulse 63   Ht 5' 6\" (1.676 m)   Wt 188 lb (85.3 kg)   BMI 30.34 kg/m²     Labs:   CBC: No results for input(s): WBC, HGB, HCT, PLT in the last 72 hours. BMP:No results for input(s): NA, K, CO2, BUN, CREATININE, LABGLOM, GLUCOSE in the last 72 hours. BNP: No results for input(s): BNP in the last 72 hours. PT/INR: No results for input(s): PROTIME, INR in the last 72 hours. APTT:No results for input(s): APTT in the last 72 hours. CARDIAC ENZYMES:No results for input(s): CKTOTAL, CKMB, CKMBINDEX, TROPONINI in the last 72 hours. FASTING LIPID PANEL:  Lab Results   Component Value Date    HDL 40 04/27/2014    LDLDIRECT 69 04/27/2014    TRIG 92 04/27/2014     LIVER PROFILE:No results for input(s): AST, ALT, LABALBU in the last 72 hours.         Past Medical History:   Diagnosis Date    Acute myocardial infarction of anterolateral wall, subsequent episode of care [facility]) 12/25/2011    AICD discharge Allergen Reactions    Ambien [Zolpidem]      Unknown      Lortab [Hydrocodone-Acetaminophen] Other (See Comments)     One tab daily caused bowel obstruction    Phenergan  [Promethazine Hcl] Rash     Current Outpatient Medications   Medication Sig Dispense Refill    blood glucose test strips (EXACTECH TEST) strip USE 1 STRIP FOR BLOOD TEST TWICE A DAY ALTERNATING TIMES EACH DAY *STABLE INSULIN THERAPY*     DEC 30,2019      mupirocin (BACTROBAN) 2 % ointment mupirocin 2 % topical ointment   APPLY A SMALL AMOUNT TO THE AFFECTED AREA BY TOPICAL ROUTE 3 TIMES PER DAY      sotalol (BETAPACE) 80 MG tablet Take 80 mg by mouth 2 times daily       budesonide-formoterol (SYMBICORT) 160-4.5 MCG/ACT AERO Inhale 2 puffs into the lungs 2 times daily as needed       bumetanide (BUMEX) 2 MG tablet Take 2 mg by mouth Twice a Week Indications: Saturday and Tuesday       escitalopram (LEXAPRO) 10 MG tablet Take 10 mg by mouth daily      nitroGLYCERIN (NITROSTAT) 0.4 MG SL tablet Place 1 tablet under the tongue every 5 minutes as needed for Chest pain 25 tablet 3    lisinopril (PRINIVIL;ZESTRIL) 20 MG tablet Take 10 mg by mouth daily       metFORMIN (GLUCOPHAGE) 500 MG tablet Take 250 mg by mouth 2 times daily (with meals)       PROAIR  (90 Base) MCG/ACT inhaler Inhale 2 puffs into the lungs as needed for Shortness of Breath   1    furosemide (LASIX) 40 MG tablet Take 40 mg by mouth daily   2    potassium chloride (KLOR-CON M) 20 MEQ extended release tablet Take 10 mEq by mouth daily   2    pantoprazole (PROTONIX) 40 MG tablet Take 40 mg by mouth daily.  mirtazapine (REMERON SOL-TAB) 30 MG disintegrating tablet Take 30 mg by mouth nightly       simvastatin (ZOCOR) 80 MG tablet Take 40 mg by mouth nightly        No current facility-administered medications for this visit.       Social History     Socioeconomic History    Marital status:      Spouse name: Not on file    Number of children: Not on

## 2020-06-15 NOTE — PROGRESS NOTES
ICD interrogated  Presenting rhythm: VS,  <0.1 %  Battery status: 11.2 years  Lead status: lead impedance within range and stable  Sensing:  R waves 6.5 mV  Thresholds:  Ventricular 0.750 V @ 0.4ms  Observations:  None  Reprogramming for sensitivity and threshold testing  Next [facility] home check scheduled for 09/16/20

## 2020-06-22 ENCOUNTER — HOSPITAL ENCOUNTER (OUTPATIENT)
Dept: NON INVASIVE DIAGNOSTICS | Age: 75
Discharge: HOME | End: 2020-06-22
Payer: MEDICARE

## 2020-06-22 LAB
LV EF: 38 %
LVEF MODALITY: NORMAL

## 2020-06-22 PROCEDURE — C8929 TTE W OR WO FOL WCON,DOPPLER: HCPCS

## 2020-06-22 PROCEDURE — 6360000004 HC RX CONTRAST MEDICATION: Performed by: INTERNAL MEDICINE

## 2020-06-22 RX ADMIN — PERFLUTREN 1.65 MG: 6.52 INJECTION, SUSPENSION INTRAVENOUS at 10:40

## 2020-06-30 ENCOUNTER — TELEPHONE (OUTPATIENT)
Dept: CARDIOLOGY | Age: 75
End: 2020-06-30

## 2020-09-21 PROCEDURE — 93296 REM INTERROG EVL PM/IDS: CPT | Performed by: INTERNAL MEDICINE

## 2020-09-21 PROCEDURE — 93295 DEV INTERROG REMOTE 1/2/MLT: CPT | Performed by: INTERNAL MEDICINE

## 2020-10-21 ENCOUNTER — HOSPITAL ENCOUNTER (OUTPATIENT)
Dept: CT IMAGING | Age: 75
Discharge: HOME | End: 2020-10-21
Payer: MEDICARE

## 2020-10-21 LAB
GFR AFRICAN AMERICAN: >60
GFR NON-AFRICAN AMERICAN: 59
PERFORMED ON: ABNORMAL
POC CREATININE: 1.2 MG/DL (ref 0.3–1.3)
POC SAMPLE TYPE: ABNORMAL

## 2020-10-21 PROCEDURE — 82565 ASSAY OF CREATININE: CPT

## 2020-10-21 PROCEDURE — 74160 CT ABDOMEN W/CONTRAST: CPT

## 2020-10-21 PROCEDURE — 6360000004 HC RX CONTRAST MEDICATION: Performed by: NURSE PRACTITIONER

## 2020-10-21 RX ADMIN — IOPAMIDOL 90 ML: 755 INJECTION, SOLUTION INTRAVENOUS at 11:14

## 2020-12-31 ENCOUNTER — OFFICE VISIT (OUTPATIENT)
Dept: CARDIOLOGY | Age: 75
End: 2020-12-31
Payer: MEDICARE

## 2020-12-31 VITALS
OXYGEN SATURATION: 94 % | HEART RATE: 64 BPM | BODY MASS INDEX: 29.89 KG/M2 | WEIGHT: 186 LBS | DIASTOLIC BLOOD PRESSURE: 66 MMHG | SYSTOLIC BLOOD PRESSURE: 112 MMHG | HEIGHT: 66 IN

## 2020-12-31 PROCEDURE — 99213 OFFICE O/P EST LOW 20 MIN: CPT | Performed by: INTERNAL MEDICINE

## 2020-12-31 PROCEDURE — G8417 CALC BMI ABV UP PARAM F/U: HCPCS | Performed by: INTERNAL MEDICINE

## 2020-12-31 PROCEDURE — 1036F TOBACCO NON-USER: CPT | Performed by: INTERNAL MEDICINE

## 2020-12-31 PROCEDURE — 93282 PRGRMG EVAL IMPLANTABLE DFB: CPT | Performed by: INTERNAL MEDICINE

## 2020-12-31 PROCEDURE — 4040F PNEUMOC VAC/ADMIN/RCVD: CPT | Performed by: INTERNAL MEDICINE

## 2020-12-31 PROCEDURE — 3017F COLORECTAL CA SCREEN DOC REV: CPT | Performed by: INTERNAL MEDICINE

## 2020-12-31 PROCEDURE — G8427 DOCREV CUR MEDS BY ELIG CLIN: HCPCS | Performed by: INTERNAL MEDICINE

## 2020-12-31 PROCEDURE — G8484 FLU IMMUNIZE NO ADMIN: HCPCS | Performed by: INTERNAL MEDICINE

## 2020-12-31 PROCEDURE — 1123F ACP DISCUSS/DSCN MKR DOCD: CPT | Performed by: INTERNAL MEDICINE

## 2020-12-31 NOTE — PROGRESS NOTES
ICD interrogated  Presenting rhythm: ,  <0.1 %  Battery status: 10.9 years  Lead status: lead impedance within range and stable  Sensing:  R waves 6.4 mV  Thresholds:  Ventricular 1.000 V @ 0.4ms  Observations:  None  Reprogramming for sensitivity and threshold testing  Next [facility] home check scheduled for 04/02/21

## 2020-12-31 NOTE — PROGRESS NOTES
[facility] Progress Note                            Date:  12/31/2020  Patient: Cary Cuello  Age:  76 y.o., 1945      Reason for evaluation:         SUBJECTIVE:    Returns today follow-up assessment device interrogation satisfactory no device therapy overall feels well. Denies chest pain or dyspnea denies palpitations. Last echocardiogram 6/22/2020 ejection fraction 35 to 40% biatrial enlargement mild MR moderate TR RVSP 53. Review of Systems   Constitutional: Negative. Negative for chills, fever and unexpected weight change. HENT: Negative. Eyes: Negative. Respiratory: Negative. Negative for shortness of breath. Cardiovascular: Negative. Negative for chest pain. Gastrointestinal: Negative. Negative for diarrhea, nausea and vomiting. Endocrine: Negative. Genitourinary: Negative. Musculoskeletal: Negative. Skin: Negative. Neurological: Negative. All other systems reviewed and are negative. OBJECTIVE:     /66   Pulse 64   Ht 5' 6\" (1.676 m)   Wt 186 lb (84.4 kg)   SpO2 94%   BMI 30.02 kg/m²     Labs:   CBC: No results for input(s): WBC, HGB, HCT, PLT in the last 72 hours. BMP:No results for input(s): NA, K, CO2, BUN, CREATININE, LABGLOM, GLUCOSE in the last 72 hours. BNP: No results for input(s): BNP in the last 72 hours. PT/INR: No results for input(s): PROTIME, INR in the last 72 hours. APTT:No results for input(s): APTT in the last 72 hours. CARDIAC ENZYMES:No results for input(s): CKTOTAL, CKMB, CKMBINDEX, TROPONINI in the last 72 hours. FASTING LIPID PANEL:  Lab Results   Component Value Date    HDL 40 04/27/2014    LDLDIRECT 69 04/27/2014    TRIG 92 04/27/2014     LIVER PROFILE:No results for input(s): AST, ALT, LABALBU in the last 72 hours.         Past Medical History:   Diagnosis Date    Acute myocardial infarction of anterolateral wall, subsequent episode of care [facility]) 12/25/2011    AICD discharge     Allergies     Arthritis     CAD (coronary artery disease) of artery bypass graft     Cardiomyopathy, ischemic 5/25/2011    Coronary atherosclerosis of native coronary artery     Hyperlipidemia     Hyperlipidemia     Cholesterol management per Kimberley Orozco M.D.    Hypertension     ICD (implantable cardiac defibrillator) 5/25/2011    Rapid atrial fibrillation (HCC)     Shortness of breath     Type II or unspecified type diabetes mellitus without mention of complication, not stated as uncontrolled      Past Surgical History:   Procedure Laterality Date    APPENDECTOMY      ARM SURGERY Left 10/14/2019    EXCISION LIPOMA LEFT ARM performed by [facility] Hatch MD [facility]  4/29/14  [facility]    2 vessel CAD.  EF 45%    CARDIAC PACEMAKER PLACEMENT      medtronic     CHOLECYSTECTOMY, LAPAROSCOPIC N/A 9/26/2016    CHOLECYSTECTOMY LAPAROSCOPIC performed by Arelis Prasad MD at [address] [place] [address] GRAFT  01/03/2011    2V CABG (LIMA - LAD, SVG - PLM)    DIAGNOSTIC CARDIAC CATH LAB PROCEDURE  12/25/10    left heart cath, selective coronary arteriography, left ventriculography, direct infarct stenting to the left anterior descending coronary artery, left iliac arteriography, right iliac arteriography via left common femoral artery     FINGER AMPUTATION      traumatic left index finger    HERNIA REPAIR      ventral    MIDDLE EAR SURGERY Right 1966    tumor removed, some hearing loss     PACEMAKER PLACEMENT      MEDTRONIC PACEMAKER/DEFIB    DC COLONOSCOPY W/BIOPSY SINGLE/MULTIPLE N/A 4/3/2017    Dr JOSE Herrera-diverticular disease, benign ulcerated inflammatory polyp demonstrating a granulation tissue response--1 yr recall    DC EGD TRANSORAL BIOPSY SINGLE/MULTIPLE N/A 4/3/2017    Dr Ace Ruth hiatal hernia, gastritis, chronic inflammation     Family History   Adopted: Yes   Problem Relation Age of Onset    Diabetes Other     Heart Disease Other      Allergies   Allergen Reactions  Ambien [Zolpidem]      Unknown      Lortab [Hydrocodone-Acetaminophen] Other (See Comments)     One tab daily caused bowel obstruction    Phenergan  [Promethazine Hcl] Rash     Current Outpatient Medications   Medication Sig Dispense Refill    blood glucose test strips (EXACTECH TEST) strip USE 1 STRIP FOR BLOOD TEST TWICE A DAY ALTERNATING TIMES EACH DAY *STABLE INSULIN THERAPY*     DEC 30,2019      mupirocin (BACTROBAN) 2 % ointment mupirocin 2 % topical ointment   APPLY A SMALL AMOUNT TO THE AFFECTED AREA BY TOPICAL ROUTE 3 TIMES PER DAY      sotalol (BETAPACE) 80 MG tablet Take 80 mg by mouth 2 times daily       budesonide-formoterol (SYMBICORT) 160-4.5 MCG/ACT AERO Inhale 2 puffs into the lungs 2 times daily as needed       bumetanide (BUMEX) 2 MG tablet Take 2 mg by mouth Twice a Week Indications: Saturday and Tuesday       escitalopram (LEXAPRO) 10 MG tablet Take 10 mg by mouth daily      nitroGLYCERIN (NITROSTAT) 0.4 MG SL tablet Place 1 tablet under the tongue every 5 minutes as needed for Chest pain 25 tablet 3    lisinopril (PRINIVIL;ZESTRIL) 20 MG tablet Take 10 mg by mouth daily       metFORMIN (GLUCOPHAGE) 500 MG tablet Take 250 mg by mouth 2 times daily (with meals)       PROAIR  (90 Base) MCG/ACT inhaler Inhale 2 puffs into the lungs as needed for Shortness of Breath   1    furosemide (LASIX) 40 MG tablet Take 40 mg by mouth daily   2    potassium chloride (KLOR-CON M) 20 MEQ extended release tablet Take 10 mEq by mouth daily   2    simvastatin (ZOCOR) 80 MG tablet Take 40 mg by mouth nightly       pantoprazole (PROTONIX) 40 MG tablet Take 40 mg by mouth daily.  mirtazapine (REMERON SOL-TAB) 30 MG disintegrating tablet Take 30 mg by mouth nightly        No current facility-administered medications for this visit.       Social History     Socioeconomic History    Marital status:      Spouse name: Not on file    Number of children: Not on file    Years of education: Not on file    Highest education level: Not on file   Occupational History    Not on file   Social Needs    Financial resource strain: Not on file    Food insecurity     Worry: Not on file     Inability: Not on file    Transportation needs     Medical: Not on file     Non-medical: Not on file   Tobacco Use    Smoking status: Never Smoker    Smokeless tobacco: Former User     Types: Chew   Substance and Sexual Activity    Alcohol use: No     Alcohol/week: 0.0 standard drinks    Drug use: No    Sexual activity: Not on file   Lifestyle    Physical activity     Days per week: Not on file     Minutes per session: Not on file    Stress: Not on file   Relationships    Social connections     Talks on phone: Not on file     Gets together: Not on file     Attends Roman Catholic service: Not on file     Active member of club or organization: Not on file     Attends meetings of clubs or organizations: Not on file     Relationship status: Not on file    Intimate partner violence     Fear of current or ex partner: Not on file     Emotionally abused: Not on file     Physically abused: Not on file     Forced sexual activity: Not on file   Other Topics Concern    Not on file   Social History Narrative    Not on file       Physical Examination:  /66   Pulse 64   Ht 5' 6\" (1.676 m)   Wt 186 lb (84.4 kg)   SpO2 94%   BMI 30.02 kg/m²   Physical Exam  Vitals signs reviewed. Constitutional:       Appearance: He is well-developed. Neck:      Vascular: No carotid bruit or JVD. Cardiovascular:      Rate and Rhythm: Normal rate and regular rhythm. Heart sounds: Normal heart sounds. No murmur. No friction rub. No gallop. Pulmonary:      Effort: Pulmonary effort is normal. No respiratory distress. Breath sounds: Normal breath sounds. No wheezing or rales. Abdominal:      General: There is no distension. Tenderness: There is no abdominal tenderness.    Lymphadenopathy:      Cervical: No

## 2021-01-21 ENCOUNTER — TELEPHONE (OUTPATIENT)
Dept: CARDIOLOGY CLINIC | Age: 76
End: 2021-01-21

## 2021-01-21 NOTE — TELEPHONE ENCOUNTER
[employer] is not getting communication with patient's home ICD monitor. I asked him to check the connection.

## 2021-02-01 ENCOUNTER — OFFICE VISIT (OUTPATIENT)
Age: 76
End: 2021-02-01

## 2021-02-01 VITALS — OXYGEN SATURATION: 94 % | HEART RATE: 76 BPM

## 2021-02-01 DIAGNOSIS — Z11.59 SCREENING FOR VIRAL DISEASE: Primary | ICD-10-CM

## 2021-02-01 PROCEDURE — 99999 PR OFFICE/OUTPT VISIT,PROCEDURE ONLY: CPT | Performed by: NURSE PRACTITIONER

## 2021-02-02 LAB — SARS-COV-2, NAA: DETECTED

## 2021-04-01 ENCOUNTER — OFFICE VISIT (OUTPATIENT)
Dept: CARDIOLOGY CLINIC | Age: 76
End: 2021-04-01
Payer: MEDICARE

## 2021-04-01 VITALS
HEIGHT: 66 IN | WEIGHT: 192 LBS | HEART RATE: 67 BPM | BODY MASS INDEX: 30.86 KG/M2 | SYSTOLIC BLOOD PRESSURE: 124 MMHG | DIASTOLIC BLOOD PRESSURE: 84 MMHG

## 2021-04-01 DIAGNOSIS — Z45.02 IMPLANTABLE CARDIOVERTER-DEFIBRILLATOR (ICD) GENERATOR END OF LIFE: ICD-10-CM

## 2021-04-01 DIAGNOSIS — I25.5 ISCHEMIC CARDIOMYOPATHY: ICD-10-CM

## 2021-04-01 DIAGNOSIS — E78.2 MIXED HYPERLIPIDEMIA: ICD-10-CM

## 2021-04-01 DIAGNOSIS — K59.09 CHRONIC CONSTIPATION: ICD-10-CM

## 2021-04-01 DIAGNOSIS — I50.22 CHRONIC SYSTOLIC (CONGESTIVE) HEART FAILURE (HCC): Primary | ICD-10-CM

## 2021-04-01 DIAGNOSIS — Z95.1 S/P CABG (CORONARY ARTERY BYPASS GRAFT): ICD-10-CM

## 2021-04-01 DIAGNOSIS — I10 ESSENTIAL HYPERTENSION: ICD-10-CM

## 2021-04-01 PROCEDURE — 1036F TOBACCO NON-USER: CPT | Performed by: INTERNAL MEDICINE

## 2021-04-01 PROCEDURE — 4040F PNEUMOC VAC/ADMIN/RCVD: CPT | Performed by: INTERNAL MEDICINE

## 2021-04-01 PROCEDURE — G8417 CALC BMI ABV UP PARAM F/U: HCPCS | Performed by: INTERNAL MEDICINE

## 2021-04-01 PROCEDURE — 1123F ACP DISCUSS/DSCN MKR DOCD: CPT | Performed by: INTERNAL MEDICINE

## 2021-04-01 PROCEDURE — 93282 PRGRMG EVAL IMPLANTABLE DFB: CPT | Performed by: INTERNAL MEDICINE

## 2021-04-01 PROCEDURE — 99214 OFFICE O/P EST MOD 30 MIN: CPT | Performed by: INTERNAL MEDICINE

## 2021-04-01 PROCEDURE — G8427 DOCREV CUR MEDS BY ELIG CLIN: HCPCS | Performed by: INTERNAL MEDICINE

## 2021-04-01 PROCEDURE — 3017F COLORECTAL CA SCREEN DOC REV: CPT | Performed by: INTERNAL MEDICINE

## 2021-04-01 RX ORDER — NITROGLYCERIN 0.4 MG/1
0.4 TABLET SUBLINGUAL EVERY 5 MIN PRN
Qty: 25 TABLET | Refills: 3 | Status: SHIPPED | OUTPATIENT
Start: 2021-04-01

## 2021-04-01 ASSESSMENT — ENCOUNTER SYMPTOMS
RESPIRATORY NEGATIVE: 1
NAUSEA: 0
DIARRHEA: 0
GASTROINTESTINAL NEGATIVE: 1
VOMITING: 0
EYES NEGATIVE: 1
SHORTNESS OF BREATH: 0

## 2021-04-01 NOTE — PROGRESS NOTES
ICD interrogated  Presenting rhythm: VS,  <0.1 %  Battery status: 10.7 years  Lead status: lead impedance within range and stable  Sensing:  R waves 6.9 mV  Thresholds:  Ventricular 1.000 V @ 0.4ms  Observations:  None  Reprogramming for sensitivity and threshold testing  Next [facility] home check scheduled for 07/01/21

## 2021-04-01 NOTE — PROGRESS NOTES
 Allergies     Arthritis     CAD (coronary artery disease) of artery bypass graft     Cardiomyopathy, ischemic 5/25/2011    Coronary atherosclerosis of native coronary artery     Hyperlipidemia     Hyperlipidemia     Cholesterol management per Catrina Amaya M.D.    Hypertension     ICD (implantable cardiac defibrillator) 5/25/2011    Rapid atrial fibrillation (HCC)     Shortness of breath     Type II or unspecified type diabetes mellitus without mention of complication, not stated as uncontrolled      Past Surgical History:   Procedure Laterality Date    APPENDECTOMY      ARM SURGERY Left 10/14/2019    EXCISION LIPOMA LEFT ARM performed by Ally Ramirez MD at [address] [facility]  4/29/14  [facility]    2 vessel CAD.  EF 45%    CARDIAC PACEMAKER PLACEMENT      medtronic     CHOLECYSTECTOMY, LAPAROSCOPIC N/A 9/26/2016    CHOLECYSTECTOMY LAPAROSCOPIC performed by Darnell Lowery MD at [address] [facility] GRAFT  01/03/2011    2V CABG (LIMA - LAD, SVG - PLM)    DIAGNOSTIC CARDIAC CATH LAB PROCEDURE  12/25/10    left heart cath, selective coronary arteriography, left ventriculography, direct infarct stenting to the left anterior descending coronary artery, left iliac arteriography, right iliac arteriography via left common femoral artery     FINGER AMPUTATION      traumatic left index finger    HERNIA REPAIR      ventral    MIDDLE EAR SURGERY Right 1966    tumor removed, some hearing loss     PACEMAKER PLACEMENT      [employer] PACEMAKER/DEFIB    IA COLONOSCOPY W/BIOPSY SINGLE/MULTIPLE N/A 4/3/2017    Dr JOSE Herrera-diverticular disease, benign ulcerated inflammatory polyp demonstrating a granulation tissue response--1 yr recall    IA EGD TRANSORAL BIOPSY SINGLE/MULTIPLE N/A 4/3/2017    Dr Zaina Gates hiatal hernia, gastritis, chronic inflammation     Family History   Adopted: Yes   Problem Relation Age of Onset    Diabetes Other     Heart Disease Other      Allergies Allergen Reactions    Ambien [Zolpidem]      Unknown      Lortab [Hydrocodone-Acetaminophen] Other (See Comments)     One tab daily caused bowel obstruction    Phenergan  [Promethazine Hcl] Rash     Current Outpatient Medications   Medication Sig Dispense Refill    nitroGLYCERIN (NITROSTAT) 0.4 MG SL tablet Place 1 tablet under the tongue every 5 minutes as needed for Chest pain 25 tablet 3    blood glucose test strips (EXACTECH TEST) strip USE 1 STRIP FOR BLOOD TEST TWICE A DAY ALTERNATING TIMES EACH DAY *STABLE INSULIN THERAPY*     DEC 30,2019      mupirocin (BACTROBAN) 2 % ointment mupirocin 2 % topical ointment   APPLY A SMALL AMOUNT TO THE AFFECTED AREA BY TOPICAL ROUTE 3 TIMES PER DAY      sotalol (BETAPACE) 80 MG tablet Take 80 mg by mouth 2 times daily       budesonide-formoterol (SYMBICORT) 160-4.5 MCG/ACT AERO Inhale 2 puffs into the lungs 2 times daily as needed       bumetanide (BUMEX) 2 MG tablet Take 2 mg by mouth Twice a Week Indications: Saturday and Tuesday       escitalopram (LEXAPRO) 10 MG tablet Take 10 mg by mouth daily      lisinopril (PRINIVIL;ZESTRIL) 20 MG tablet Take 10 mg by mouth daily       metFORMIN (GLUCOPHAGE) 500 MG tablet Take 250 mg by mouth 2 times daily (with meals)       PROAIR  (90 Base) MCG/ACT inhaler Inhale 2 puffs into the lungs as needed for Shortness of Breath   1    furosemide (LASIX) 40 MG tablet Take 40 mg by mouth daily   2    potassium chloride (KLOR-CON M) 20 MEQ extended release tablet Take 10 mEq by mouth daily   2    simvastatin (ZOCOR) 80 MG tablet Take 40 mg by mouth nightly       pantoprazole (PROTONIX) 40 MG tablet Take 40 mg by mouth daily.  mirtazapine (REMERON SOL-TAB) 30 MG disintegrating tablet Take 30 mg by mouth nightly        No current facility-administered medications for this visit.       Social History     Socioeconomic History    Marital status:      Spouse name: Not on file    Number of children: Not on file    Years of education: Not on file    Highest education level: Not on file   Occupational History    Not on file   Social Needs    Financial resource strain: Not on file    Food insecurity     Worry: Not on file     Inability: Not on file    Transportation needs     Medical: Not on file     Non-medical: Not on file   Tobacco Use    Smoking status: Never Smoker    Smokeless tobacco: Former User     Types: Chew   Substance and Sexual Activity    Alcohol use: No     Alcohol/week: 0.0 standard drinks    Drug use: No    Sexual activity: Not on file   Lifestyle    Physical activity     Days per week: Not on file     Minutes per session: Not on file    Stress: Not on file   Relationships    Social connections     Talks on phone: Not on file     Gets together: Not on file     Attends Bahai service: Not on file     Active member of club or organization: Not on file     Attends meetings of clubs or organizations: Not on file     Relationship status: Not on file    Intimate partner violence     Fear of current or ex partner: Not on file     Emotionally abused: Not on file     Physically abused: Not on file     Forced sexual activity: Not on file   Other Topics Concern    Not on file   Social History Narrative    Not on file       Physical Examination:  /84   Pulse 67   Ht 5' 6\" (1.676 m)   Wt 192 lb (87.1 kg)   BMI 30.99 kg/m²   Physical Exam  Vitals signs reviewed. Constitutional:       Appearance: He is well-developed. Neck:      Vascular: No carotid bruit or JVD. Cardiovascular:      Rate and Rhythm: Normal rate and regular rhythm. Heart sounds: Normal heart sounds. No murmur. No friction rub. No gallop. Pulmonary:      Effort: Pulmonary effort is normal. No respiratory distress. Breath sounds: Normal breath sounds. No wheezing or rales. Abdominal:      General: There is no distension. Tenderness: There is no abdominal tenderness.    Lymphadenopathy: Cervical: No cervical adenopathy. Skin:     General: Skin is warm and dry. ASSESSMENT:     Diagnosis Orders   1. Chronic systolic (congestive) heart failure ([address]     2. Chronic constipation     3. Ischemic cardiomyopathy     4. Implantable cardioverter-defibrillator (ICD) generator end of life     5. Mixed hyperlipidemia     6. Essential hypertension     7. S/P CABG (coronary artery bypass graft)         PLAN:  No orders of the defined types were placed in this encounter. Orders Placed This Encounter   Medications    nitroGLYCERIN (NITROSTAT) 0.4 MG SL tablet     Sig: Place 1 tablet under the tongue every 5 minutes as needed for Chest pain     Dispense:  25 tablet     Refill:  3         1. Continue present medications  2. Recommend follow-up assessment in 3 months    Return in about 3 months (around 7/1/2021) for return to Dr. Veronika Mancera only. Stu Griffiths MD 4/1/2021 3:37 PM CDT    [facility]      Thisdictation was generated by voice recognition computer software. Although all attempts are made to edit the dictation for accuracy, there may be errors in the transcription that are not intended.

## 2021-05-06 NOTE — PROGRESS NOTES
Impression: NPDR, OU. Status: Fair Control. Moderate. Plan: DM since about 5. No DME. Blood sugar control. Patient swabbed for COVID-19 using [employer] vendor but was not evaluated inside the clinic. Patient specimen collected in drive through in patients private vehicle due to order present from primary care provider. Patient was not evaluated by flu clinic provider.

## 2021-07-01 ENCOUNTER — OFFICE VISIT (OUTPATIENT)
Dept: CARDIOLOGY CLINIC | Age: 76
End: 2021-07-01
Payer: MEDICARE

## 2021-07-01 VITALS
OXYGEN SATURATION: 92 % | DIASTOLIC BLOOD PRESSURE: 70 MMHG | SYSTOLIC BLOOD PRESSURE: 130 MMHG | WEIGHT: 192 LBS | BODY MASS INDEX: 30.86 KG/M2 | HEIGHT: 66 IN | HEART RATE: 65 BPM

## 2021-07-01 DIAGNOSIS — I48.0 PAF (PAROXYSMAL ATRIAL FIBRILLATION) (HCC): ICD-10-CM

## 2021-07-01 DIAGNOSIS — I25.5 ISCHEMIC CARDIOMYOPATHY: ICD-10-CM

## 2021-07-01 DIAGNOSIS — E78.5 HYPERLIPIDEMIA, UNSPECIFIED HYPERLIPIDEMIA TYPE: ICD-10-CM

## 2021-07-01 DIAGNOSIS — I10 ESSENTIAL HYPERTENSION: ICD-10-CM

## 2021-07-01 DIAGNOSIS — I25.10 CORONARY ARTERY DISEASE INVOLVING NATIVE CORONARY ARTERY OF NATIVE HEART WITHOUT ANGINA PECTORIS: Primary | ICD-10-CM

## 2021-07-01 PROCEDURE — G8428 CUR MEDS NOT DOCUMENT: HCPCS | Performed by: NURSE PRACTITIONER

## 2021-07-01 PROCEDURE — 93289 INTERROG DEVICE EVAL HEART: CPT | Performed by: NURSE PRACTITIONER

## 2021-07-01 PROCEDURE — G8417 CALC BMI ABV UP PARAM F/U: HCPCS | Performed by: NURSE PRACTITIONER

## 2021-07-01 PROCEDURE — 99214 OFFICE O/P EST MOD 30 MIN: CPT | Performed by: NURSE PRACTITIONER

## 2021-07-01 PROCEDURE — 1036F TOBACCO NON-USER: CPT | Performed by: NURSE PRACTITIONER

## 2021-07-01 PROCEDURE — 4040F PNEUMOC VAC/ADMIN/RCVD: CPT | Performed by: NURSE PRACTITIONER

## 2021-07-01 PROCEDURE — 3017F COLORECTAL CA SCREEN DOC REV: CPT | Performed by: NURSE PRACTITIONER

## 2021-07-01 PROCEDURE — 1123F ACP DISCUSS/DSCN MKR DOCD: CPT | Performed by: NURSE PRACTITIONER

## 2021-07-01 ASSESSMENT — ENCOUNTER SYMPTOMS
COUGH: 0
SORE THROAT: 0
CHEST TIGHTNESS: 0
WHEEZING: 0
SHORTNESS OF BREATH: 0

## 2021-07-01 NOTE — PROGRESS NOTES
tablet under the tongue every 5 minutes as needed for Chest pain 25 tablet 3    blood glucose test strips (EXACTECH TEST) strip USE 1 STRIP FOR BLOOD TEST TWICE A DAY ALTERNATING TIMES EACH DAY *STABLE INSULIN THERAPY*     DEC 30,2019      mupirocin (BACTROBAN) 2 % ointment mupirocin 2 % topical ointment   APPLY A SMALL AMOUNT TO THE AFFECTED AREA BY TOPICAL ROUTE 3 TIMES PER DAY      sotalol (BETAPACE) 80 MG tablet Take 80 mg by mouth 2 times daily       budesonide-formoterol (SYMBICORT) 160-4.5 MCG/ACT AERO Inhale 2 puffs into the lungs 2 times daily as needed       bumetanide (BUMEX) 2 MG tablet Take 2 mg by mouth Twice a Week Indications: Saturday and Tuesday       escitalopram (LEXAPRO) 10 MG tablet Take 10 mg by mouth daily      lisinopril (PRINIVIL;ZESTRIL) 20 MG tablet Take 10 mg by mouth daily       metFORMIN (GLUCOPHAGE) 500 MG tablet Take 500 mg by mouth 2 times daily (with meals)       PROAIR  (90 Base) MCG/ACT inhaler Inhale 2 puffs into the lungs as needed for Shortness of Breath   1    furosemide (LASIX) 40 MG tablet Take 40 mg by mouth daily   2    potassium chloride (KLOR-CON M) 20 MEQ extended release tablet Take 10 mEq by mouth daily   2    simvastatin (ZOCOR) 80 MG tablet Take 40 mg by mouth nightly       pantoprazole (PROTONIX) 40 MG tablet Take 40 mg by mouth daily.  mirtazapine (REMERON SOL-TAB) 30 MG disintegrating tablet Take 30 mg by mouth nightly        No current facility-administered medications for this visit.      Allergies: Ambien [zolpidem], Lortab [hydrocodone-acetaminophen], and Phenergan  [promethazine hcl]  Past Medical History:   Diagnosis Date    Acute myocardial infarction of anterolateral wall, subsequent episode of care ([facility] [address] 12/25/2011    AICD discharge     Allergies     Arthritis     CAD (coronary artery disease) of artery bypass graft     Cardiomyopathy, ischemic 5/25/2011    Coronary atherosclerosis of native coronary artery     Hyperlipidemia     Hyperlipidemia     Cholesterol management per Yolis Palma M.D.    Hypertension     ICD (implantable cardiac defibrillator) 5/25/2011    Rapid atrial fibrillation (HCC)     Shortness of breath     Type II or unspecified type diabetes mellitus without mention of complication, not stated as uncontrolled      Past Surgical History:   Procedure Laterality Date    APPENDECTOMY      ARM SURGERY Left 10/14/2019    EXCISION LIPOMA LEFT ARM performed by Juan Barlow MD at [address] [facility]  4/29/14  [facility]    2 vessel CAD.  EF 45%    CARDIAC PACEMAKER PLACEMENT      medtronic     CHOLECYSTECTOMY, LAPAROSCOPIC N/A 9/26/2016    CHOLECYSTECTOMY LAPAROSCOPIC performed by Jeanne Page MD at [address] [facility] GRAFT  01/03/2011    2V CABG (LIMA - LAD, SVG - PLM)    DIAGNOSTIC CARDIAC CATH LAB PROCEDURE  12/25/10    left heart cath, selective coronary arteriography, left ventriculography, direct infarct stenting to the left anterior descending coronary artery, left iliac arteriography, right iliac arteriography via left common femoral artery     FINGER AMPUTATION      traumatic left index finger    HERNIA REPAIR      ventral    MIDDLE EAR SURGERY Right 1966    tumor removed, some hearing loss     PACEMAKER PLACEMENT      [employer] PACEMAKER/DEFIB    TX COLONOSCOPY W/BIOPSY SINGLE/MULTIPLE N/A 4/3/2017    Dr JOSE Herrera-diverticular disease, benign ulcerated inflammatory polyp demonstrating a granulation tissue response--1 yr recall    TX EGD TRANSORAL BIOPSY SINGLE/MULTIPLE N/A 4/3/2017    Dr Annie Mcnally hiatal hernia, gastritis, chronic inflammation     Family History   Adopted: Yes   Problem Relation Age of Onset    Diabetes Other     Heart Disease Other      Social History     Tobacco Use    Smoking status: Never Smoker    Smokeless tobacco: Former User     Types: Chew   Substance Use Topics    Alcohol use: No     Alcohol/week: 0.0 standard drinks Review of Systems:    Review of Systems   Constitutional: Negative for activity change, chills, diaphoresis, fatigue and fever. HENT: Negative for congestion and sore throat. Respiratory: Negative for cough, chest tightness, shortness of breath and wheezing. Cardiovascular: Negative for chest pain, palpitations and leg swelling. Neurological: Negative for dizziness, syncope, weakness, light-headedness and headaches. Psychiatric/Behavioral: Negative for confusion. The patient is not nervous/anxious. Objective:    /70   Pulse 65   Ht 5' 6\" (1.676 m)   Wt 192 lb (87.1 kg)   SpO2 92%   BMI 30.99 kg/m²     GENERAL - well developed and well nourished, conversant  HEENT -  PERRLA, Hearing appears normal, gentleman lids are normal.  External inspection of ears and nose appear normal.  NECK - no thyromegaly, no JVD, trachea is in the midline  CARDIOVASCULAR - PMI is in the mid line clavicular position, Normal S1 and S2 with no systolic murmur. No S3 or S4    PULMONARY - no respiratory distress. No wheezes or rales. Lungs are clear to ausculation, normal respiratory effort. ABDOMEN  - soft, non tender, no rebound  MUSCULOSKELETAL  - range of motion of the upper and lower extermites appears normal and equal and is without pain   EXTREMITIES - no significant edema   NEUROLOGIC - gait and station are normal  SKIN - turgor is normal, can warm and dry. PSYCHIATRIC - normal mood and affect, alert and orientated x 3,      ASSESSMENT:    ALL THE CARDIOLOGY PROBLEMS ARE LISTED ABOVE; HOWEVER, THE FOLLOWING SPECIFIC CARDIAC PROBLEMS / CONDITIONS WERE ADDRESSED AND TREATED DURING THE OFFICE VISIT TODAY:                                                                                            MEDICAL DECISION MAKING             Cardiac Specific Problem / Diagnosis  Discussion and Data Reviewed Diagnostic Procedures Ordered Management Options Selected           1.  CAD  Stable   Review and summation of old records:    No chest pain. EKG in the office showing sinus rhythm with a heart rate of 65 bpm.  Patient is on Zocor Yes Continue current medications:     Yes           2. Paroxysmal atrial fib  Stable   EKG in the office showing sinus rhythm with right bundle branch block left axis bifascicular block. This is unchanged from previous EKG. .  Patient is on Betapace 80 mg twice daily. Yes Continue current medications:    Yes           3. Hypertension Stable  blood pressure in the office today 130/70. O2 sat 92%. Patient is on lisinopril 10 mg daily. No Continue current medications:       Yes           4. Chronic systolic congestive heart failure Stable  no overt signs of failure. Patient is on Bumex, Lasix, lisinopril. Interrogation in the office today shows battery status 10.6 years. Lead impedance stable. Thresholds stable. Mode VVI lower rate 40 bpm.  No shocks. V sensed 100%. [facility] 3 months. No Continue current medications:       Yes     Orders Placed This Encounter   Procedures    EKG 12 lead     Order Specific Question:   Reason for Exam?     Answer:   Hypertension     No orders of the defined types were placed in this encounter. Discussed with patient. Return in about 6 months (around 1/1/2022) for Dr Damien Guzman. I greatly appreciate the opportunity to meet Martina bAad and your confidence in allowing me to participate in his cardiovascular care. AMPARO Celestin NP  7/1/2021 10:22 AM CDT                    This dictation was generated by voice recognition computer software. Although all attempts are made to edit dictation for accuracy, there may be errors in the transcription that are not intended.

## 2021-07-29 ENCOUNTER — OFFICE VISIT (OUTPATIENT)
Dept: UROLOGY | Age: 76
End: 2021-07-29
Payer: MEDICARE

## 2021-07-29 VITALS — TEMPERATURE: 97.8 F | HEIGHT: 66 IN | WEIGHT: 196 LBS | BODY MASS INDEX: 31.5 KG/M2

## 2021-07-29 DIAGNOSIS — R97.20 ELEVATED PROSTATE SPECIFIC ANTIGEN (PSA): Primary | ICD-10-CM

## 2021-07-29 LAB
BILIRUBIN, POC: ABNORMAL
BLOOD URINE, POC: ABNORMAL
CLARITY, POC: CLEAR
COLOR, POC: YELLOW
GLUCOSE URINE, POC: ABNORMAL
KETONES, POC: ABNORMAL
LEUKOCYTE EST, POC: ABNORMAL
NITRITE, POC: ABNORMAL
PH, POC: 5
PROTEIN, POC: ABNORMAL
SPECIFIC GRAVITY, POC: 1.02
UROBILINOGEN, POC: 1

## 2021-07-29 PROCEDURE — 99204 OFFICE O/P NEW MOD 45 MIN: CPT | Performed by: UROLOGY

## 2021-07-29 PROCEDURE — G8427 DOCREV CUR MEDS BY ELIG CLIN: HCPCS | Performed by: UROLOGY

## 2021-07-29 PROCEDURE — 81003 URINALYSIS AUTO W/O SCOPE: CPT | Performed by: UROLOGY

## 2021-07-29 PROCEDURE — G8417 CALC BMI ABV UP PARAM F/U: HCPCS | Performed by: UROLOGY

## 2021-07-29 PROCEDURE — 1036F TOBACCO NON-USER: CPT | Performed by: UROLOGY

## 2021-07-29 PROCEDURE — 4040F PNEUMOC VAC/ADMIN/RCVD: CPT | Performed by: UROLOGY

## 2021-07-29 PROCEDURE — 1123F ACP DISCUSS/DSCN MKR DOCD: CPT | Performed by: UROLOGY

## 2021-07-29 RX ORDER — ASPIRIN 81 MG/1
81 TABLET ORAL DAILY
COMMUNITY

## 2021-07-29 ASSESSMENT — ENCOUNTER SYMPTOMS
WHEEZING: 0
EYE PAIN: 0
VOMITING: 0
BACK PAIN: 0
NAUSEA: 0
COUGH: 0
SORE THROAT: 0

## 2021-07-29 NOTE — PROGRESS NOTES
CARDIAC CATH LAB PROCEDURE  12/25/10    left heart cath, selective coronary arteriography, left ventriculography, direct infarct stenting to the left anterior descending coronary artery, left iliac arteriography, right iliac arteriography via left common femoral artery     FINGER AMPUTATION      traumatic left index finger    HERNIA REPAIR      ventral    MIDDLE EAR SURGERY Right 1966    tumor removed, some hearing loss     PACEMAKER PLACEMENT      [employer] PACEMAKER/DEFIB    LA COLONOSCOPY W/BIOPSY SINGLE/MULTIPLE N/A 4/3/2017    Dr JOSE Herrera-diverticular disease, benign ulcerated inflammatory polyp demonstrating a granulation tissue response--1 yr recall    LA EGD TRANSORAL BIOPSY SINGLE/MULTIPLE N/A 4/3/2017    Dr Christina Lay hiatal hernia, gastritis, chronic inflammation       Current Outpatient Medications   Medication Sig Dispense Refill    aspirin EC 81 MG EC tablet Take 81 mg by mouth daily      nitroGLYCERIN (NITROSTAT) 0.4 MG SL tablet Place 1 tablet under the tongue every 5 minutes as needed for Chest pain 25 tablet 3    blood glucose test strips (EXACTECH TEST) strip USE 1 STRIP FOR BLOOD TEST TWICE A DAY ALTERNATING TIMES EACH DAY *STABLE INSULIN THERAPY*     DEC 30,2019      mupirocin (BACTROBAN) 2 % ointment mupirocin 2 % topical ointment   APPLY A SMALL AMOUNT TO THE AFFECTED AREA BY TOPICAL ROUTE 3 TIMES PER DAY      sotalol (BETAPACE) 80 MG tablet Take 80 mg by mouth 2 times daily       budesonide-formoterol (SYMBICORT) 160-4.5 MCG/ACT AERO Inhale 2 puffs into the lungs 2 times daily as needed       bumetanide (BUMEX) 2 MG tablet Take 2 mg by mouth Twice a Week Indications: Saturday and Tuesday       escitalopram (LEXAPRO) 10 MG tablet Take 10 mg by mouth daily      lisinopril (PRINIVIL;ZESTRIL) 20 MG tablet Take 10 mg by mouth daily       metFORMIN (GLUCOPHAGE) 500 MG tablet Take 500 mg by mouth 2 times daily (with meals)       PROAIR  (90 Base) MCG/ACT inhaler Inhale 2 puffs into the lungs as needed for Shortness of Breath   1    furosemide (LASIX) 40 MG tablet Take 40 mg by mouth daily   2    potassium chloride (KLOR-CON M) 20 MEQ extended release tablet Take 10 mEq by mouth daily   2    simvastatin (ZOCOR) 80 MG tablet Take 40 mg by mouth nightly       pantoprazole (PROTONIX) 40 MG tablet Take 40 mg by mouth daily.  mirtazapine (REMERON SOL-TAB) 30 MG disintegrating tablet Take 30 mg by mouth nightly        No current facility-administered medications for this visit. Allergies   Allergen Reactions    Ambien [Zolpidem]      Unknown      Lortab [Hydrocodone-Acetaminophen] Other (See Comments)     One tab daily caused bowel obstruction    Phenergan  [Promethazine Hcl] Rash       Social History     Socioeconomic History    Marital status:      Spouse name: None    Number of children: None    Years of education: None    Highest education level: None   Occupational History    None   Tobacco Use    Smoking status: Never Smoker    Smokeless tobacco: Former User     Types: Chew   Vaping Use    Vaping Use: Never used   Substance and Sexual Activity    Alcohol use: No     Alcohol/week: 0.0 standard drinks    Drug use: No    Sexual activity: None   Other Topics Concern    None   Social History Narrative    None     Social Determinants of Health     Financial Resource Strain:     Difficulty of Paying Living Expenses:    Food Insecurity:     Worried About Running Out of Food in the Last Year:     Ran Out of Food in the Last Year:    Transportation Needs:     Lack of Transportation (Medical):      Lack of Transportation (Non-Medical):    Physical Activity:     Days of Exercise per Week:     Minutes of Exercise per Session:    Stress:     Feeling of Stress :    Social Connections:     Frequency of Communication with Friends and Family:     Frequency of Social Gatherings with Friends and Family:     Attends Mu-ism Services:     Active Member of Clubs or Organizations:     Attends Club or Organization Meetings:     Marital Status:    Intimate Partner Violence:     Fear of Current or Ex-Partner:     Emotionally Abused:     Physically Abused:     Sexually Abused:        Family History   Adopted: Yes   Problem Relation Age of Onset    Diabetes Other     Heart Disease Other        REVIEW OF SYSTEMS:  Review of Systems   Constitutional: Negative for chills and fever. HENT: Negative for congestion and sore throat. Eyes: Negative for pain and visual disturbance. Respiratory: Negative for cough and wheezing. Cardiovascular: Negative for chest pain and palpitations. Gastrointestinal: Negative for nausea and vomiting. Endocrine: Negative for polyphagia and polyuria. Genitourinary: Negative for decreased urine volume, difficulty urinating, discharge, dysuria, enuresis, flank pain, frequency, genital sores, hematuria, penile pain, penile swelling, scrotal swelling, testicular pain and urgency. Musculoskeletal: Negative for back pain and neck pain. Skin: Negative for rash and wound. Allergic/Immunologic: Negative for environmental allergies and food allergies. Neurological: Negative for dizziness and headaches. Hematological: Negative for adenopathy. Does not bruise/bleed easily. Psychiatric/Behavioral: Negative for confusion and hallucinations. All other systems reviewed and are negative. PHYSICAL EXAM:  Temp 97.8 °F (36.6 °C) (Temporal)   Ht 5' 6\" (1.676 m)   Wt 196 lb (88.9 kg)   BMI 31.64 kg/m²   Physical Exam  Constitutional:       General: He is not in acute distress. Appearance: Normal appearance. He is well-developed. HENT:      Head: Normocephalic and atraumatic. Nose: Nose normal.   Eyes:      General: No scleral icterus. Conjunctiva/sclera: Conjunctivae normal.      Pupils: Pupils are equal, round, and reactive to light. Neck:      Trachea: No tracheal deviation.    Cardiovascular:      Rate and Rhythm: Normal rate and regular rhythm. Pulmonary:      Effort: Pulmonary effort is normal. No respiratory distress. Breath sounds: No stridor. Abdominal:      General: There is no distension. Palpations: Abdomen is soft. There is no mass. Tenderness: There is no abdominal tenderness. Genitourinary:     Prostate: Enlarged. No nodules present. Rectum: Abnormal anal tone (Increased narrowed anus). Comments: Limited exam I could only feel the apex because of patient's body habitus and cooperation  Musculoskeletal:         General: No tenderness. Normal range of motion. Cervical back: Normal range of motion and neck supple. Lymphadenopathy:      Cervical: No cervical adenopathy. Skin:     General: Skin is warm and dry. Findings: No erythema. Neurological:      Mental Status: He is alert and oriented to person, place, and time. Psychiatric:         Behavior: Behavior normal.         Judgment: Judgment normal.             DATA:  Outside labs reviewed UA was negative. PSA repeat from 5/11/2021 was 7.2 creatinine 1.1  Results for orders placed or performed in visit on 07/29/21   POCT Urinalysis No Micro (Auto)   Result Value Ref Range    Color, UA yellow     Clarity, UA clear     Glucose, UA POC neg     Bilirubin, UA neg     Ketones, UA neg     Spec Grav, UA 1.025     Blood, UA POC neg     pH, UA 5.0     Protein, UA POC 100mg/dl (A)     Urobilinogen, UA 1.0     Leukocytes, UA neg     Nitrite, UA neg      PSA done 5/11/2021 was 7.2  PSA done 4/7/2021 was 6.0  PSA done 9/27/2019 was 4.9      1. Elevated prostate specific antigen (PSA)  We will repeat PSA since he had had this done in 2 months to confirm this is indeed persistently elevated he return in 2 weeks if this remains above 4 he will need a biopsy  - POCT Urinalysis No Micro (Auto)  - PSA, Diagnostic;  Future      Orders Placed This Encounter   Procedures    PSA, Diagnostic     PSA prior to next visit in 2 weeks Standing Status:   Future     Standing Expiration Date:   7/29/2022    POCT Urinalysis No Micro (Auto)        Return in about 2 weeks (around 8/12/2021) for PSA prior to vext visit. All information inputted into the note by the MA to include chief complaint, past medical history, past surgical history, medications, allergies, social and family history and review of systems has been reviewed and updated as needed by me. EMR Dragon/transcription disclaimer: Much of this documentt is electronic  transcription/translation of spoken language to printed text. The  electronic translation of spoken language may be erroneous, or at times,  nonsensical words or phrases may be inadvertently transcribed.  Although I  have reviewed the document for such errors, some may still exist.

## 2021-08-04 ENCOUNTER — HOSPITAL ENCOUNTER (INPATIENT)
Age: 76
LOS: 1 days | Discharge: HOME | DRG: 291 | End: 2021-08-05
Attending: EMERGENCY MEDICINE | Admitting: FAMILY MEDICINE
Payer: MEDICARE

## 2021-08-04 ENCOUNTER — APPOINTMENT (OUTPATIENT)
Dept: GENERAL RADIOLOGY | Age: 76
DRG: 291 | End: 2021-08-04
Payer: MEDICARE

## 2021-08-04 DIAGNOSIS — I25.5 ISCHEMIC CARDIOMYOPATHY: ICD-10-CM

## 2021-08-04 DIAGNOSIS — I50.43 ACUTE ON CHRONIC COMBINED SYSTOLIC AND DIASTOLIC CHF (CONGESTIVE HEART FAILURE) (HCC): ICD-10-CM

## 2021-08-04 DIAGNOSIS — J96.01 ACUTE RESPIRATORY FAILURE WITH HYPOXIA (HCC): Primary | ICD-10-CM

## 2021-08-04 DIAGNOSIS — R60.0 BILATERAL LOWER EXTREMITY EDEMA: ICD-10-CM

## 2021-08-04 PROBLEM — I27.20 SEVERE PULMONARY HYPERTENSION (HCC): Status: ACTIVE | Noted: 2021-08-04

## 2021-08-04 LAB
ADENOVIRUS BY PCR: NOT DETECTED
ALBUMIN SERPL-MCNC: 4.2 G/DL (ref 3.5–5.2)
ALP BLD-CCNC: 218 U/L (ref 40–130)
ALT SERPL-CCNC: 50 U/L (ref 5–41)
ANION GAP SERPL CALCULATED.3IONS-SCNC: 13 MMOL/L (ref 7–19)
AST SERPL-CCNC: 55 U/L (ref 5–40)
BASOPHILS ABSOLUTE: 0.1 K/UL (ref 0–0.2)
BASOPHILS RELATIVE PERCENT: 1 % (ref 0–1)
BILIRUB SERPL-MCNC: 1.3 MG/DL (ref 0.2–1.2)
BORDETELLA PARAPERTUSSIS BY PCR: NOT DETECTED
BORDETELLA PERTUSSIS BY PCR: NOT DETECTED
BUN BLDV-MCNC: 28 MG/DL (ref 8–23)
CALCIUM SERPL-MCNC: 9.9 MG/DL (ref 8.8–10.2)
CHLAMYDOPHILIA PNEUMONIAE BY PCR: NOT DETECTED
CHLORIDE BLD-SCNC: 101 MMOL/L (ref 98–111)
CO2: 26 MMOL/L (ref 22–29)
CORONAVIRUS 229E BY PCR: NOT DETECTED
CORONAVIRUS HKU1 BY PCR: NOT DETECTED
CORONAVIRUS NL63 BY PCR: NOT DETECTED
CORONAVIRUS OC43 BY PCR: NOT DETECTED
CREAT SERPL-MCNC: 1.2 MG/DL (ref 0.5–1.2)
EOSINOPHILS ABSOLUTE: 0.1 K/UL (ref 0–0.6)
EOSINOPHILS RELATIVE PERCENT: 1.2 % (ref 0–5)
GFR AFRICAN AMERICAN: >59
GFR NON-AFRICAN AMERICAN: 59
GLUCOSE BLD-MCNC: 166 MG/DL (ref 74–109)
GLUCOSE BLD-MCNC: 180 MG/DL (ref 70–99)
HCT VFR BLD CALC: 53.2 % (ref 42–52)
HEMOGLOBIN: 16.9 G/DL (ref 14–18)
HUMAN METAPNEUMOVIRUS BY PCR: NOT DETECTED
HUMAN RHINOVIRUS/ENTEROVIRUS BY PCR: NOT DETECTED
IMMATURE GRANULOCYTES #: 0 K/UL
INFLUENZA A BY PCR: NOT DETECTED
INFLUENZA B BY PCR: NOT DETECTED
LV EF: 35 %
LVEF MODALITY: NORMAL
LYMPHOCYTES ABSOLUTE: 1.7 K/UL (ref 1.1–4.5)
LYMPHOCYTES RELATIVE PERCENT: 21.7 % (ref 20–40)
MCH RBC QN AUTO: 29.2 PG (ref 27–31)
MCHC RBC AUTO-ENTMCNC: 31.8 G/DL (ref 33–37)
MCV RBC AUTO: 92 FL (ref 80–94)
MONOCYTES ABSOLUTE: 0.6 K/UL (ref 0–0.9)
MONOCYTES RELATIVE PERCENT: 7.2 % (ref 0–10)
MYCOPLASMA PNEUMONIAE BY PCR: NOT DETECTED
NEUTROPHILS ABSOLUTE: 5.3 K/UL (ref 1.5–7.5)
NEUTROPHILS RELATIVE PERCENT: 68.6 % (ref 50–65)
PARAINFLUENZA VIRUS 1 BY PCR: NOT DETECTED
PARAINFLUENZA VIRUS 2 BY PCR: NOT DETECTED
PARAINFLUENZA VIRUS 3 BY PCR: NOT DETECTED
PARAINFLUENZA VIRUS 4 BY PCR: NOT DETECTED
PDW BLD-RTO: 18.2 % (ref 11.5–14.5)
PERFORMED ON: ABNORMAL
PLATELET # BLD: 162 K/UL (ref 130–400)
PMV BLD AUTO: 10.3 FL (ref 9.4–12.4)
POTASSIUM REFLEX MAGNESIUM: 4.3 MMOL/L (ref 3.5–5)
PRO-BNP: 4603 PG/ML (ref 0–1800)
RBC # BLD: 5.78 M/UL (ref 4.7–6.1)
RESPIRATORY SYNCYTIAL VIRUS BY PCR: NOT DETECTED
SARS-COV-2, PCR: NOT DETECTED
SODIUM BLD-SCNC: 140 MMOL/L (ref 136–145)
TOTAL PROTEIN: 7.1 G/DL (ref 6.6–8.7)
TROPONIN: 0.03 NG/ML (ref 0–0.03)
WBC # BLD: 7.7 K/UL (ref 4.8–10.8)

## 2021-08-04 PROCEDURE — 0202U NFCT DS 22 TRGT SARS-COV-2: CPT

## 2021-08-04 PROCEDURE — 99283 EMERGENCY DEPT VISIT LOW MDM: CPT

## 2021-08-04 PROCEDURE — 6360000002 HC RX W HCPCS: Performed by: FAMILY MEDICINE

## 2021-08-04 PROCEDURE — 6370000000 HC RX 637 (ALT 250 FOR IP): Performed by: FAMILY MEDICINE

## 2021-08-04 PROCEDURE — 94640 AIRWAY INHALATION TREATMENT: CPT

## 2021-08-04 PROCEDURE — 96374 THER/PROPH/DIAG INJ IV PUSH: CPT

## 2021-08-04 PROCEDURE — C8929 TTE W OR WO FOL WCON,DOPPLER: HCPCS

## 2021-08-04 PROCEDURE — 2140000000 HC CCU INTERMEDIATE R&B

## 2021-08-04 PROCEDURE — 83880 ASSAY OF NATRIURETIC PEPTIDE: CPT

## 2021-08-04 PROCEDURE — 85025 COMPLETE CBC W/AUTO DIFF WBC: CPT

## 2021-08-04 PROCEDURE — 82947 ASSAY GLUCOSE BLOOD QUANT: CPT

## 2021-08-04 PROCEDURE — 80053 COMPREHEN METABOLIC PANEL: CPT

## 2021-08-04 PROCEDURE — 2580000003 HC RX 258: Performed by: FAMILY MEDICINE

## 2021-08-04 PROCEDURE — 2700000000 HC OXYGEN THERAPY PER DAY

## 2021-08-04 PROCEDURE — 84484 ASSAY OF TROPONIN QUANT: CPT

## 2021-08-04 PROCEDURE — 6360000002 HC RX W HCPCS: Performed by: EMERGENCY MEDICINE

## 2021-08-04 PROCEDURE — 36415 COLL VENOUS BLD VENIPUNCTURE: CPT

## 2021-08-04 PROCEDURE — 71045 X-RAY EXAM CHEST 1 VIEW: CPT

## 2021-08-04 RX ORDER — NICOTINE POLACRILEX 4 MG
15 LOZENGE BUCCAL PRN
Status: DISCONTINUED | OUTPATIENT
Start: 2021-08-04 | End: 2021-08-05 | Stop reason: HOSPADM

## 2021-08-04 RX ORDER — BUDESONIDE 0.5 MG/2ML
0.5 INHALANT ORAL 2 TIMES DAILY
Status: DISCONTINUED | OUTPATIENT
Start: 2021-08-04 | End: 2021-08-05 | Stop reason: HOSPADM

## 2021-08-04 RX ORDER — POLYETHYLENE GLYCOL 3350 17 G/17G
17 POWDER, FOR SOLUTION ORAL DAILY PRN
Status: DISCONTINUED | OUTPATIENT
Start: 2021-08-04 | End: 2021-08-05 | Stop reason: HOSPADM

## 2021-08-04 RX ORDER — ONDANSETRON 2 MG/ML
4 INJECTION INTRAMUSCULAR; INTRAVENOUS EVERY 6 HOURS PRN
Status: DISCONTINUED | OUTPATIENT
Start: 2021-08-04 | End: 2021-08-05 | Stop reason: HOSPADM

## 2021-08-04 RX ORDER — M-VIT,TX,IRON,MINS/CALC/FOLIC 27MG-0.4MG
1 TABLET ORAL DAILY
COMMUNITY

## 2021-08-04 RX ORDER — ONDANSETRON 4 MG/1
4 TABLET, ORALLY DISINTEGRATING ORAL EVERY 8 HOURS PRN
Status: DISCONTINUED | OUTPATIENT
Start: 2021-08-04 | End: 2021-08-05 | Stop reason: HOSPADM

## 2021-08-04 RX ORDER — FUROSEMIDE 10 MG/ML
80 INJECTION INTRAMUSCULAR; INTRAVENOUS ONCE
Status: COMPLETED | OUTPATIENT
Start: 2021-08-04 | End: 2021-08-04

## 2021-08-04 RX ORDER — POTASSIUM CHLORIDE 750 MG/1
10 TABLET, EXTENDED RELEASE ORAL DAILY
Status: DISCONTINUED | OUTPATIENT
Start: 2021-08-04 | End: 2021-08-05 | Stop reason: HOSPADM

## 2021-08-04 RX ORDER — ACETAMINOPHEN 650 MG/1
650 SUPPOSITORY RECTAL EVERY 6 HOURS PRN
Status: DISCONTINUED | OUTPATIENT
Start: 2021-08-04 | End: 2021-08-05 | Stop reason: HOSPADM

## 2021-08-04 RX ORDER — SODIUM CHLORIDE 0.9 % (FLUSH) 0.9 %
5-40 SYRINGE (ML) INJECTION EVERY 12 HOURS SCHEDULED
Status: DISCONTINUED | OUTPATIENT
Start: 2021-08-04 | End: 2021-08-05 | Stop reason: HOSPADM

## 2021-08-04 RX ORDER — BUDESONIDE AND FORMOTEROL FUMARATE DIHYDRATE 160; 4.5 UG/1; UG/1
2 AEROSOL RESPIRATORY (INHALATION) DAILY
Status: DISCONTINUED | OUTPATIENT
Start: 2021-08-04 | End: 2021-08-04

## 2021-08-04 RX ORDER — SOTALOL HYDROCHLORIDE 80 MG/1
80 TABLET ORAL 2 TIMES DAILY
Status: DISCONTINUED | OUTPATIENT
Start: 2021-08-04 | End: 2021-08-05 | Stop reason: HOSPADM

## 2021-08-04 RX ORDER — ASPIRIN 81 MG/1
81 TABLET ORAL DAILY
Status: DISCONTINUED | OUTPATIENT
Start: 2021-08-04 | End: 2021-08-05 | Stop reason: HOSPADM

## 2021-08-04 RX ORDER — MIRTAZAPINE 15 MG/1
30 TABLET, ORALLY DISINTEGRATING ORAL NIGHTLY
Status: DISCONTINUED | OUTPATIENT
Start: 2021-08-04 | End: 2021-08-05 | Stop reason: HOSPADM

## 2021-08-04 RX ORDER — PANTOPRAZOLE SODIUM 40 MG/1
40 TABLET, DELAYED RELEASE ORAL DAILY
Status: DISCONTINUED | OUTPATIENT
Start: 2021-08-04 | End: 2021-08-05 | Stop reason: HOSPADM

## 2021-08-04 RX ORDER — ATORVASTATIN CALCIUM 20 MG/1
20 TABLET, FILM COATED ORAL NIGHTLY
Status: DISCONTINUED | OUTPATIENT
Start: 2021-08-04 | End: 2021-08-05 | Stop reason: HOSPADM

## 2021-08-04 RX ORDER — ACETAMINOPHEN 325 MG/1
650 TABLET ORAL EVERY 6 HOURS PRN
COMMUNITY

## 2021-08-04 RX ORDER — SODIUM CHLORIDE 0.9 % (FLUSH) 0.9 %
5-40 SYRINGE (ML) INJECTION PRN
Status: DISCONTINUED | OUTPATIENT
Start: 2021-08-04 | End: 2021-08-05 | Stop reason: HOSPADM

## 2021-08-04 RX ORDER — ARFORMOTEROL TARTRATE 15 UG/2ML
15 SOLUTION RESPIRATORY (INHALATION) 2 TIMES DAILY
Status: DISCONTINUED | OUTPATIENT
Start: 2021-08-04 | End: 2021-08-05 | Stop reason: HOSPADM

## 2021-08-04 RX ORDER — ACETAMINOPHEN 325 MG/1
650 TABLET ORAL EVERY 6 HOURS PRN
Status: DISCONTINUED | OUTPATIENT
Start: 2021-08-04 | End: 2021-08-05 | Stop reason: HOSPADM

## 2021-08-04 RX ORDER — SODIUM CHLORIDE 9 MG/ML
25 INJECTION, SOLUTION INTRAVENOUS PRN
Status: DISCONTINUED | OUTPATIENT
Start: 2021-08-04 | End: 2021-08-05 | Stop reason: HOSPADM

## 2021-08-04 RX ORDER — LISINOPRIL 10 MG/1
10 TABLET ORAL DAILY
Status: DISCONTINUED | OUTPATIENT
Start: 2021-08-05 | End: 2021-08-05 | Stop reason: HOSPADM

## 2021-08-04 RX ORDER — FUROSEMIDE 10 MG/ML
40 INJECTION INTRAMUSCULAR; INTRAVENOUS 2 TIMES DAILY
Status: DISCONTINUED | OUTPATIENT
Start: 2021-08-04 | End: 2021-08-05 | Stop reason: HOSPADM

## 2021-08-04 RX ORDER — DEXTROSE MONOHYDRATE 25 G/50ML
12.5 INJECTION, SOLUTION INTRAVENOUS PRN
Status: DISCONTINUED | OUTPATIENT
Start: 2021-08-04 | End: 2021-08-05 | Stop reason: HOSPADM

## 2021-08-04 RX ORDER — ESCITALOPRAM OXALATE 10 MG/1
10 TABLET ORAL DAILY
Status: DISCONTINUED | OUTPATIENT
Start: 2021-08-04 | End: 2021-08-05 | Stop reason: HOSPADM

## 2021-08-04 RX ORDER — DEXTROSE MONOHYDRATE 50 MG/ML
100 INJECTION, SOLUTION INTRAVENOUS PRN
Status: DISCONTINUED | OUTPATIENT
Start: 2021-08-04 | End: 2021-08-05 | Stop reason: HOSPADM

## 2021-08-04 RX ADMIN — FUROSEMIDE 80 MG: 10 INJECTION, SOLUTION INTRAMUSCULAR; INTRAVENOUS at 13:05

## 2021-08-04 RX ADMIN — SODIUM CHLORIDE, PRESERVATIVE FREE 10 ML: 5 INJECTION INTRAVENOUS at 21:30

## 2021-08-04 RX ADMIN — ARFORMOTEROL TARTRATE 15 MCG: 15 SOLUTION RESPIRATORY (INHALATION) at 18:46

## 2021-08-04 RX ADMIN — ENOXAPARIN SODIUM 40 MG: 40 INJECTION SUBCUTANEOUS at 18:00

## 2021-08-04 RX ADMIN — BUDESONIDE 500 MCG: 0.5 SUSPENSION RESPIRATORY (INHALATION) at 18:46

## 2021-08-04 RX ADMIN — MIRTAZAPINE 30 MG: 15 TABLET, ORALLY DISINTEGRATING ORAL at 20:46

## 2021-08-04 RX ADMIN — ATORVASTATIN CALCIUM 20 MG: 20 TABLET, FILM COATED ORAL at 20:46

## 2021-08-04 RX ADMIN — SOTALOL HYDROCHLORIDE 80 MG: 80 TABLET ORAL at 20:46

## 2021-08-04 RX ADMIN — FUROSEMIDE 40 MG: 10 INJECTION, SOLUTION INTRAMUSCULAR; INTRAVENOUS at 18:00

## 2021-08-04 ASSESSMENT — ENCOUNTER SYMPTOMS
VOMITING: 0
VOICE CHANGE: 0
DIARRHEA: 0
NAUSEA: 0
SHORTNESS OF BREATH: 1
COLOR CHANGE: 0
RHINORRHEA: 0
COUGH: 0
EYE REDNESS: 0
ABDOMINAL PAIN: 0
SORE THROAT: 0
TROUBLE SWALLOWING: 0
EYE PAIN: 0
WHEEZING: 0
ABDOMINAL DISTENTION: 1

## 2021-08-04 ASSESSMENT — PAIN SCALES - GENERAL
PAINLEVEL_OUTOF10: 0
PAINLEVEL_OUTOF10: 0

## 2021-08-04 NOTE — ED NOTES
Pt put on 4 [address] [facility] 1[facility]  08/04/21 [id number]

## 2021-08-04 NOTE — H&P
HISTORY AND PHYSICAL             Date: 8/4/2021        Patient Name: Cary Cuello     YOB: 1945      Age:  68 y.o. Chief Complaint     Chief Complaint   Patient presents with    Shortness of Breath        History Obtained From   patient, spouse, electronic medical record, emergency room provider    History of Present Illness   Patient is a 42-year-old obese  male with a known past medical history of combined congestive heart failure, CAD, ICD in place, hypertension, hyperlipidemia, type 2 diabetes, accompanied by spouse and presented to the emergency room due to worsening shortness of breath over the past few days, increasing abdominal distention, as well as bilateral lower extremity swelling. Patient is noted difficulty with walking and worsening shortness of breath. Follows up with Dr. Osmin Sanchez cardiologist.  Currently utilizing Lasix as well as Bumex therapy as an outpatient. Patient is stated he was down to 183 pounds however slowly has noted his weight gain. Work-up in the emergency room reveals elevated proBNP, chest x-ray with no acute process. Patient received 80 mg of Lasix and has had urinary output since. Will be admitted to PCU for exacerbation of underlying combined congestive heart failure. At current time alert conversational, denies headache, change in vision, chest pain, nausea vomiting, fevers or chills.     Past Medical History     Past Medical History:   Diagnosis Date    Acute myocardial infarction of anterolateral wall, subsequent episode of care [facility]) 12/25/2011    AICD discharge     Allergies     Arthritis     CAD (coronary artery disease) of artery bypass graft     Cardiomyopathy, ischemic 5/25/2011    Coronary atherosclerosis of native coronary artery     Elevated PSA     Hyperlipidemia     Hyperlipidemia     Cholesterol management per Arlene Khan M.D.    Hypertension     ICD (implantable cardiac defibrillator) 5/25/2011    Rapid atrial fibrillation (HCC)     Shortness of breath     Type II or unspecified type diabetes mellitus without mention of complication, not stated as uncontrolled         Past Surgical History     Past Surgical History:   Procedure Laterality Date    APPENDECTOMY      ARM SURGERY Left 10/14/2019    EXCISION LIPOMA LEFT ARM performed by Joy De Leon MD at [address] [facility]  4/29/14  [facility]    2 vessel CAD. EF 45%    CARDIAC PACEMAKER PLACEMENT      medtronic     CHOLECYSTECTOMY, LAPAROSCOPIC N/A 9/26/2016    CHOLECYSTECTOMY LAPAROSCOPIC performed by Jhoan Cardenas MD at [address] [facility] GRAFT  01/03/2011    2V CABG (LIMA - LAD, SVG - PLM)    DIAGNOSTIC CARDIAC CATH LAB PROCEDURE  12/25/10    left heart cath, selective coronary arteriography, left ventriculography, direct infarct stenting to the left anterior descending coronary artery, left iliac arteriography, right iliac arteriography via left common femoral artery     FINGER AMPUTATION      traumatic left index finger    HERNIA REPAIR      ventral    MIDDLE EAR SURGERY Right 1966    tumor removed, some hearing loss     PACEMAKER PLACEMENT      MEDTR[employer] PACEMAKER/DEFIB    CT COLONOSCOPY W/BIOPSY SINGLE/MULTIPLE N/A 4/3/2017    Dr JOSE Herrera-diverticular disease, benign ulcerated inflammatory polyp demonstrating a granulation tissue response--1 yr recall    CT EGD TRANSORAL BIOPSY SINGLE/MULTIPLE N/A 4/3/2017    Dr Carpio Sis hiatal hernia, gastritis, chronic inflammation        Medications Prior to Admission     Prior to Admission medications    Medication Sig Start Date End Date Taking?  Authorizing Provider   aspirin EC 81 MG EC tablet Take 81 mg by mouth daily    Historical Provider, MD   nitroGLYCERIN (NITROSTAT) 0.4 MG SL tablet Place 1 tablet under the tongue every 5 minutes as needed for Chest pain 4/1/21   Kaden Garcia MD   blood glucose test strips (EXACTECH TEST) strip USE 1 STRIP FOR BLOOD TEST TWICE A DAY ALTERNATING TIMES EACH DAY *STABLE INSULIN THERAPY*     [address] [facility] [phone]    Historical Provider, MD   mupirocin (BACTROBAN) 2 % ointment mupirocin 2 % topical ointment   APPLY A SMALL AMOUNT TO THE AFFECTED AREA BY TOPICAL ROUTE 3 TIMES PER DAY    Historical Provider, MD   sotalol (BETAPACE) 80 MG tablet Take 80 mg by mouth 2 times daily     Historical Provider, MD   budesonide-formoterol (SYMBICORT) 160-4.5 MCG/ACT AERO Inhale 2 puffs into the lungs 2 times daily as needed     Historical Provider, MD   bumetanide (BUMEX) 2 MG tablet Take 2 mg by mouth Twice a Week Indications: Saturday and Tuesday     Historical Provider, MD   escitalopram (LEXAPRO) 10 MG tablet Take 10 mg by mouth daily    Historical Provider, MD   lisinopril (PRINIVIL;ZESTRIL) 20 MG tablet Take 10 mg by mouth daily     Historical Provider, MD   metFORMIN (GLUCOPHAGE) 500 MG tablet Take 500 mg by mouth 2 times daily (with meals)     Historical Provider, MD   PROAIR  (90 Base) MCG/ACT inhaler Inhale 2 puffs into the lungs as needed for Shortness of Breath  3/27/17   Historical Provider, MD   furosemide (LASIX) 40 MG tablet Take 40 mg by mouth daily  3/31/17   Historical Provider, MD   potassium chloride (KLOR-CON M) 20 MEQ extended release tablet Take 10 mEq by mouth daily  3/31/17   Historical Provider, MD   simvastatin (ZOCOR) 80 MG tablet Take 40 mg by mouth nightly     Historical Provider, MD   pantoprazole (PROTONIX) 40 MG tablet Take 40 mg by mouth daily.     Historical Provider, MD   mirtazapine (REMERON SOL-TAB) 30 MG disintegrating tablet Take 30 mg by mouth nightly     Historical Provider, MD        Allergies   Ambien [zolpidem], Lortab [hydrocodone-acetaminophen], and Phenergan  [promethazine hcl]    Social History     Social History     Tobacco History     Smoking Status  Never Smoker Smoking Tobacco Type  Cigarettes    Smokeless Tobacco Use  Former User Quit date  4/28/2014 Smokeless Tobacco Type  Chew          Alcohol History Alcohol Use Status  No          Drug Use     Drug Use Status  No          Sexual Activity     Sexually Active  Not Asked                Family History     Family History   Adopted: Yes   Problem Relation Age of Onset    Diabetes Other     Heart Disease Other        Review of Systems   Review of Systems   Constitutional: Positive for activity change and fatigue. Negative for fever. HENT: Negative for sore throat and trouble swallowing. Eyes: Negative for redness and visual disturbance. Respiratory: Positive for shortness of breath. Negative for cough and wheezing. Cardiovascular: Positive for leg swelling. Negative for chest pain. Gastrointestinal: Positive for abdominal distention. Negative for nausea and vomiting. Genitourinary: Negative for difficulty urinating. Musculoskeletal: Positive for arthralgias. Skin: Negative for color change and rash. Neurological: Negative for weakness, light-headedness and headaches. Psychiatric/Behavioral: Negative for agitation and confusion. Physical Exam   BP (!) 134/99   Pulse 82   Temp 96.6 °F (35.9 °C) (Temporal)   Resp 22   Ht 5' 6\" (1.676 m)   Wt 191 lb 6.4 oz (86.8 kg)   SpO2 95%   BMI 30.89 kg/m²     Physical Exam  Vitals and nursing note reviewed. HENT:      Head: Normocephalic and atraumatic. Nose: Nose normal.   Eyes:      Conjunctiva/sclera: Conjunctivae normal.   Cardiovascular:      Rate and Rhythm: Normal rate and regular rhythm. Pulses: Normal pulses. Pulmonary:      Comments: Diminished breath sounds upon auscultation bilaterally  Abdominal:      General: Bowel sounds are normal. There is distension. Tenderness: There is no guarding or rebound. Musculoskeletal:      Cervical back: Normal range of motion. Right lower leg: Edema present. Left lower leg: Edema present. Skin:     General: Skin is warm. Neurological:      Mental Status: He is alert and oriented to person, place, and time.  Mental status is at baseline.    Psychiatric:         Mood and Affect: Mood normal.         Labs      Recent Results (from the past 24 hour(s))   CBC Auto Differential    Collection Time: 08/04/21 11:23 AM   Result Value Ref Range    WBC 7.7 4.8 - 10.8 K/uL    RBC 5.78 4.70 - 6.10 M/uL    Hemoglobin 16.9 14.0 - 18.0 g/dL    Hematocrit 53.2 (H) 42.0 - 52.0 %    MCV 92.0 80.0 - 94.0 fL    MCH 29.2 27.0 - 31.0 pg    MCHC 31.8 (L) 33.0 - 37.0 g/dL    RDW 18.2 (H) 11.5 - 14.5 %    Platelets 256 925 - 132 K/uL    MPV 10.3 9.4 - 12.4 fL    Neutrophils % 68.6 (H) 50.0 - 65.0 %    Lymphocytes % 21.7 20.0 - 40.0 %    Monocytes % 7.2 0.0 - 10.0 %    Eosinophils % 1.2 0.0 - 5.0 %    Basophils % 1.0 0.0 - 1.0 %    Neutrophils Absolute 5.3 1.5 - 7.5 K/uL    Immature Granulocytes # 0.0 K/uL    Lymphocytes Absolute 1.7 1.1 - 4.5 K/uL    Monocytes Absolute 0.60 0.00 - 0.90 K/uL    Eosinophils Absolute 0.10 0.00 - 0.60 K/uL    Basophils Absolute 0.10 0.00 - 0.20 K/uL   Comprehensive Metabolic Panel w/ Reflex to MG    Collection Time: 08/04/21 11:23 AM   Result Value Ref Range    Sodium 140 136 - 145 mmol/L    Potassium reflex Magnesium 4.3 3.5 - 5.0 mmol/L    Chloride 101 98 - 111 mmol/L    CO2 26 22 - 29 mmol/L    Anion Gap 13 7 - 19 mmol/L    Glucose 166 (H) 74 - 109 mg/dL    BUN 28 (H) 8 - 23 mg/dL    CREATININE 1.2 0.5 - 1.2 mg/dL    GFR Non- 59 (A) >60    GFR African American >59 >59    Calcium 9.9 8.8 - 10.2 mg/dL    Total Protein 7.1 6.6 - 8.7 g/dL    Albumin 4.2 3.5 - 5.2 g/dL    Total Bilirubin 1.3 (H) 0.2 - 1.2 mg/dL    Alkaline Phosphatase 218 (H) 40 - 130 U/L    ALT 50 (H) 5 - 41 U/L    AST 55 (H) 5 - 40 U/L   Troponin    Collection Time: 08/04/21 11:23 AM   Result Value Ref Range    Troponin 0.03 0.00 - 0.03 ng/mL   Brain Natriuretic Peptide    Collection Time: 08/04/21 11:23 AM   Result Value Ref Range    Pro-BNP 4,603 (H) 0 - 1,800 pg/mL   Respiratory Panel, Molecular, with COVID-19 (Restricted: peds pts or suitable admitted adults)    Collection Time: 08/04/21 12:01 PM    Specimen: Nasopharyngeal   Result Value Ref Range    Adenovirus by PCR Not Detected Not Detected    Bordetella parapertussis by PCR Not Detected Not Detected    Bordetella pertussis by PCR Not Detected Not Detected    Chlamydophilia pneumoniae by PCR Not Detected Not Detected    Coronavirus 229E by PCR Not Detected Not Detected    Coronavirus HKU1 by PCR Not Detected Not Detected    Coronavirus NL63 by PCR Not Detected Not Detected    Coronavirus OC43 by PCR Not Detected Not Detected    SARS-CoV-2, PCR Not Detected Not Detected    Human Metapneumovirus by PCR Not Detected Not Detected    Human Rhinovirus/Enterovirus by PCR Not Detected Not Detected    Influenza A by PCR Not Detected Not Detected    Influenza B by PCR Not Detected Not Detected    Mycoplasma pneumoniae by PCR Not Detected Not Detected    Parainfluenza Virus 1 by PCR Not Detected Not Detected    Parainfluenza Virus 2 by PCR Not Detected Not Detected    Parainfluenza Virus 3 by PCR Not Detected Not Detected    Parainfluenza Virus 4 by PCR Not Detected Not Detected    Respiratory Syncytial Virus by PCR Not Detected Not Detected        Imaging/Diagnostics Last 24 Hours   XR CHEST PORTABLE    Result Date: 8/4/2021  XR CHEST PORTABLE 8/4/2021 12:11 PM HISTORY: Shortness of breath, hypoxia COMPARISON: Chest exam dated 2/18/2020. FINDINGS: Mild cardiomegaly which is stable. Prior coronary bypass. Left chest wall AICD appear stable. No lung consolidation. No pleural effusion or pneumothorax. Pulmonary vasculature are nondilated. The osseous structures and surrounding soft tissues demonstrate no acute abnormality. 1. Stable chest exam without acute process. No visualized infiltrate or pulmonary edema.  Signed by Dr Son Vargas    * (Principal) Acute on chronic combined systolic and diastolic congestive heart failure ([facility] [address] 8/4/2021 Yes    ICD (implantable cardioverter-defibrillator) pocket hematoma, subsequent encounter 8/4/2021 Yes    Coronary atherosclerosis of native coronary artery 8/4/2021 Yes    Essential hypertension 8/4/2021 Yes    Type 2 diabetes mellitus with complication, without long-term current use of insulin ([facility] [address] 8/4/2021 Yes    Ischemic cardiomyopathy (Chronic) 8/4/2021 Yes    Overview Signed 5/25/2011  4:02 PM by AMPARO Curran     EF 30%         Hyperlipidemia 8/4/2021 Yes          Plan     Acute on chronic combined systolic and diastolic CHF  - Continue IV Lasix twice daily  - Continue lisinopril, sotalol   - Strict I/Os and daily weights   - Fluid and sodium restrictions  - Will continue to monitor   - We will obtain updated transthoracic echocardiogram    Paroxysmal atrial fibrillation-chronic condition, continue with sotalol, aspirin daily    COPD-not in acute exacerbation, continue with Symbicort, maintain O2 sats greater than 90%    Hyperlipidemia-chronic condition, continue with statin therapy    Essential hypertension-chronic condition, continue antihypertensive regimen, routine vitals    Type 2 diabetes-chronic condition, hold oral antihyperglycemic's, continue with insulin modalities, Accu-Cheks q. ACH S, hypoglycemic protocol      Consultations Ordered:  IP CONSULT TO HEART FAILURE NURSE/COORDINATOR  IP CONSULT TO DIETITIAN      EMR Dragon/Transcription disclaimer:   Much of this encounter note is an electronic transcription/translation of spoken language to printed text.  The electronic translation of spoken language may permit erroneous, or at times, nonsensical words or phrases to be inadvertently transcribed; although attempts have made to review the note for such errors, some may still exist.    Electronically signed by   Sariah Kaye MD   Internal Medicine Hospitalist  On 8/4/2021  At 5:26 PM

## 2021-08-04 NOTE — ED PROVIDER NOTES
[address]      Pt Name: Sherlyn Paiz  MRN: [id number]  [place] 1945  Date of evaluation: 8/4/2021  Provider: Shira Harding MD    CHIEF COMPLAINT       Chief Complaint   Patient presents with    Shortness of Breath         HISTORY OF PRESENT ILLNESS   (Location/Symptom, Timing/Onset,Context/Setting, Quality, Duration, Modifying Factors, Severity)  Note limiting factors. Sherlyn Paiz is a 68 y.o. male who presents to the emergency department for evaluation after he has had gradually worsening shortness of breath over the last 2 months, more significantly recently. This is associated with significant bilateral lower extremity and abdominal swelling. Takes Lasix and states he is also had another diuretic that he has been taking twice a week recently but has continued to have weight gain and worsening symptoms. Has a history of ischemic cardiomyopathy with ICD in place. Last echo showed ejection fraction 35 to 40% with grade 1 diastolic dysfunction. States the shortness of breath worsens with minimal exertion or lying flat. The history is provided by the patient, the spouse and medical records. NursingNotes were reviewed. REVIEW OF SYSTEMS    (2-9 systems for level 4, 10 or more for level 5)     Review of Systems   Constitutional: Positive for activity change and unexpected weight change. Negative for fatigue and fever. HENT: Negative for congestion, rhinorrhea and voice change. Eyes: Negative for pain and redness. Respiratory: Positive for shortness of breath. Negative for cough. Cardiovascular: Positive for leg swelling. Negative for chest pain. Gastrointestinal: Positive for abdominal distention. Negative for abdominal pain, diarrhea and vomiting. Endocrine: Negative. Genitourinary: Negative. Musculoskeletal: Negative for arthralgias and gait problem. Skin: Negative for rash and wound.    Neurological: Negative for weakness and headaches. Hematological: Negative. Psychiatric/Behavioral: Negative. All other systems reviewed and are negative. A complete review of systems was performed and is negative except as noted above in the HPI. PAST MEDICAL HISTORY     Past Medical History:   Diagnosis Date    Acute myocardial infarction of anterolateral wall, subsequent episode of care [facility]) 12/25/2011    AICD discharge     Allergies     Arthritis     CAD (coronary artery disease) of artery bypass graft     Cardiomyopathy, ischemic 5/25/2011    Coronary atherosclerosis of native coronary artery     Elevated PSA     Hyperlipidemia     Hyperlipidemia     Cholesterol management per Shiv Mckenzie M.D.    Hypertension     ICD (implantable cardiac defibrillator) 5/25/2011    Rapid atrial fibrillation (HCC)     Shortness of breath     Type II or unspecified type diabetes mellitus without mention of complication, not stated as uncontrolled          SURGICAL HISTORY       Past Surgical History:   Procedure Laterality Date    APPENDECTOMY      ARM SURGERY Left 10/14/2019    EXCISION LIPOMA LEFT ARM performed by Milagro Garcia MD at [address] [facility]  4/29/14  [facility]    2 vessel CAD.  EF 45%    CARDIAC PACEMAKER PLACEMENT      medtronic     CHOLECYSTECTOMY, LAPAROSCOPIC N/A 9/26/2016    CHOLECYSTECTOMY LAPAROSCOPIC performed by Daryl Arellano MD at [address] [facility] GRAFT  01/03/2011    2V CABG (LIMA - LAD, SVG - PLM)    DIAGNOSTIC CARDIAC CATH LAB PROCEDURE  12/25/10    left heart cath, selective coronary arteriography, left ventriculography, direct infarct stenting to the left anterior descending coronary artery, left iliac arteriography, right iliac arteriography via left common femoral artery     FINGER AMPUTATION      traumatic left index finger    HERNIA REPAIR      ventral    MIDDLE EAR SURGERY Right 1966    tumor removed, some hearing loss     PACEMAKER PLACEMENT MEDTRONIC PACEMAKER/DEFIB    NH COLONOSCOPY W/BIOPSY SINGLE/MULTIPLE N/A 4/3/2017    Dr JOSE Herrera-diverticular disease, benign ulcerated inflammatory polyp demonstrating a granulation tissue response--1 yr recall    NH EGD TRANSORAL BIOPSY SINGLE/MULTIPLE N/A 4/3/2017    Dr Ricci Learn hiatal hernia, gastritis, chronic inflammation         CURRENT MEDICATIONS       Discharge Medication List as of 8/5/2021  2:10 PM      CONTINUE these medications which have NOT CHANGED    Details   Multiple Vitamins-Minerals (THERAPEUTIC MULTIVITAMIN-MINERALS) tablet Take 1 tablet by mouth dailyHistorical Med      acetaminophen (TYLENOL) 325 MG tablet Take 650 mg by mouth every 6 hours as needed for PainHistorical Med      aspirin EC 81 MG EC tablet Take 81 mg by mouth dailyHistorical Med      blood glucose test strips (EXACTECH TEST) strip Historical Med      sotalol (BETAPACE) 80 MG tablet Take 80 mg by mouth 2 times daily Historical Med      budesonide-formoterol (SYMBICORT) 160-4.5 MCG/ACT AERO Inhale 2 puffs into the lungs 2 times daily as needed Historical Med      bumetanide (BUMEX) 2 MG tablet Take 2 mg by mouth Twice a Week Indications: Saturday and Tuesday Historical Med      escitalopram (LEXAPRO) 10 MG tablet Take 10 mg by mouth dailyHistorical Med      lisinopril (PRINIVIL;ZESTRIL) 20 MG tablet Take 10 mg by mouth daily Historical Med      metFORMIN (GLUCOPHAGE) 500 MG tablet Take 500 mg by mouth 2 times daily (with meals) Historical Med      PROAIR  (90 Base) MCG/ACT inhaler Inhale 2 puffs into the lungs as needed for Shortness of Breath , R-1, DAWHistorical Med      furosemide (LASIX) 40 MG tablet Take 40 mg by mouth daily , R-2Historical Med      potassium chloride (KLOR-CON M) 20 MEQ extended release tablet Take 10 mEq by mouth daily , R-2Historical Med      simvastatin (ZOCOR) 80 MG tablet Take 40 mg by mouth nightly Historical Med      pantoprazole (PROTONIX) 40 MG tablet Take 40 mg by mouth daily. mirtazapine (REMERON SOL-TAB) 30 MG disintegrating tablet Take 30 mg by mouth nightly Historical Med      nitroGLYCERIN (NITROSTAT) 0.4 MG SL tablet Place 1 tablet under the tongue every 5 minutes as needed for Chest pain, Disp-25 tablet, R-3Normal             ALLERGIES     Ambien [zolpidem], Lortab [hydrocodone-acetaminophen], and Phenergan  [promethazine hcl]    FAMILY HISTORY       Family History   Adopted: Yes   Problem Relation Age of Onset    Diabetes Other     Heart Disease Other           SOCIAL HISTORY       Social History     Socioeconomic History    Marital status:      Spouse name: None    Number of children: None    Years of education: None    Highest education level: None   Occupational History    None   Tobacco Use    Smoking status: Never Smoker    Smokeless tobacco: Former User     Types: Chew   Vaping Use    Vaping Use: Never used   Substance and Sexual Activity    Alcohol use: No     Alcohol/week: 0.0 standard drinks    Drug use: No    Sexual activity: None   Other Topics Concern    None   Social History Narrative    None     Social Determinants of Health     Financial Resource Strain:     Difficulty of Paying Living Expenses:    Food Insecurity:     Worried About Running Out of Food in the Last Year:     Ran Out of Food in the Last Year:    Transportation Needs:     Lack of Transportation (Medical):      Lack of Transportation (Non-Medical):    Physical Activity:     Days of Exercise per Week:     Minutes of Exercise per Session:    Stress:     Feeling of Stress :    Social Connections:     Frequency of Communication with Friends and Family:     Frequency of Social Gatherings with Friends and Family:     Attends [employer] Services:     Active Member of Clubs or Organizations:     Attends Club or Organization Meetings:     Marital Status:    Intimate Partner Violence:     Fear of Current or Ex-Partner:     Emotionally Abused:     Physically Abused:     Sexually Abused:        SCREENINGS    Arcelia Coma Scale  Eye Opening: Spontaneous  Best Verbal Response: Oriented  Best Motor Response: Obeys commands  Arcelia Coma Scale Score: 15        PHYSICAL EXAM    (up to 7 for level 4, 8 or more for level 5)     ED Triage Vitals [21 1119]   BP Temp Temp src Pulse Resp SpO2 Height Weight   131/88 97.6 °F (36.4 °C) -- 69 24 (!) 75 % 5' 6\" (1.676 m) 200 lb (90.7 kg)       Physical Exam  Vitals and nursing note reviewed. Constitutional:       General: He is not in acute distress. Appearance: He is well-developed. He is not diaphoretic. HENT:      Head: Normocephalic and atraumatic. Eyes:      General: No scleral icterus. Neck:      Vascular: No JVD. Cardiovascular:      Rate and Rhythm: Normal rate and regular rhythm. Pulses:           Radial pulses are 2+ on the right side and 2+ on the left side. Dorsalis pedis pulses are 2+ on the right side and 2+ on the left side. Heart sounds: Normal heart sounds. No murmur heard. No friction rub. No gallop. Pulmonary:      Effort: Tachypnea and accessory muscle usage present. No respiratory distress. Breath sounds: No stridor. No decreased breath sounds. Chest:      Chest wall: No tenderness. Abdominal:      General: There is no distension. Palpations: Abdomen is soft. Tenderness: There is no abdominal tenderness. There is no guarding or rebound. Musculoskeletal:         General: No deformity. Normal range of motion. Right lower le+ Edema present. Left lower le+ Edema present. Skin:     General: Skin is warm and dry. Coloration: Skin is not pale. Findings: No erythema. Neurological:      Mental Status: He is alert and oriented to person, place, and time. GCS: GCS eye subscore is 4. GCS verbal subscore is 5. GCS motor subscore is 6. Cranial Nerves: No cranial nerve deficit. Motor: No abnormal muscle tone.       Coordination: Coordination normal.   Psychiatric:         Behavior: Behavior normal.         Judgment: Judgment normal.         DIAGNOSTIC RESULTS     EKG: All EKG's are interpreted by the Emergency Department Physician who either signs or Co-signs this chart in the absence of a cardiologist.      RADIOLOGY:   Non-plain film images such as CT, Ultrasound and MRI are read by the radiologist. Plainradiographic images are visualized and preliminarily interpreted by the emergency physician with the below findings:        Interpretation per the Radiologist below, if available at the time of this note:    XR CHEST PORTABLE   Final Result   1. Stable chest exam without acute process. No visualized infiltrate   or pulmonary edema.    Signed by Dr Fe Mcleod            ED BEDSIDE ULTRASOUND:   Performed by ED Physician - none    LABS:  Labs Reviewed   CBC WITH AUTO DIFFERENTIAL - Abnormal; Notable for the following components:       Result Value    Hematocrit 53.2 (*)     MCHC 31.8 (*)     RDW 18.2 (*)     Neutrophils % 68.6 (*)     All other components within normal limits   COMPREHENSIVE METABOLIC PANEL W/ REFLEX TO MG FOR LOW K - Abnormal; Notable for the following components:    Glucose 166 (*)     BUN 28 (*)     GFR Non- 59 (*)     Total Bilirubin 1.3 (*)     Alkaline Phosphatase 218 (*)     ALT 50 (*)     AST 55 (*)     All other components within normal limits   BRAIN NATRIURETIC PEPTIDE - Abnormal; Notable for the following components:    Pro-BNP 4,603 (*)     All other components within normal limits   BASIC METABOLIC PANEL - Abnormal; Notable for the following components:    BUN 26 (*)     All other components within normal limits   LIPID PANEL - Abnormal; Notable for the following components:    Cholesterol, Total 90 (*)     HDL 46 (*)     All other components within normal limits   CBC - Abnormal; Notable for the following components:    MCHC 32.6 (*)     RDW 16.9 (*)     All other components within normal limits   POCT GLUCOSE - Abnormal; Notable for the following components:    POC Glucose 180 (*)     All other components within normal limits   POCT GLUCOSE - Abnormal; Notable for the following components:    POC Glucose 240 (*)     All other components within normal limits   RESPIRATORY PANEL, MOLECULAR, WITH COVID-19   TROPONIN   MAGNESIUM   POCT GLUCOSE   POCT GLUCOSE   POCT GLUCOSE   POCT GLUCOSE   POCT GLUCOSE       All other labs were within normal range or not returned as of this dictation.     Medications   furosemide (LASIX) injection 80 mg (80 mg Intravenous Given 8/4/21 1305)       EMERGENCY DEPARTMENT COURSE and DIFFERENTIALDIAGNOSIS/MDM:   Vitals:    Vitals:    08/05/21 0653 08/05/21 0945 08/05/21 1005 08/05/21 1047   BP:       Pulse:       Resp:       Temp:       TempSrc:       SpO2: 90%   93%   Weight:  187 lb (84.8 kg)     Height:   5' 6\" (1.676 m)        MDM  Number of Diagnoses or Management Options  Acute on chronic combined systolic and diastolic CHF (congestive heart failure) ([facility] [address]): new and requires workup  Acute respiratory failure with hypoxia ([address]): new and requires workup  Bilateral lower extremity edema: established and worsening  Ischemic cardiomyopathy: established and worsening     Amount and/or Complexity of Data Reviewed  Clinical lab tests: ordered and reviewed  Tests in the radiology section of CPT®: ordered and reviewed  Tests in the medicine section of CPT®: ordered and reviewed  Decide to obtain previous medical records or to obtain history from someone other than the patient: yes  Obtain history from someone other than the patient: yes  Review and summarize past medical records: yes  Discuss the patient with other providers: yes  Independent visualization of images, tracings, or specimens: yes    Risk of Complications, Morbidity, and/or Mortality  Presenting problems: high  Diagnostic procedures: moderate  Management options: high    Patient Progress  Patient progress:

## 2021-08-05 VITALS
RESPIRATION RATE: 18 BRPM | TEMPERATURE: 96.7 F | DIASTOLIC BLOOD PRESSURE: 96 MMHG | HEART RATE: 67 BPM | WEIGHT: 187 LBS | OXYGEN SATURATION: 93 % | SYSTOLIC BLOOD PRESSURE: 134 MMHG | HEIGHT: 66 IN | BODY MASS INDEX: 30.05 KG/M2

## 2021-08-05 LAB
ANION GAP SERPL CALCULATED.3IONS-SCNC: 12 MMOL/L (ref 7–19)
BUN BLDV-MCNC: 26 MG/DL (ref 8–23)
CALCIUM SERPL-MCNC: 9.2 MG/DL (ref 8.8–10.2)
CHLORIDE BLD-SCNC: 106 MMOL/L (ref 98–111)
CHOLESTEROL, TOTAL: 90 MG/DL (ref 160–199)
CO2: 24 MMOL/L (ref 22–29)
CREAT SERPL-MCNC: 1.1 MG/DL (ref 0.5–1.2)
GFR AFRICAN AMERICAN: >59
GFR NON-AFRICAN AMERICAN: >60
GLUCOSE BLD-MCNC: 240 MG/DL (ref 70–99)
GLUCOSE BLD-MCNC: 85 MG/DL (ref 70–99)
GLUCOSE BLD-MCNC: 86 MG/DL (ref 74–109)
HCT VFR BLD CALC: 46.3 % (ref 42–52)
HDLC SERPL-MCNC: 46 MG/DL (ref 55–121)
HEMOGLOBIN: 15.1 G/DL (ref 14–18)
LDL CHOLESTEROL CALCULATED: 31 MG/DL
MAGNESIUM: 2.1 MG/DL (ref 1.6–2.4)
MCH RBC QN AUTO: 29.1 PG (ref 27–31)
MCHC RBC AUTO-ENTMCNC: 32.6 G/DL (ref 33–37)
MCV RBC AUTO: 89.2 FL (ref 80–94)
PDW BLD-RTO: 16.9 % (ref 11.5–14.5)
PERFORMED ON: ABNORMAL
PERFORMED ON: NORMAL
PLATELET # BLD: 137 K/UL (ref 130–400)
PMV BLD AUTO: 9.7 FL (ref 9.4–12.4)
POTASSIUM SERPL-SCNC: 3.9 MMOL/L (ref 3.5–5)
RBC # BLD: 5.19 M/UL (ref 4.7–6.1)
SODIUM BLD-SCNC: 142 MMOL/L (ref 136–145)
TRIGL SERPL-MCNC: 65 MG/DL (ref 0–149)
WBC # BLD: 6.4 K/UL (ref 4.8–10.8)

## 2021-08-05 PROCEDURE — 6370000000 HC RX 637 (ALT 250 FOR IP): Performed by: FAMILY MEDICINE

## 2021-08-05 PROCEDURE — 82947 ASSAY GLUCOSE BLOOD QUANT: CPT

## 2021-08-05 PROCEDURE — 94640 AIRWAY INHALATION TREATMENT: CPT

## 2021-08-05 PROCEDURE — 2580000003 HC RX 258: Performed by: FAMILY MEDICINE

## 2021-08-05 PROCEDURE — 6360000002 HC RX W HCPCS: Performed by: FAMILY MEDICINE

## 2021-08-05 PROCEDURE — 94761 N-INVAS EAR/PLS OXIMETRY MLT: CPT

## 2021-08-05 PROCEDURE — 36415 COLL VENOUS BLD VENIPUNCTURE: CPT

## 2021-08-05 PROCEDURE — 6360000004 HC RX CONTRAST MEDICATION: Performed by: FAMILY MEDICINE

## 2021-08-05 PROCEDURE — 83735 ASSAY OF MAGNESIUM: CPT

## 2021-08-05 PROCEDURE — 80061 LIPID PANEL: CPT

## 2021-08-05 PROCEDURE — 85027 COMPLETE CBC AUTOMATED: CPT

## 2021-08-05 PROCEDURE — 2700000000 HC OXYGEN THERAPY PER DAY

## 2021-08-05 PROCEDURE — 80048 BASIC METABOLIC PNL TOTAL CA: CPT

## 2021-08-05 RX ADMIN — PERFLUTREN 1.65 MG: 6.52 INJECTION, SUSPENSION INTRAVENOUS at 09:20

## 2021-08-05 RX ADMIN — SOTALOL HYDROCHLORIDE 80 MG: 80 TABLET ORAL at 08:52

## 2021-08-05 RX ADMIN — SODIUM CHLORIDE, PRESERVATIVE FREE 10 ML: 5 INJECTION INTRAVENOUS at 08:53

## 2021-08-05 RX ADMIN — ESCITALOPRAM OXALATE 10 MG: 10 TABLET ORAL at 08:52

## 2021-08-05 RX ADMIN — POTASSIUM CHLORIDE 10 MEQ: 750 TABLET, EXTENDED RELEASE ORAL at 08:52

## 2021-08-05 RX ADMIN — INSULIN LISPRO 2 UNITS: 100 INJECTION, SOLUTION INTRAVENOUS; SUBCUTANEOUS at 11:47

## 2021-08-05 RX ADMIN — FUROSEMIDE 40 MG: 10 INJECTION, SOLUTION INTRAMUSCULAR; INTRAVENOUS at 08:53

## 2021-08-05 RX ADMIN — LISINOPRIL 10 MG: 10 TABLET ORAL at 08:52

## 2021-08-05 RX ADMIN — ARFORMOTEROL TARTRATE 15 MCG: 15 SOLUTION RESPIRATORY (INHALATION) at 06:52

## 2021-08-05 RX ADMIN — BUDESONIDE 500 MCG: 0.5 SUSPENSION RESPIRATORY (INHALATION) at 06:52

## 2021-08-05 RX ADMIN — ENOXAPARIN SODIUM 40 MG: 40 INJECTION SUBCUTANEOUS at 08:52

## 2021-08-05 RX ADMIN — ASPIRIN 81 MG: 81 TABLET, COATED ORAL at 08:52

## 2021-08-05 RX ADMIN — PANTOPRAZOLE SODIUM 40 MG: 40 TABLET, DELAYED RELEASE ORAL at 08:52

## 2021-08-05 ASSESSMENT — PAIN SCALES - GENERAL: PAINLEVEL_OUTOF10: 0

## 2021-08-05 NOTE — CONSULTS
Comprehensive Nutrition Assessment    Type and Reason for Visit:  Initial, Consult    Nutrition Recommendations/Plan: follow for questions     Nutrition Assessment:  Pt is well nourished AEB fat and muscle mass. Recieved consult for CHF education. Diet hx reveals pt does use some salt. But dietary habits have changed and more fresh fruits. vegetables, whole grains are being consumed. Seasoning list and list of foods with hidden salt given. Pt very receptive. Malnutrition Assessment:  Malnutrition Status:  No malnutrition    Context:  Acute Illness     Findings of the 6 clinical characteristics of malnutrition:  Energy Intake:  Mild decrease in energy intake (Comment)  Weight Loss:  No significant weight loss     Body Fat Loss:  No significant body fat loss     Muscle Mass Loss:  No significant muscle mass loss    Fluid Accumulation:  7 - Moderate to Severe Extremities   Strength:  Not Performed    Nutrition Related Findings:  adequately nourished      Wounds:  None       Current Nutrition Therapies:    ADULT DIET; Regular; Low Sodium (2 gm); 1800 ml    Anthropometric Measures:  · Height: 5' 6\" (167.6 cm)  · Current Body Weight: 191 lb 6.4 oz (86.8 kg)   · Admission Body Weight: 200 lb (90.7 kg) (stated)    · Usual Body Weight: 192 lb (87.1 kg) (7/2021)     · Ideal Body Weight: 142 lbs; % Ideal Body Weight 134.8 %   · BMI: 30.9  · BMI Categories: Obese Class 1 (BMI 30.0-34. 9)       Nutrition Diagnosis:   · Altered nutrition-related lab values related to cardiac dysfunction as evidenced by lab values      Nutrition Interventions:   Food and/or Nutrient Delivery:  Continue Current Diet  Nutrition Education/Counseling:  Education completed   Coordination of Nutrition Care:  Continue to monitor while inpatient    Goals:  PO intake 50% or greater. WEight decrease 1-3# per week.   proBNP decreased prior to discharge       Nutrition Monitoring and Evaluation:   Behavioral-Environmental Outcomes:  None Identified   Food/Nutrient Intake Outcomes:  Food and Nutrient Intake  Physical Signs/Symptoms Outcomes:  Biochemical Data, Weight, Skin, Nutrition Focused Physical Findings, Fluid Status or Edema     Discharge Planning:    Continue current diet     Electronically signed by Jean Mcmahon MS, RD, LD on 8/5/21 at 10:18 AM CDT    Contact: [phone]

## 2021-08-05 NOTE — CARE COORDINATION
Informed by pt's RN [facility] the pt qualifies for home O2 and requested [facility] as supplier d/t having an account established for his spouse.      GILBERT faxed the referral and requested a portable delivered to [facility]     [facility]  PH: [phone]  FA: [phone]

## 2021-08-05 NOTE — DISCHARGE SUMMARY
Discharge Summary  Date:8/5/2021        Patient Thora Prader     YOB: 1945     Age:76 y.o. Admit Date:8/4/2021   Admission Condition:poor   Discharged Condition:fair  Discharge Date: 08/05/21     Discharge Diagnoses   Principal Problem:    Acute on chronic combined systolic and diastolic congestive heart failure (HCC)  Active Problems:    ICD (implantable cardioverter-defibrillator) pocket hematoma, subsequent encounter    Coronary atherosclerosis of native coronary artery    Essential hypertension    Type 2 diabetes mellitus with complication, without long-term current use of insulin (HCC)    Ischemic cardiomyopathy    Hyperlipidemia    Severe pulmonary hypertension ([address]  Resolved Problems:    * No resolved hospital problems. [facility] Stay   Narrative of Hospital Course: Patient was admitted 64, 59-year-old obese  male with a known past medical history of  combined congestive heart failure, CAD, ICD in place, hypertension, hyperlipidemia, type 2 diabetes scented to the emergency room due to worsening shortness of breath over the past few days as well as abdominal distention and bilateral lower extremity swelling. Patient follows up with cardiology Dr. Gaurang Cason. Patient admitted to PCU with telemetry monitoring undergoing diuresis with cumulative I's and O's being negative. Continues on diuresis therapy, echocardiogram has been reviewed ejection fraction noted to be 35%, evidence also of severe pulmonary hypertension. Patient underwent home O2 evaluation in which he qualifies for 4 L of oxygen therapy. DME order has been provided. Home health with rescue orders also ordered prior to patient's discharge. Patient is to follow-up with primary care provider in the next week for hospital discharge was ongoing coordination of care. Education modalities are attached after visit summary.     Consultants:   IP CONSULT TO HEART FAILURE NURSE/COORDINATOR  IP CONSULT TO DIETITIAN  IP CONSULT TO HOME CARE NEEDS    Time Spent on Discharge:  45 minutes were spent in patient examination, evaluation, counseling as well as medication reconciliation, prescriptions for required medications, discharge plan and follow up.       Surgeries/Procedures Performed:       Treatments:   analgesia: acetaminophen, cardiac meds: Lipitor, Lasix, lisinopril, Betapace, anticoagulation: ASA and LMW heparin, insulin: Humalog, respiratory therapy: O2 and electrolyte monitoring and stabilization    Significant Diagnostic Studies:   Recent Labs:  CBC:   Lab Results   Component Value Date    WBC 6.4 08/05/2021    RBC 5.19 08/05/2021    HGB 15.1 08/05/2021    HCT 46.3 08/05/2021    HCT 42.1 03/11/2012    MCV 89.2 08/05/2021    MCH 29.1 08/05/2021    MCHC 32.6 08/05/2021    RDW 16.9 08/05/2021     08/05/2021     03/11/2012     BMP:    Lab Results   Component Value Date    GLUCOSE 86 08/05/2021     08/05/2021     03/11/2012    K 3.9 08/05/2021    K 4.3 08/04/2021    K 4.5 03/11/2012     08/05/2021     03/11/2012    CO2 24 08/05/2021    ANIONGAP 12 08/05/2021    BUN 26 08/05/2021    CREATININE 1.1 08/05/2021    CREATININE 1.0 03/11/2012    CALCIUM 9.2 08/05/2021    LABGLOM >60 08/05/2021    GFRAA >59 08/05/2021     HFP:    Lab Results   Component Value Date    PROT 7.1 08/04/2021    PROT 5.9 03/11/2012     CMP:    Lab Results   Component Value Date    GLUCOSE 86 08/05/2021     08/05/2021     03/11/2012    K 3.9 08/05/2021    K 4.3 08/04/2021    K 4.5 03/11/2012     08/05/2021     03/11/2012    CO2 24 08/05/2021    BUN 26 08/05/2021    CREATININE 1.1 08/05/2021    CREATININE 1.0 03/11/2012    ANIONGAP 12 08/05/2021    ALKPHOS 218 08/04/2021    ALKPHOS 61 03/11/2012    ALT 50 08/04/2021    AST 55 08/04/2021    BILITOT 1.3 08/04/2021    LABALBU 4.2 08/04/2021    LABALBU 4.0 03/11/2012    LABGLOM >60 08/05/2021    GFRAA >59 08/05/2021    PROT 7.1 08/04/2021 PROT 5.9 03/11/2012    CALCIUM 9.2 08/05/2021     PT/INR:    Lab Results   Component Value Date    PROTIME 12.5 04/26/2014    PROTIME 9.60 03/11/2012    INR 0.98 04/26/2014     PTT: No results found for: APTT  FLP:    Lab Results   Component Value Date    CHOL 90 08/05/2021    TRIG 65 08/05/2021    HDL 46 08/05/2021     U/A:    Lab Results   Component Value Date    COLORU yellow 07/29/2021    COLORU Yellow 02/18/2020    SPECGRAV 1.025 07/29/2021    SPECGRAV 1.025 02/18/2020    PHUR 5.0 07/29/2021    PHUR 5.5 02/18/2020    PROTEINU 100mg/dl 07/29/2021    PROTEINU 30 02/18/2020    GLUCOSEU neg 07/29/2021    GLUCOSEU Negative 02/18/2020    KETUA neg 07/29/2021    KETUA Negative 02/18/2020    BILIRUBINUR neg 07/29/2021    UROBILINOGEN 0.2 02/18/2020    NITRU Negative 02/18/2020    LEUKOCYTESUR neg 07/29/2021    LEUKOCYTESUR Negative 02/18/2020     TSH:    Lab Results   Component Value Date    TSH 1.78 04/26/2014       Radiology Last 7 Days:  XR CHEST PORTABLE    Result Date: 8/4/2021  1. Stable chest exam without acute process. No visualized infiltrate or pulmonary edema. Signed by Dr Eleuterio Javed and nursing note reviewed. Constitutional:       General: He is not in acute distress. Appearance: He is obese. He is not toxic-appearing. HENT:      Head: Normocephalic and atraumatic. Nose: Nose normal.   Eyes:      General:         Right eye: No discharge. Left eye: No discharge. Conjunctiva/sclera: Conjunctivae normal.   Cardiovascular:      Rate and Rhythm: Normal rate and regular rhythm. Pulses: Normal pulses. Pulmonary:      Comments: Improved breath sounds still appreciate the management bilaterally upon auscultation    Nasal cannula oxygen in place  Abdominal:      General: Bowel sounds are normal.      Tenderness: There is no guarding or rebound. Comments: Abdominal distention improved   Musculoskeletal:      Right lower leg: Edema present. Left lower leg: Edema present. Comments: Bilateral lower extremity edema improved   Neurological:      Mental Status: He is alert and oriented to person, place, and time. Mental status is at baseline.    Psychiatric:         Mood and Affect: Mood normal.           Discharge Plan   Disposition: Home    Provider Follow-Up:   Dayna Saldaña MD  [address] [facility] [id number]  [address] [facility]  [phone]    On 8/5/2021  Hospital follow-up         Patient Instructions   Diet: cardiac diet and diabetic diet    Activity: activity as tolerated      Discharge Medications         Medication List      CONTINUE taking these medications    acetaminophen 325 MG tablet  Commonly known as: TYLENOL     aspirin EC 81 MG EC tablet     bumetanide 2 MG tablet  Commonly known as: BUMEX     escitalopram 10 MG tablet  Commonly known as: LEXAPRO     [employer] Test strip  Generic drug: blood glucose test strips     furosemide 40 MG tablet  Commonly known as: LASIX     lisinopril 20 MG tablet  Commonly known as: PRINIVIL;ZESTRIL     metFORMIN 500 MG tablet  Commonly known as: GLUCOPHAGE     mirtazapine 30 MG disintegrating tablet  Commonly known as: REMERON SOL-TAB     nitroGLYCERIN 0.4 MG SL tablet  Commonly known as: Nitrostat  Place 1 tablet under the tongue every 5 minutes as needed for Chest pain     pantoprazole 40 MG tablet  Commonly known as: PROTONIX     potassium chloride 20 MEQ extended release tablet  Commonly known as: KLOR-CON M     ProAir  (90 Base) MCG/ACT inhaler  Generic drug: albuterol sulfate HFA     simvastatin 80 MG tablet  Commonly known as: ZOCOR     sotalol 80 MG tablet  Commonly known as: BETAPACE     Symbicort 160-4.5 MCG/ACT Aero  Generic drug: budesonide-formoterol     therapeutic multivitamin-minerals tablet        STOP taking these medications    Bactroban 2 % ointment  Generic drug: mupirocin            EMR Dragon/Transcription disclaimer:   Much of this encounter note is an electronic transcription/translation of spoken language to printed text.  The electronic translation of spoken language may permit erroneous, or at times, nonsensical words or phrases to be inadvertently transcribed; although attempts have made to review the note for such errors, some may still exist.    Electronically signed by   Everardo Blair MD   Internal Medicine Hospitalist  On 8/5/2021  At 1:38 PM

## 2021-08-05 NOTE — PROGRESS NOTES
Pt is alert and oriented. No complaints. Still SOA with activity, but feels much better today and states edema is lessened and abdominal edema now soft. Lungs with bilateral scattered fine insp crackles lower lobes and diminished breath sounds in lower lobes. Edema BUE 1+, BLE 2+, abdomen is soft with some mild edema. Pt is up on side of bed eating breakfast. HR sinus.  Electronically signed by Isaak Ho RN on 8/5/2021 at 12:07 PM

## 2021-08-06 NOTE — CONSULTS
Pt EF 35% on 8/4 with BNP of 4,603 on admission. Pt was diuresed and dc home before seen but teaching for CHF diet was done by Guera Scott dietitian while here. His care is managed by Dr Damien Guzman who manages his own pts without use of CHF clinic.

## 2021-08-09 ENCOUNTER — TELEPHONE (OUTPATIENT)
Dept: CARDIOLOGY CLINIC | Age: 76
End: 2021-08-09

## 2021-08-09 NOTE — TELEPHONE ENCOUNTER
Dr. Blue Murphy office called and stated pt. Was in hospital Wednesday or Thursday of last week for CHF. They seen pt. Today for followup and per Dr. Liam Garcia wants pt. Seen asap for followup with Dr. Nicole Quesada. Please call to schedule.

## 2021-08-12 ENCOUNTER — OFFICE VISIT (OUTPATIENT)
Dept: CARDIOLOGY CLINIC | Age: 76
End: 2021-08-12
Payer: MEDICARE

## 2021-08-12 VITALS
BODY MASS INDEX: 31.34 KG/M2 | HEART RATE: 61 BPM | WEIGHT: 195 LBS | HEIGHT: 66 IN | OXYGEN SATURATION: 81 % | SYSTOLIC BLOOD PRESSURE: 112 MMHG | DIASTOLIC BLOOD PRESSURE: 74 MMHG

## 2021-08-12 DIAGNOSIS — Z95.1 S/P CABG (CORONARY ARTERY BYPASS GRAFT): ICD-10-CM

## 2021-08-12 DIAGNOSIS — I50.22 CHRONIC SYSTOLIC (CONGESTIVE) HEART FAILURE (HCC): ICD-10-CM

## 2021-08-12 DIAGNOSIS — T82.837D: ICD-10-CM

## 2021-08-12 DIAGNOSIS — E78.2 MIXED HYPERLIPIDEMIA: ICD-10-CM

## 2021-08-12 DIAGNOSIS — I27.20 SEVERE PULMONARY HYPERTENSION (HCC): Primary | ICD-10-CM

## 2021-08-12 DIAGNOSIS — Z79.899 LONG TERM CURRENT USE OF ANTIARRHYTHMIC DRUG: ICD-10-CM

## 2021-08-12 DIAGNOSIS — I25.5 ISCHEMIC CARDIOMYOPATHY: ICD-10-CM

## 2021-08-12 DIAGNOSIS — I10 ESSENTIAL HYPERTENSION: ICD-10-CM

## 2021-08-12 PROCEDURE — 99214 OFFICE O/P EST MOD 30 MIN: CPT | Performed by: INTERNAL MEDICINE

## 2021-08-12 PROCEDURE — 1111F DSCHRG MED/CURRENT MED MERGE: CPT | Performed by: INTERNAL MEDICINE

## 2021-08-12 PROCEDURE — G8427 DOCREV CUR MEDS BY ELIG CLIN: HCPCS | Performed by: INTERNAL MEDICINE

## 2021-08-12 PROCEDURE — G8417 CALC BMI ABV UP PARAM F/U: HCPCS | Performed by: INTERNAL MEDICINE

## 2021-08-12 PROCEDURE — 1036F TOBACCO NON-USER: CPT | Performed by: INTERNAL MEDICINE

## 2021-08-12 PROCEDURE — 4040F PNEUMOC VAC/ADMIN/RCVD: CPT | Performed by: INTERNAL MEDICINE

## 2021-08-12 PROCEDURE — 1123F ACP DISCUSS/DSCN MKR DOCD: CPT | Performed by: INTERNAL MEDICINE

## 2021-08-12 RX ORDER — LISINOPRIL 5 MG/1
5 TABLET ORAL 2 TIMES DAILY
Qty: 60 TABLET | Refills: 3 | Status: SHIPPED | OUTPATIENT
Start: 2021-08-12

## 2021-08-12 ASSESSMENT — ENCOUNTER SYMPTOMS
NAUSEA: 0
RESPIRATORY NEGATIVE: 1
EYES NEGATIVE: 1
SHORTNESS OF BREATH: 0
GASTROINTESTINAL NEGATIVE: 1
DIARRHEA: 0
VOMITING: 0

## 2021-08-12 NOTE — PROGRESS NOTES
Physician Progress Note      PATIENT:               Austin Piper  [facility] #:                  [id number]  :                       1945  ADMIT DATE:       2021 11:09 AM  DISCH DATE:        2021 2:45 PM  RESPONDING  PROVIDER #:        Kina Olsen TEXT:    Patient admitted with a/c combined CHF. Noted documentation of acute   respiratory failure in ED notes from 21. In order to support the diagnosis   of acute respiratory failure, please include additional clinical indicators   in your documentation. Or please document if the diagnosis of acute   respiratory failure has been ruled out after further study. The medical record reflects the following:  Risk Factors: CHF, COPD, afib,  Clinical Indicators: presented to ED with shortness of breath with RR 24 and   O2 sat 75%; pulmonary assessment-normal breath sounds, pulmonary effort is   normal with no accessory muscle usage or respiratory distress  Treatment: Lasix 80 mg IV, CXR, O2, Pulmicort,    Thank you,  Nestor HESS, CCDS  [phone]    Acute Respiratory Failure Clinical Indicators per [employer] Training Guide and   Quick Reference Guide:  pO2 < 60 mmHg or SpO2 (pulse oximetry) < 91% breathing room air  pCO2 > 50 and pH < 7.35  P/F ratio (pO2 / FIO2) < 300  pO2 decrease or pCO2 increase by 10 mmHg from baseline (if known)  Supplemental oxygen of 40% or more  Presence of respiratory distress, tachypnea, dyspnea, shortness of breath,   wheezing  Unable to speak in complete sentences  Use of accessory muscles to breathe  Extreme anxiety and feeling of impending doom  Tripod position  Confusion/altered mental status/obtunded  Options provided:  -- Acute Respiratory Failure as evidenced by, Please document evidence.   -- Acute Respiratory Failure ruled out after study  -- Other - I will add my own diagnosis  -- Disagree - Not applicable / Not valid  -- Disagree - Clinically unable to determine / Unknown  -- Refer to Clinical

## 2021-08-12 NOTE — PROGRESS NOTES
[facility] Progress Note                            Date:  8/12/2021  Patient: Adonis Castellanos  Age:  68 y.o., 1945      Reason for evaluation:         SUBJECTIVE:    Returns today follow-up assessment. Apparently hospitalized last week for several days with congestive heart failure. Placed on oxygen. Still very short of breath increased edema. He is on both Lasix daily and supplemental Bumex twice weekly as well as lisinopril once daily. Denies chest pain denies ICD shocks. States that he is exhausted and wiped out has no energy his legs were all rubber. No other complaints or issues reported. proBNP was 4603 on 8/4/2021. Review of Systems   Constitutional: Negative. Negative for chills, fever and unexpected weight change. HENT: Negative. Eyes: Negative. Respiratory: Negative. Negative for shortness of breath. Cardiovascular: Negative. Negative for chest pain. Gastrointestinal: Negative. Negative for diarrhea, nausea and vomiting. Endocrine: Negative. Genitourinary: Negative. Musculoskeletal: Negative. Skin: Negative. Neurological: Negative. All other systems reviewed and are negative. OBJECTIVE:     /74   Pulse 61   Ht 5' 6\" (1.676 m)   Wt 195 lb (88.5 kg)   SpO2 (!) 81%   BMI 31.47 kg/m²     Labs:   CBC: No results for input(s): WBC, HGB, HCT, PLT in the last 72 hours. BMP:No results for input(s): NA, K, CO2, BUN, CREATININE, LABGLOM, GLUCOSE in the last 72 hours. BNP: No results for input(s): BNP in the last 72 hours. PT/INR: No results for input(s): PROTIME, INR in the last 72 hours. APTT:No results for input(s): APTT in the last 72 hours. CARDIAC ENZYMES:No results for input(s): CKTOTAL, CKMB, CKMBINDEX, TROPONINI in the last 72 hours.   FASTING LIPID PANEL:  Lab Results   Component Value Date    HDL 46 08/05/2021    LDLDIRECT 69 04/27/2014    LDLCALC 31 08/05/2021    TRIG 65 08/05/2021     LIVER PROFILE:No results for input(s): AST, ALT, LABALBU in the last 72 hours. Past Medical History:   Diagnosis Date    Acute myocardial infarction of anterolateral wall, subsequent episode of care [facility]) 12/25/2011    AICD discharge     Allergies     Arthritis     CAD (coronary artery disease) of artery bypass graft     Cardiomyopathy, ischemic 5/25/2011    Coronary atherosclerosis of native coronary artery     Elevated PSA     Hyperlipidemia     Hyperlipidemia     Cholesterol management per Fermin Hoyos M.D.    Hypertension     ICD (implantable cardiac defibrillator) 5/25/2011    Rapid atrial fibrillation (HCC)     Shortness of breath     Type II or unspecified type diabetes mellitus without mention of complication, not stated as uncontrolled      Past Surgical History:   Procedure Laterality Date    APPENDECTOMY      ARM SURGERY Left 10/14/2019    EXCISION LIPOMA LEFT ARM performed by Laurell Closs, MD at [address] [facility]  4/29/14  [facility]    2 vessel CAD.  EF 45%    CARDIAC PACEMAKER PLACEMENT      medtronic     CHOLECYSTECTOMY, LAPAROSCOPIC N/A 9/26/2016    CHOLECYSTECTOMY LAPAROSCOPIC performed by Lesley Win MD at [address] [facility] GRAFT  01/03/2011    2V CABG (LIMA - LAD, SVG - PLM)    DIAGNOSTIC CARDIAC CATH LAB PROCEDURE  12/25/10    left heart cath, selective coronary arteriography, left ventriculography, direct infarct stenting to the left anterior descending coronary artery, left iliac arteriography, right iliac arteriography via left common femoral artery     FINGER AMPUTATION      traumatic left index finger    HERNIA REPAIR      ventral    MIDDLE EAR SURGERY Right 1966    tumor removed, some hearing loss     PACEMAKER PLACEMENT      [employer] PACEMAKER/DEFIB    [place] COLONOSCOPY W/BIOPSY SINGLE/MULTIPLE N/A 4/3/2017    Dr JOSE Herrera-diverticular disease, benign ulcerated inflammatory polyp demonstrating a granulation tissue response--1 yr recall    [place] EGD TRANSORAL BIOPSY SINGLE/MULTIPLE N/A 4/3/2017    Dr Raúl Long hiatal hernia, gastritis, chronic inflammation     Family History   Adopted: Yes   Problem Relation Age of Onset    Diabetes Other     Heart Disease Other      Allergies   Allergen Reactions    Ambien [Zolpidem]      Unknown      Lortab [Hydrocodone-Acetaminophen] Other (See Comments)     One tab daily caused bowel obstruction    Phenergan  [Promethazine Hcl] Rash     Current Outpatient Medications   Medication Sig Dispense Refill    Multiple Vitamins-Minerals (THERAPEUTIC MULTIVITAMIN-MINERALS) tablet Take 1 tablet by mouth daily      acetaminophen (TYLENOL) 325 MG tablet Take 650 mg by mouth every 6 hours as needed for Pain      aspirin EC 81 MG EC tablet Take 81 mg by mouth daily      nitroGLYCERIN (NITROSTAT) 0.4 MG SL tablet Place 1 tablet under the tongue every 5 minutes as needed for Chest pain 25 tablet 3    blood glucose test strips (EXACTECH TEST) strip USE 1 STRIP FOR BLOOD TEST TWICE A DAY ALTERNATING TIMES EACH DAY *STABLE INSULIN THERAPY*     DEC 30,2019      sotalol (BETAPACE) 80 MG tablet Take 80 mg by mouth 2 times daily       budesonide-formoterol (SYMBICORT) 160-4.5 MCG/ACT AERO Inhale 2 puffs into the lungs 2 times daily as needed       bumetanide (BUMEX) 2 MG tablet Take 2 mg by mouth Twice a Week Indications: Saturday and Tuesday       escitalopram (LEXAPRO) 10 MG tablet Take 10 mg by mouth daily      lisinopril (PRINIVIL;ZESTRIL) 20 MG tablet Take 10 mg by mouth daily       metFORMIN (GLUCOPHAGE) 500 MG tablet Take 500 mg by mouth 2 times daily (with meals)       PROAIR  (90 Base) MCG/ACT inhaler Inhale 2 puffs into the lungs as needed for Shortness of Breath   1    furosemide (LASIX) 40 MG tablet Take 40 mg by mouth daily   2    potassium chloride (KLOR-CON M) 20 MEQ extended release tablet Take 10 mEq by mouth daily   2    simvastatin (ZOCOR) 80 MG tablet Take 40 mg by mouth nightly       pantoprazole (PROTONIX) 40 MG tablet Take 40 mg by mouth daily.  mirtazapine (REMERON SOL-TAB) 30 MG disintegrating tablet Take 30 mg by mouth nightly        No current facility-administered medications for this visit. Social History     Socioeconomic History    Marital status:      Spouse name: Not on file    Number of children: Not on file    Years of education: Not on file    Highest education level: Not on file   Occupational History    Not on file   Tobacco Use    Smoking status: Never Smoker    Smokeless tobacco: Former User     Types: Chew   Vaping Use    Vaping Use: Never used   Substance and Sexual Activity    Alcohol use: No     Alcohol/week: 0.0 standard drinks    Drug use: No    Sexual activity: Not on file   Other Topics Concern    Not on file   Social History Narrative    Not on file     Social Determinants of Health     Financial Resource Strain:     Difficulty of Paying Living Expenses:    Food Insecurity:     Worried About [address] in the Last Year:     [address] [place] in the Last Year:    Transportation Needs:     Lack of Transportation (Medical):  Lack of Transportation (Non-Medical):    Physical Activity:     Days of Exercise per Week:     Minutes of Exercise per Session:    Stress:     Feeling of Stress :    Social Connections:     Frequency of Communication with Friends and Family:     Frequency of Social Gatherings with Friends and Family:     Attends [employer] Services:     Active Member of Clubs or Organizations:     Attends Club or Organization Meetings:     Marital Status:    Intimate Partner Violence:     Fear of Current or Ex-Partner:     Emotionally Abused:     Physically Abused:     Sexually Abused:        Physical Examination:  /74   Pulse 61   Ht 5' 6\" (1.676 m)   Wt 195 lb (88.5 kg)   SpO2 (!) 81%   BMI 31.47 kg/m²   Physical Exam  Vitals reviewed. Constitutional:       Appearance: He is well-developed. Neck:      Vascular: No carotid bruit or JVD. Cardiovascular:      Rate and Rhythm: Normal rate and regular rhythm. Heart sounds: Normal heart sounds. No murmur heard. No friction rub. No gallop. Pulmonary:      Effort: Pulmonary effort is normal. No respiratory distress. Breath sounds: Normal breath sounds. No wheezing or rales. Abdominal:      General: There is no distension. Tenderness: There is no abdominal tenderness. Lymphadenopathy:      Cervical: No cervical adenopathy. Skin:     General: Skin is warm and dry. ASSESSMENT:     Diagnosis Orders   1. Severe pulmonary hypertension ([address]     2. Ischemic cardiomyopathy     3. ICD (implantable cardioverter-defibrillator) pocket hematoma, subsequent encounter     4. S/P CABG (coronary artery bypass graft)     5. Chronic systolic (congestive) heart failure (HCC)     6. Mixed hyperlipidemia     7. Essential hypertension     8. Long term current use of antiarrhythmic drug         PLAN:  No orders of the defined types were placed in this encounter. No orders of the defined types were placed in this encounter. 1. Continue present medications  2. Recommend increasing lisinopril to twice daily dosing  3. May take Bumex 3 times a week in addition to Lasix  4. Recommend follow-up assessment in 2 weeks    Return in about 2 weeks (around 8/26/2021) for return to Dr. Samuel Valladares only. Leeanne Hall MD 8/12/2021 10:55 AM CDT    [facility]      Thisdictation was generated by voice recognition computer software. Although all attempts are made to edit the dictation for accuracy, there may be errors in the transcription that are not intended.

## 2021-08-13 ENCOUNTER — OFFICE VISIT (OUTPATIENT)
Dept: UROLOGY | Age: 76
End: 2021-08-13
Payer: MEDICARE

## 2021-08-13 DIAGNOSIS — R97.20 ELEVATED PROSTATE SPECIFIC ANTIGEN (PSA): Primary | ICD-10-CM

## 2021-08-13 LAB
BILIRUBIN, POC: ABNORMAL
BLOOD URINE, POC: ABNORMAL
CLARITY, POC: CLEAR
COLOR, POC: YELLOW
GLUCOSE URINE, POC: ABNORMAL
KETONES, POC: ABNORMAL
LEUKOCYTE EST, POC: ABNORMAL
NITRITE, POC: ABNORMAL
PH, POC: 6.5
PROTEIN, POC: ABNORMAL
SPECIFIC GRAVITY, POC: 1.02
UROBILINOGEN, POC: 1

## 2021-08-13 PROCEDURE — 81003 URINALYSIS AUTO W/O SCOPE: CPT | Performed by: UROLOGY

## 2021-08-13 PROCEDURE — 4040F PNEUMOC VAC/ADMIN/RCVD: CPT | Performed by: UROLOGY

## 2021-08-13 PROCEDURE — G8427 DOCREV CUR MEDS BY ELIG CLIN: HCPCS | Performed by: UROLOGY

## 2021-08-13 PROCEDURE — 1123F ACP DISCUSS/DSCN MKR DOCD: CPT | Performed by: UROLOGY

## 2021-08-13 PROCEDURE — 1111F DSCHRG MED/CURRENT MED MERGE: CPT | Performed by: UROLOGY

## 2021-08-13 PROCEDURE — 99214 OFFICE O/P EST MOD 30 MIN: CPT | Performed by: UROLOGY

## 2021-08-13 PROCEDURE — G8417 CALC BMI ABV UP PARAM F/U: HCPCS | Performed by: UROLOGY

## 2021-08-13 PROCEDURE — 1036F TOBACCO NON-USER: CPT | Performed by: UROLOGY

## 2021-08-13 ASSESSMENT — ENCOUNTER SYMPTOMS
NAUSEA: 0
SORE THROAT: 0
EYE PAIN: 0
WHEEZING: 0
BACK PAIN: 0
VOMITING: 0
COUGH: 0

## 2021-08-13 NOTE — PROGRESS NOTES
Negrita Martinez is a 68 y.o. male who presents today   Chief Complaint   Patient presents with    Follow-up     I am here for my 2 week follow up for elevated psa. i had my psa redone for visit. Elevated PSA:  Patient is here today for history of an elevated PSA. His last several PSA values are as follows:  Lab Results   Component Value Date    PSA 6.69 (H) 08/12/2021   PSA  from 5/11/2021 was 7.2  PSA done on 4/7/2021 was 6.0  PSA done 9/27/2019 was 4.9  Overall the PSA level is: increased  Recent history of urinary tract infection/prostatitis? no  Previous prostate biopsy? no  Lower urinary tract symptoms: frequency and nocturia, 1 times per night      Past Medical History:   Diagnosis Date    Acute myocardial infarction of anterolateral wall, subsequent episode of care [facility]) 12/25/2011    AICD discharge     Allergies     Arthritis     CAD (coronary artery disease) of artery bypass graft     Cardiomyopathy, ischemic 5/25/2011    Coronary atherosclerosis of native coronary artery     Elevated PSA     Hyperlipidemia     Hyperlipidemia     Cholesterol management per Lise Sullivan M.D.    Hypertension     ICD (implantable cardiac defibrillator) 5/25/2011    Rapid atrial fibrillation (HCC)     Shortness of breath     Type II or unspecified type diabetes mellitus without mention of complication, not stated as uncontrolled        Past Surgical History:   Procedure Laterality Date    APPENDECTOMY      ARM SURGERY Left 10/14/2019    EXCISION LIPOMA LEFT ARM performed by Liseth Wallace MD at [address] [place] [address]  4/29/14  [facility]    2 vessel CAD.  EF 45%    CARDIAC PACEMAKER PLACEMENT      medtronic     CHOLECYSTECTOMY, LAPAROSCOPIC N/A 9/26/2016    CHOLECYSTECTOMY LAPAROSCOPIC performed by Reta Ormond, MD at [facility] GRAFT  01/03/2011    2V CABG (LIMA - LAD, SVG - PLM)    DIAGNOSTIC CARDIAC CATH LAB PROCEDURE  12/25/10    left heart cath, selective Inhale 2 puffs into the lungs as needed for Shortness of Breath   1    furosemide (LASIX) 40 MG tablet Take 40 mg by mouth daily   2    potassium chloride (KLOR-CON M) 20 MEQ extended release tablet Take 10 mEq by mouth daily   2    simvastatin (ZOCOR) 80 MG tablet Take 40 mg by mouth nightly       pantoprazole (PROTONIX) 40 MG tablet Take 40 mg by mouth daily.  mirtazapine (REMERON SOL-TAB) 30 MG disintegrating tablet Take 30 mg by mouth nightly        No current facility-administered medications for this visit. Allergies   Allergen Reactions    Ambien [Zolpidem]      Unknown      Lortab [Hydrocodone-Acetaminophen] Other (See Comments)     One tab daily caused bowel obstruction    Phenergan  [Promethazine Hcl] Rash       Social History     Socioeconomic History    Marital status:      Spouse name: None    Number of children: None    Years of education: None    Highest education level: None   Occupational History    None   Tobacco Use    Smoking status: Never Smoker    Smokeless tobacco: Former User     Types: Chew   Vaping Use    Vaping Use: Never used   Substance and Sexual Activity    Alcohol use: No     Alcohol/week: 0.0 standard drinks    Drug use: No    Sexual activity: None   Other Topics Concern    None   Social History Narrative    None     Social Determinants of Health     Financial Resource Strain:     Difficulty of Paying Living Expenses:    Food Insecurity:     Worried About Running Out of Food in the Last Year:     Ran Out of Food in the Last Year:    Transportation Needs:     Lack of Transportation (Medical):      Lack of Transportation (Non-Medical):    Physical Activity:     Days of Exercise per Week:     Minutes of Exercise per Session:    Stress:     Feeling of Stress :    Social Connections:     Frequency of Communication with Friends and Family:     Frequency of Social Gatherings with Friends and Family:     Attends [employer] Services:     Active Member of Clubs or Organizations:     Attends Club or Organization Meetings:     Marital Status:    Intimate Partner Violence:     Fear of Current or Ex-Partner:     Emotionally Abused:     Physically Abused:     Sexually Abused:        Family History   Adopted: Yes   Problem Relation Age of Onset    Diabetes Other     Heart Disease Other        REVIEW OF SYSTEMS:  Review of Systems   Constitutional: Negative for chills and fever. HENT: Negative for congestion and sore throat. Eyes: Negative for pain and visual disturbance. Respiratory: Negative for cough and wheezing. Cardiovascular: Negative for chest pain and palpitations. Gastrointestinal: Negative for nausea and vomiting. Endocrine: Negative for polyphagia and polyuria. Genitourinary: Negative for decreased urine volume, difficulty urinating, discharge, dysuria, enuresis, flank pain, frequency, genital sores, hematuria, penile pain, penile swelling, scrotal swelling, testicular pain and urgency. Musculoskeletal: Negative for back pain and neck pain. Skin: Negative for rash and wound. Allergic/Immunologic: Negative for environmental allergies and food allergies. Neurological: Negative for dizziness and headaches. Hematological: Negative for adenopathy. Does not bruise/bleed easily. Psychiatric/Behavioral: Negative for confusion and hallucinations. All other systems reviewed and are negative. PHYSICAL EXAM:  There were no vitals taken for this visit. Physical Exam  Constitutional:       General: He is not in acute distress. Appearance: Normal appearance. He is well-developed. HENT:      Head: Normocephalic and atraumatic. Nose: Nose normal.   Eyes:      General: No scleral icterus. Conjunctiva/sclera: Conjunctivae normal.      Pupils: Pupils are equal, round, and reactive to light. Neck:      Trachea: No tracheal deviation. Cardiovascular:      Rate and Rhythm: Normal rate and regular rhythm. Pulmonary:      Effort: Pulmonary effort is normal. No respiratory distress. Breath sounds: No stridor. Abdominal:      General: There is no distension. Palpations: Abdomen is soft. There is no mass. Tenderness: There is no abdominal tenderness. Musculoskeletal:         General: No tenderness. Normal range of motion. Cervical back: Normal range of motion and neck supple. Lymphadenopathy:      Cervical: No cervical adenopathy. Skin:     General: Skin is warm and dry. Findings: No erythema. Neurological:      Mental Status: He is alert and oriented to person, place, and time. Psychiatric:         Behavior: Behavior normal.         Judgment: Judgment normal.             DATA:    No results found for this visit on 08/13/21. Lab Results   Component Value Date    PSA 6.69 (H) 08/12/2021         1. Elevated prostate specific antigen (PSA)  We discussed pros and cons of proceeding with a biopsy. We also discussed given his comorbidities expectant management with monitoring PSA without a biopsy. He prefers the latter does not want to have a biopsy he does understand there is possibility he could have undiagnosed prostate cancer that could advance to a advanced state. He states given his age and his other health concerns he is not worried  - PSA, Total and Free; Future      Orders Placed This Encounter   Procedures    PSA, Total and Free     Prior to next visit in 6 months     Standing Status:   Future     Standing Expiration Date:   8/13/2022        Return in about 6 months (around 2/13/2022) for PSA prior to vext visit. All information inputted into the note by the MA to include chief complaint, past medical history, past surgical history, medications, allergies, social and family history and review of systems has been reviewed and updated as needed by me.     EMR Dragon/transcription disclaimer: Much of this documentt is electronic  transcription/translation of spoken language

## 2021-08-23 ENCOUNTER — OFFICE VISIT (OUTPATIENT)
Dept: CARDIOLOGY CLINIC | Age: 76
End: 2021-08-23
Payer: MEDICARE

## 2021-08-23 VITALS — WEIGHT: 195 LBS | BODY MASS INDEX: 31.47 KG/M2

## 2021-08-23 DIAGNOSIS — I10 ESSENTIAL HYPERTENSION: ICD-10-CM

## 2021-08-23 DIAGNOSIS — D69.6 THROMBOCYTOPENIA, UNSPECIFIED (HCC): ICD-10-CM

## 2021-08-23 DIAGNOSIS — E78.2 MIXED HYPERLIPIDEMIA: ICD-10-CM

## 2021-08-23 DIAGNOSIS — I25.10 ATHEROSCLEROSIS OF NATIVE CORONARY ARTERY OF NATIVE HEART WITHOUT ANGINA PECTORIS: ICD-10-CM

## 2021-08-23 DIAGNOSIS — Z95.1 S/P CABG (CORONARY ARTERY BYPASS GRAFT): ICD-10-CM

## 2021-08-23 DIAGNOSIS — I50.22 CHRONIC SYSTOLIC (CONGESTIVE) HEART FAILURE (HCC): ICD-10-CM

## 2021-08-23 DIAGNOSIS — I25.5 ISCHEMIC CARDIOMYOPATHY: Primary | ICD-10-CM

## 2021-08-23 DIAGNOSIS — T82.837D: ICD-10-CM

## 2021-08-23 PROCEDURE — 1123F ACP DISCUSS/DSCN MKR DOCD: CPT | Performed by: INTERNAL MEDICINE

## 2021-08-23 PROCEDURE — G8417 CALC BMI ABV UP PARAM F/U: HCPCS | Performed by: INTERNAL MEDICINE

## 2021-08-23 PROCEDURE — 1111F DSCHRG MED/CURRENT MED MERGE: CPT | Performed by: INTERNAL MEDICINE

## 2021-08-23 PROCEDURE — 4040F PNEUMOC VAC/ADMIN/RCVD: CPT | Performed by: INTERNAL MEDICINE

## 2021-08-23 PROCEDURE — 93282 PRGRMG EVAL IMPLANTABLE DFB: CPT | Performed by: INTERNAL MEDICINE

## 2021-08-23 PROCEDURE — G8428 CUR MEDS NOT DOCUMENT: HCPCS | Performed by: INTERNAL MEDICINE

## 2021-08-23 PROCEDURE — 99214 OFFICE O/P EST MOD 30 MIN: CPT | Performed by: INTERNAL MEDICINE

## 2021-08-23 PROCEDURE — 1036F TOBACCO NON-USER: CPT | Performed by: INTERNAL MEDICINE

## 2021-08-23 RX ORDER — METOLAZONE 2.5 MG/1
2.5 TABLET ORAL
Qty: 30 TABLET | Refills: 3 | Status: SHIPPED | OUTPATIENT
Start: 2021-08-23 | End: 2022-02-15

## 2021-08-23 ASSESSMENT — ENCOUNTER SYMPTOMS
NAUSEA: 0
VOMITING: 0
GASTROINTESTINAL NEGATIVE: 1
SHORTNESS OF BREATH: 0
RESPIRATORY NEGATIVE: 1
DIARRHEA: 0
EYES NEGATIVE: 1

## 2021-08-23 NOTE — PROGRESS NOTES
[facility] Progress Note                            Date:  8/23/2021  Patient: Paulette Muhammad  Age:  68 y.o., 1945      Reason for evaluation:         SUBJECTIVE:    Returns today for follow-up assessment states he is quite short of breath seems to be getting worse despite taking the medications as prescribed. Cannot walk more than a few feet. Some edema. Weight has not diminished on the adjusted diuretic dosage. Last chest x-ray 8/4/2021 no acute findings no infiltrate or pulmonary edema. OptiVol elevated somewhat. Review of Systems   Constitutional: Negative. Negative for chills, fever and unexpected weight change. HENT: Negative. Eyes: Negative. Respiratory: Negative. Negative for shortness of breath. Cardiovascular: Negative. Negative for chest pain. Gastrointestinal: Negative. Negative for diarrhea, nausea and vomiting. Endocrine: Negative. Genitourinary: Negative. Musculoskeletal: Negative. Skin: Negative. Neurological: Negative. All other systems reviewed and are negative. OBJECTIVE:     Wt 195 lb (88.5 kg)   BMI 31.47 kg/m²     Labs:   CBC: No results for input(s): WBC, HGB, HCT, PLT in the last 72 hours. BMP:No results for input(s): NA, K, CO2, BUN, CREATININE, LABGLOM, GLUCOSE in the last 72 hours. BNP: No results for input(s): BNP in the last 72 hours. PT/INR: No results for input(s): PROTIME, INR in the last 72 hours. APTT:No results for input(s): APTT in the last 72 hours. CARDIAC ENZYMES:No results for input(s): CKTOTAL, CKMB, CKMBINDEX, TROPONINI in the last 72 hours. FASTING LIPID PANEL:  Lab Results   Component Value Date    HDL 46 08/05/2021    LDLDIRECT 69 04/27/2014    LDLCALC 31 08/05/2021    TRIG 65 08/05/2021     LIVER PROFILE:No results for input(s): AST, ALT, LABALBU in the last 72 hours.         Past Medical History:   Diagnosis Date    Acute myocardial infarction of anterolateral wall, subsequent episode of care [facility]) 12/25/2011    AICD discharge     Allergies     Arthritis     CAD (coronary artery disease) of artery bypass graft     Cardiomyopathy, ischemic 5/25/2011    Coronary atherosclerosis of native coronary artery     Elevated PSA     Hyperlipidemia     Hyperlipidemia     Cholesterol management per Escobar Mcdermott M.D.    Hypertension     ICD (implantable cardiac defibrillator) 5/25/2011    Rapid atrial fibrillation (HCC)     Shortness of breath     Type II or unspecified type diabetes mellitus without mention of complication, not stated as uncontrolled      Past Surgical History:   Procedure Laterality Date    APPENDECTOMY      ARM SURGERY Left 10/14/2019    EXCISION LIPOMA LEFT ARM performed by Arelis Live MD at [address] [facility]  4/29/14  [facility]    2 vessel CAD.  EF 45%    CARDIAC PACEMAKER PLACEMENT      medtronic     CHOLECYSTECTOMY, LAPAROSCOPIC N/A 9/26/2016    CHOLECYSTECTOMY LAPAROSCOPIC performed by Chelly Wilde MD at [address] [facility] GRAFT  01/03/2011    2V CABG (LIMA - LAD, SVG - PLM)    DIAGNOSTIC CARDIAC CATH LAB PROCEDURE  12/25/10    left heart cath, selective coronary arteriography, left ventriculography, direct infarct stenting to the left anterior descending coronary artery, left iliac arteriography, right iliac arteriography via left common femoral artery     FINGER AMPUTATION      traumatic left index finger    HERNIA REPAIR      ventral    MIDDLE EAR SURGERY Right 1966    tumor removed, some hearing loss     PACEMAKER PLACEMENT      MEDTRONIC PACEMAKER/DEFIB    HI COLONOSCOPY W/BIOPSY SINGLE/MULTIPLE N/A 4/3/2017    Dr JOSE Herrera-diverticular disease, benign ulcerated inflammatory polyp demonstrating a granulation tissue response--1 yr recall    HI EGD TRANSORAL BIOPSY SINGLE/MULTIPLE N/A 4/3/2017    Dr Ricci Learn hiatal hernia, gastritis, chronic inflammation     Family History   Adopted: Yes   Problem Relation Age of Onset  Diabetes Other     Heart Disease Other      Allergies   Allergen Reactions    Ambien [Zolpidem]      Unknown      Lortab [Hydrocodone-Acetaminophen] Other (See Comments)     One tab daily caused bowel obstruction    Phenergan  [Promethazine Hcl] Rash     Current Outpatient Medications   Medication Sig Dispense Refill    lisinopril (PRINIVIL;ZESTRIL) 5 MG tablet Take 1 tablet by mouth 2 times daily 60 tablet 3    Multiple Vitamins-Minerals (THERAPEUTIC MULTIVITAMIN-MINERALS) tablet Take 1 tablet by mouth daily      acetaminophen (TYLENOL) 325 MG tablet Take 650 mg by mouth every 6 hours as needed for Pain      aspirin EC 81 MG EC tablet Take 81 mg by mouth daily      nitroGLYCERIN (NITROSTAT) 0.4 MG SL tablet Place 1 tablet under the tongue every 5 minutes as needed for Chest pain 25 tablet 3    blood glucose test strips (EXACTECH TEST) strip USE 1 STRIP FOR BLOOD TEST TWICE A DAY ALTERNATING TIMES EACH DAY *STABLE INSULIN THERAPY*     DEC 30,2019      sotalol (BETAPACE) 80 MG tablet Take 80 mg by mouth 2 times daily       budesonide-formoterol (SYMBICORT) 160-4.5 MCG/ACT AERO Inhale 2 puffs into the lungs 2 times daily as needed       bumetanide (BUMEX) 2 MG tablet Take 2 mg by mouth Twice a Week Indications: Saturday and Tuesday       escitalopram (LEXAPRO) 10 MG tablet Take 10 mg by mouth daily      metFORMIN (GLUCOPHAGE) 500 MG tablet Take 500 mg by mouth 2 times daily (with meals)       PROAIR  (90 Base) MCG/ACT inhaler Inhale 2 puffs into the lungs as needed for Shortness of Breath   1    furosemide (LASIX) 40 MG tablet Take 40 mg by mouth daily   2    potassium chloride (KLOR-CON M) 20 MEQ extended release tablet Take 10 mEq by mouth daily   2    simvastatin (ZOCOR) 80 MG tablet Take 40 mg by mouth nightly       pantoprazole (PROTONIX) 40 MG tablet Take 40 mg by mouth daily.       mirtazapine (REMERON SOL-TAB) 30 MG disintegrating tablet Take 30 mg by mouth nightly        No current facility-administered medications for this visit. Social History     Socioeconomic History    Marital status:      Spouse name: Not on file    Number of children: Not on file    Years of education: Not on file    Highest education level: Not on file   Occupational History    Not on file   Tobacco Use    Smoking status: Never Smoker    Smokeless tobacco: Former User     Types: Chew   Vaping Use    Vaping Use: Never used   Substance and Sexual Activity    Alcohol use: No     Alcohol/week: 0.0 standard drinks    Drug use: No    Sexual activity: Not on file   Other Topics Concern    Not on file   Social History Narrative    Not on file     Social Determinants of Health     Financial Resource Strain:     Difficulty of Paying Living Expenses:    Food Insecurity:     Worried About [address] [facility] in the Last Year:     [employer] in the Last Year:    Transportation Needs:     Lack of Transportation (Medical):  Lack of Transportation (Non-Medical):    Physical Activity:     Days of Exercise per Week:     Minutes of Exercise per Session:    Stress:     Feeling of Stress :    Social Connections:     Frequency of Communication with Friends and Family:     Frequency of Social Gatherings with Friends and Family:     Attends Jew Services:     Active Member of Clubs or Organizations:     Attends Club or Organization Meetings:     Marital Status:    Intimate Partner Violence:     Fear of Current or Ex-Partner:     Emotionally Abused:     Physically Abused:     Sexually Abused:        Physical Examination:  Wt 195 lb (88.5 kg)   BMI 31.47 kg/m²   Physical Exam  Vitals reviewed. Constitutional:       Appearance: He is well-developed. Neck:      Vascular: No carotid bruit or JVD. Cardiovascular:      Rate and Rhythm: Normal rate and regular rhythm. Heart sounds: Normal heart sounds. No murmur heard. No friction rub. No gallop.     Pulmonary:      Effort: Pulmonary effort is normal. No respiratory distress. Breath sounds: Normal breath sounds. No wheezing or rales. Abdominal:      General: There is no distension. Tenderness: There is no abdominal tenderness. Lymphadenopathy:      Cervical: No cervical adenopathy. Skin:     General: Skin is warm and dry. ASSESSMENT:     Diagnosis Orders   1. Ischemic cardiomyopathy  CBC    BNP    Basic Metabolic Panel   2. Chronic systolic (congestive) heart failure (HCC)  CBC    BNP    Basic Metabolic Panel   3. Atherosclerosis of native coronary artery of native heart without angina pectoris  CBC    BNP    Basic Metabolic Panel   4. S/P CABG (coronary artery bypass graft)  CBC    BNP    Basic Metabolic Panel   5. Essential hypertension  CBC    BNP    Basic Metabolic Panel   6. Mixed hyperlipidemia  CBC    BNP    Basic Metabolic Panel   7. ICD (implantable cardioverter-defibrillator) pocket hematoma, subsequent encounter  CBC    BNP    Basic Metabolic Panel   8. Thrombocytopenia, unspecified         PLAN:  Orders Placed This Encounter   Procedures    CBC    BNP    Basic Metabolic Panel     No orders of the defined types were placed in this encounter. 1. Continue present medications  2. Recommend follow-up assessment in 1 month    Return in about 4 weeks (around 9/20/2021) for return to Dr. Jennifer Anderson only. Ariel Mosley MD 8/23/2021 1:07 PM CDT    [id number] [facility]      Thisdictation was generated by voice recognition computer software. Although all attempts are made to edit the dictation for accuracy, there may be errors in the transcription that are not intended.

## 2021-08-23 NOTE — PROGRESS NOTES
ICD interrogated  Presenting rhythm: VS,  <0.1 %  Battery status: 10.5 years  Lead status: lead impedance within range and stable  Sensing:  R waves 6.3 mV  Thresholds:  Ventricular 1.000 V @ 0.4ms  Observations:  None  Reprogramming for sensitivity and threshold testing  Next [facility] home check scheduled for 11/23/21

## 2021-09-08 ENCOUNTER — OFFICE VISIT (OUTPATIENT)
Dept: PULMONOLOGY | Age: 76
End: 2021-09-08
Payer: MEDICARE

## 2021-09-08 VITALS
OXYGEN SATURATION: 90 % | HEART RATE: 73 BPM | HEIGHT: 66 IN | RESPIRATION RATE: 18 BRPM | WEIGHT: 178 LBS | TEMPERATURE: 96.1 F | DIASTOLIC BLOOD PRESSURE: 82 MMHG | BODY MASS INDEX: 28.61 KG/M2 | SYSTOLIC BLOOD PRESSURE: 132 MMHG

## 2021-09-08 DIAGNOSIS — R06.09 DOE (DYSPNEA ON EXERTION): ICD-10-CM

## 2021-09-08 DIAGNOSIS — J96.11 CHRONIC RESPIRATORY FAILURE WITH HYPOXIA (HCC): Primary | ICD-10-CM

## 2021-09-08 DIAGNOSIS — I50.22 CHRONIC SYSTOLIC (CONGESTIVE) HEART FAILURE (HCC): ICD-10-CM

## 2021-09-08 DIAGNOSIS — J98.4 RESTRICTIVE LUNG DISEASE: ICD-10-CM

## 2021-09-08 PROCEDURE — 1036F TOBACCO NON-USER: CPT | Performed by: INTERNAL MEDICINE

## 2021-09-08 PROCEDURE — 1123F ACP DISCUSS/DSCN MKR DOCD: CPT | Performed by: INTERNAL MEDICINE

## 2021-09-08 PROCEDURE — G8427 DOCREV CUR MEDS BY ELIG CLIN: HCPCS | Performed by: INTERNAL MEDICINE

## 2021-09-08 PROCEDURE — G8417 CALC BMI ABV UP PARAM F/U: HCPCS | Performed by: INTERNAL MEDICINE

## 2021-09-08 PROCEDURE — 4040F PNEUMOC VAC/ADMIN/RCVD: CPT | Performed by: INTERNAL MEDICINE

## 2021-09-08 PROCEDURE — 99204 OFFICE O/P NEW MOD 45 MIN: CPT | Performed by: INTERNAL MEDICINE

## 2021-09-08 ASSESSMENT — ENCOUNTER SYMPTOMS
COUGH: 0
CHEST TIGHTNESS: 1
ABDOMINAL DISTENTION: 0
WHEEZING: 0
ABDOMINAL PAIN: 0
RHINORRHEA: 0
ANAL BLEEDING: 0
APNEA: 0
BACK PAIN: 0
SHORTNESS OF BREATH: 1

## 2021-09-08 NOTE — PROGRESS NOTES
AICD.      Prior to Visit Medications    Medication Sig Taking?  Authorizing Provider   OXYGEN Inhale 3-4 L into the lungs Yes Historical Provider, MD   metOLazone (ZAROXOLYN) 2.5 MG tablet Take 1 tablet by mouth Twice a Week Take 1 tablet by mouth on Mondays and Thursdays ONLY Yes Kiesha Mir MD   lisinopril (PRINIVIL;ZESTRIL) 5 MG tablet Take 1 tablet by mouth 2 times daily Yes Kiesha Mir MD   Multiple Vitamins-Minerals (THERAPEUTIC MULTIVITAMIN-MINERALS) tablet Take 1 tablet by mouth daily Yes Historical Provider, MD   acetaminophen (TYLENOL) 325 MG tablet Take 650 mg by mouth every 6 hours as needed for Pain Yes Historical Provider, MD   aspirin EC 81 MG EC tablet Take 81 mg by mouth daily Yes Historical Provider, MD   nitroGLYCERIN (NITROSTAT) 0.4 MG SL tablet Place 1 tablet under the tongue every 5 minutes as needed for Chest pain Yes Kiesha Mir MD   blood glucose test strips (EXACTECH TEST) strip USE 1 STRIP FOR BLOOD TEST TWICE A DAY ALTERNATING TIMES EACH DAY *STABLE INSULIN THERAPY*     DEC 18,4975 Yes Historical Provider, MD   sotalol (BETAPACE) 80 MG tablet Take 80 mg by mouth 2 times daily  Yes Historical Provider, MD   budesonide-formoterol (SYMBICORT) 160-4.5 MCG/ACT AERO Inhale 2 puffs into the lungs 2 times daily as needed  Yes Historical Provider, MD   bumetanide (BUMEX) 2 MG tablet Take 2 mg by mouth Twice a Week Indications: Saturday and Tuesday  Yes Historical Provider, MD   escitalopram (LEXAPRO) 10 MG tablet Take 10 mg by mouth daily Yes Historical Provider, MD   metFORMIN (GLUCOPHAGE) 500 MG tablet Take 500 mg by mouth 2 times daily (with meals)  Yes Historical Provider, MD   PROAIR  (90 Base) MCG/ACT inhaler Inhale 2 puffs into the lungs as needed for Shortness of Breath  Yes Historical Provider, MD   furosemide (LASIX) 40 MG tablet Take 40 mg by mouth daily  Yes Historical Provider, MD   potassium chloride (KLOR-CON M) 20 MEQ extended release tablet Take 10 mEq by mouth daily  Yes Historical Provider, MD   simvastatin (ZOCOR) 80 MG tablet Take 40 mg by mouth nightly  Yes Historical Provider, MD   pantoprazole (PROTONIX) 40 MG tablet Take 40 mg by mouth daily. Yes Historical Provider, MD   mirtazapine (REMERON SOL-TAB) 30 MG disintegrating tablet Take 30 mg by mouth nightly  Yes Historical Provider, MD        Review of Systems   Constitutional: Negative for activity change, appetite change, chills, diaphoresis and fatigue. HENT: Negative for congestion, dental problem, drooling, ear discharge, postnasal drip and rhinorrhea. Eyes: Negative for visual disturbance. Respiratory: Positive for chest tightness and shortness of breath. Negative for apnea, cough and wheezing. Gastrointestinal: Negative for abdominal distention, abdominal pain and anal bleeding. Endocrine: Negative for cold intolerance, heat intolerance and polydipsia. Genitourinary: Negative for difficulty urinating, dysuria, enuresis and flank pain. Musculoskeletal: Negative for arthralgias, back pain and gait problem. Allergic/Immunologic: Negative for environmental allergies. Neurological: Negative for dizziness, facial asymmetry, light-headedness and headaches. Vitals:    09/08/21 1125   BP: 132/82   Pulse: 73   Resp: 18   Temp: 96.1 °F (35.6 °C)   SpO2: 90%     Physical Exam  Vitals reviewed. Constitutional:       Appearance: Normal appearance. HENT:      Head: Normocephalic and atraumatic. Nose: Nose normal.   Eyes:      Extraocular Movements: Extraocular movements intact. Conjunctiva/sclera: Conjunctivae normal.   Cardiovascular:      Rate and Rhythm: Normal rate and regular rhythm. Heart sounds: No murmur heard. No friction rub. Pulmonary:      Effort: Pulmonary effort is normal. No respiratory distress. Breath sounds: Normal breath sounds. No stridor. No wheezing, rhonchi or rales. Abdominal:      General: There is no distension. Palpations: There is no mass. Tenderness: There is no abdominal tenderness. There is no guarding or rebound. Musculoskeletal:      Cervical back: Normal range of motion and neck supple. Neurological:      Mental Status: He is alert and oriented to person, place, and time. This note was generated used a voice recognition software. Errors in voice recognition may have occurred. An electronic signature was used to authenticate this note.     --Cristina Copeland MD

## 2021-09-20 ENCOUNTER — OFFICE VISIT (OUTPATIENT)
Dept: CARDIOLOGY CLINIC | Age: 76
End: 2021-09-20
Payer: MEDICARE

## 2021-09-20 VITALS
HEART RATE: 59 BPM | SYSTOLIC BLOOD PRESSURE: 112 MMHG | DIASTOLIC BLOOD PRESSURE: 78 MMHG | BODY MASS INDEX: 27.97 KG/M2 | HEIGHT: 66 IN | WEIGHT: 174 LBS

## 2021-09-20 DIAGNOSIS — I50.22 CHRONIC SYSTOLIC (CONGESTIVE) HEART FAILURE (HCC): ICD-10-CM

## 2021-09-20 DIAGNOSIS — I25.5 ISCHEMIC CARDIOMYOPATHY: ICD-10-CM

## 2021-09-20 DIAGNOSIS — E78.2 MIXED HYPERLIPIDEMIA: ICD-10-CM

## 2021-09-20 DIAGNOSIS — R06.09 DOE (DYSPNEA ON EXERTION): ICD-10-CM

## 2021-09-20 DIAGNOSIS — I27.20 SEVERE PULMONARY HYPERTENSION (HCC): Primary | ICD-10-CM

## 2021-09-20 DIAGNOSIS — T82.837D: ICD-10-CM

## 2021-09-20 DIAGNOSIS — Z95.1 S/P CABG (CORONARY ARTERY BYPASS GRAFT): ICD-10-CM

## 2021-09-20 DIAGNOSIS — I10 ESSENTIAL HYPERTENSION: ICD-10-CM

## 2021-09-20 PROCEDURE — 1123F ACP DISCUSS/DSCN MKR DOCD: CPT | Performed by: INTERNAL MEDICINE

## 2021-09-20 PROCEDURE — 4040F PNEUMOC VAC/ADMIN/RCVD: CPT | Performed by: INTERNAL MEDICINE

## 2021-09-20 PROCEDURE — G8417 CALC BMI ABV UP PARAM F/U: HCPCS | Performed by: INTERNAL MEDICINE

## 2021-09-20 PROCEDURE — 1036F TOBACCO NON-USER: CPT | Performed by: INTERNAL MEDICINE

## 2021-09-20 PROCEDURE — 99213 OFFICE O/P EST LOW 20 MIN: CPT | Performed by: INTERNAL MEDICINE

## 2021-09-20 PROCEDURE — G8427 DOCREV CUR MEDS BY ELIG CLIN: HCPCS | Performed by: INTERNAL MEDICINE

## 2021-09-20 ASSESSMENT — ENCOUNTER SYMPTOMS
DIARRHEA: 0
RESPIRATORY NEGATIVE: 1
VOMITING: 0
EYES NEGATIVE: 1
NAUSEA: 0
GASTROINTESTINAL NEGATIVE: 1
SHORTNESS OF BREATH: 0

## 2021-09-20 NOTE — PROGRESS NOTES
ICD interrogated  Presenting rhythm: VS,  <0.1%  Battery status: 10.4 years  Lead status: lead impedance within range and stable  Sensing:  R waves 5.9 mV  Thresholds:  Ventricular 0.875 V @ 0.4ms  Observations:  none  Reprogramming for sensitivity and threshold testing  Next [facility] home check scheduled for 11/23/21

## 2021-09-20 NOTE — PROGRESS NOTES
[facility] Progress Note                            Date:  9/20/2021  Patient: Spencer Camejo  Age:  68 y.o., 1945      Reason for evaluation:         SUBJECTIVE:    Returns today follow-up assessment overall doing well. Denies anginal chest pain dyspnea improved edema improved since we started him on metolazone twice weekly on last visit. No other complaints or issues reported. Blood pressure 112/78 heart 59. Echocardiogram 8/4/2021 EF 35% dilated right ventricle global right ventricular function diminished mild TR pulmonary hypertension. Review of Systems   Constitutional: Negative. Negative for chills, fever and unexpected weight change. HENT: Negative. Eyes: Negative. Respiratory: Negative. Negative for shortness of breath. Cardiovascular: Negative. Negative for chest pain. Gastrointestinal: Negative. Negative for diarrhea, nausea and vomiting. Endocrine: Negative. Genitourinary: Negative. Musculoskeletal: Negative. Skin: Negative. Neurological: Negative. All other systems reviewed and are negative. OBJECTIVE:     /78   Pulse 59   Ht 5' 6\" (1.676 m)   Wt 174 lb (78.9 kg)   BMI 28.08 kg/m²     Labs:   CBC: No results for input(s): WBC, HGB, HCT, PLT in the last 72 hours. BMP:No results for input(s): NA, K, CO2, BUN, CREATININE, LABGLOM, GLUCOSE in the last 72 hours. BNP: No results for input(s): BNP in the last 72 hours. PT/INR: No results for input(s): PROTIME, INR in the last 72 hours. APTT:No results for input(s): APTT in the last 72 hours. CARDIAC ENZYMES:No results for input(s): CKTOTAL, CKMB, CKMBINDEX, TROPONINI in the last 72 hours. FASTING LIPID PANEL:  Lab Results   Component Value Date    HDL 46 08/05/2021    LDLDIRECT 69 04/27/2014    LDLCALC 31 08/05/2021    TRIG 65 08/05/2021     LIVER PROFILE:No results for input(s): AST, ALT, LABALBU in the last 72 hours.         Past Medical History:   Diagnosis Date    Acute myocardial infarction of anterolateral wall, subsequent episode of care [facility]) 12/25/2011    AICD discharge     Allergies     Arthritis     CAD (coronary artery disease) of artery bypass graft     Cardiomyopathy, ischemic 5/25/2011    Coronary atherosclerosis of native coronary artery     Elevated PSA     Hyperlipidemia     Hyperlipidemia     Cholesterol management per Kimberley Orozco M.D.    Hypertension     ICD (implantable cardiac defibrillator) 5/25/2011    Rapid atrial fibrillation (HCC)     Shortness of breath     Type II or unspecified type diabetes mellitus without mention of complication, not stated as uncontrolled      Past Surgical History:   Procedure Laterality Date    APPENDECTOMY      ARM SURGERY Left 10/14/2019    EXCISION LIPOMA LEFT ARM performed by Chloe Hatch MD at [address] [facility]  4/29/14  [place]    2 vessel CAD.  EF 45%    CARDIAC PACEMAKER PLACEMENT      medtronic     CHOLECYSTECTOMY, LAPAROSCOPIC N/A 9/26/2016    CHOLECYSTECTOMY LAPAROSCOPIC performed by Arelis Prasad MD at [address] [facility] GRAFT  01/03/2011    2V CABG (LIMA - LAD, SVG - PLM)    DIAGNOSTIC CARDIAC CATH LAB PROCEDURE  12/25/10    left heart cath, selective coronary arteriography, left ventriculography, direct infarct stenting to the left anterior descending coronary artery, left iliac arteriography, right iliac arteriography via left common femoral artery     FINGER AMPUTATION      traumatic left index finger    HERNIA REPAIR      ventral    MIDDLE EAR SURGERY Right 1966    tumor removed, some hearing loss     PACEMAKER PLACEMENT      [employer] PACEMAKER/DEFIB    ND COLONOSCOPY W/BIOPSY SINGLE/MULTIPLE N/A 4/3/2017    Dr JOSE Herrera-diverticular disease, benign ulcerated inflammatory polyp demonstrating a granulation tissue response--1 yr recall    ND EGD TRANSORAL BIOPSY SINGLE/MULTIPLE N/A 4/3/2017    Dr Ace Ruth hiatal hernia, gastritis, chronic inflammation Family History   Adopted: Yes   Problem Relation Age of Onset    Diabetes Other     Heart Disease Other      Allergies   Allergen Reactions    Ambien [Zolpidem]      Unknown      Lortab [Hydrocodone-Acetaminophen] Other (See Comments)     One tab daily caused bowel obstruction    Phenergan  [Promethazine Hcl] Rash     Current Outpatient Medications   Medication Sig Dispense Refill    OXYGEN Inhale 3-4 L into the lungs      metOLazone (ZAROXOLYN) 2.5 MG tablet Take 1 tablet by mouth Twice a Week Take 1 tablet by mouth on Mondays and Thursdays ONLY 30 tablet 3    lisinopril (PRINIVIL;ZESTRIL) 5 MG tablet Take 1 tablet by mouth 2 times daily 60 tablet 3    Multiple Vitamins-Minerals (THERAPEUTIC MULTIVITAMIN-MINERALS) tablet Take 1 tablet by mouth daily      acetaminophen (TYLENOL) 325 MG tablet Take 650 mg by mouth every 6 hours as needed for Pain      aspirin EC 81 MG EC tablet Take 81 mg by mouth daily      blood glucose test strips (EXACTECH TEST) strip USE 1 STRIP FOR BLOOD TEST TWICE A DAY ALTERNATING TIMES EACH DAY *STABLE INSULIN THERAPY*     DEC 30,2019      sotalol (BETAPACE) 80 MG tablet Take 80 mg by mouth 2 times daily       budesonide-formoterol (SYMBICORT) 160-4.5 MCG/ACT AERO Inhale 2 puffs into the lungs 2 times daily as needed       bumetanide (BUMEX) 2 MG tablet Take 2 mg by mouth three times a week Indications: Saturday and Tuesday Monday, Wednesday and Friday.       escitalopram (LEXAPRO) 10 MG tablet Take 10 mg by mouth daily      metFORMIN (GLUCOPHAGE) 500 MG tablet Take 500 mg by mouth 2 times daily (with meals)       PROAIR  (90 Base) MCG/ACT inhaler Inhale 2 puffs into the lungs as needed for Shortness of Breath   1    furosemide (LASIX) 40 MG tablet Take 40 mg by mouth daily   2    potassium chloride (KLOR-CON M) 20 MEQ extended release tablet Take 10 mEq by mouth daily   2    simvastatin (ZOCOR) 80 MG tablet Take 40 mg by mouth nightly       pantoprazole (PROTONIX) 40 MG tablet Take 40 mg by mouth daily.  mirtazapine (REMERON SOL-TAB) 30 MG disintegrating tablet Take 30 mg by mouth nightly       nitroGLYCERIN (NITROSTAT) 0.4 MG SL tablet Place 1 tablet under the tongue every 5 minutes as needed for Chest pain (Patient not taking: Reported on 9/20/2021) 25 tablet 3     No current facility-administered medications for this visit. Social History     Socioeconomic History    Marital status:      Spouse name: Not on file    Number of children: Not on file    Years of education: Not on file    Highest education level: Not on file   Occupational History    Not on file   Tobacco Use    Smoking status: Never Smoker    Smokeless tobacco: Former User     Types: Chew   Vaping Use    Vaping Use: Never used   Substance and Sexual Activity    Alcohol use: No     Alcohol/week: 0.0 standard drinks    Drug use: No    Sexual activity: Not on file   Other Topics Concern    Not on file   Social History Narrative    Not on file     Social Determinants of Health     Financial Resource Strain:     Difficulty of Paying Living Expenses:    Food Insecurity:     Worried About [email or web address] in the Last Year:     [address] [place] in the Last Year:    Transportation Needs:     Lack of Transportation (Medical):      Lack of Transportation (Non-Medical):    Physical Activity:     Days of Exercise per Week:     Minutes of Exercise per Session:    Stress:     Feeling of Stress :    Social Connections:     Frequency of Communication with Friends and Family:     Frequency of Social Gatherings with Friends and Family:     Attends Anglican Services:     Active Member of Clubs or Organizations:     Attends Club or Organization Meetings:     Marital Status:    Intimate Partner Violence:     Fear of Current or Ex-Partner:     Emotionally Abused:     Physically Abused:     Sexually Abused:        Physical Examination:  /78   Pulse 59   Ht 5' 6\" (1.676 m)   Wt 174 lb (78.9 kg)   BMI 28.08 kg/m²   Physical Exam  Vitals reviewed. Constitutional:       Appearance: He is well-developed. Neck:      Vascular: No carotid bruit or JVD. Cardiovascular:      Rate and Rhythm: Normal rate and regular rhythm. Heart sounds: Normal heart sounds. No murmur heard. No friction rub. No gallop. Pulmonary:      Effort: Pulmonary effort is normal. No respiratory distress. Breath sounds: Normal breath sounds. No wheezing or rales. Abdominal:      General: There is no distension. Tenderness: There is no abdominal tenderness. Lymphadenopathy:      Cervical: No cervical adenopathy. Skin:     General: Skin is warm and dry. ASSESSMENT:     Diagnosis Orders   1. Severe pulmonary hypertension (HCC)  Basic Metabolic Panel   2. Chronic systolic (congestive) heart failure (HCC)  Basic Metabolic Panel   3. Mixed hyperlipidemia  Basic Metabolic Panel   4. Essential hypertension  Basic Metabolic Panel   5. ICD (implantable cardioverter-defibrillator) pocket hematoma, subsequent encounter  Basic Metabolic Panel   6. PARKS (dyspnea on exertion)  Basic Metabolic Panel   7. S/P CABG (coronary artery bypass graft)  Basic Metabolic Panel   8. Ischemic cardiomyopathy  Basic Metabolic Panel       PLAN:  Orders Placed This Encounter   Procedures    Basic Metabolic Panel     No orders of the defined types were placed in this encounter. 1. Continue present medications  2. Laboratory studies as ordered  3. Recommend follow-up assessment in 1 month    Return in about 4 weeks (around 10/18/2021) for return to Dr. Vivi Rosario only. Paz Tidwell MD 9/20/2021 2:07 PM CDT    [facility]      Thisdictation was generated by voice recognition computer software. Although all attempts are made to edit the dictation for accuracy, there may be errors in the transcription that are not intended.

## 2021-10-13 ENCOUNTER — HOSPITAL ENCOUNTER (OUTPATIENT)
Dept: CT IMAGING | Age: 76
Discharge: HOME | End: 2021-10-13
Payer: MEDICARE

## 2021-10-13 DIAGNOSIS — J98.4 RESTRICTIVE LUNG DISEASE: ICD-10-CM

## 2021-10-13 PROCEDURE — 71250 CT THORAX DX C-: CPT

## 2021-10-18 ENCOUNTER — OFFICE VISIT (OUTPATIENT)
Dept: CARDIOLOGY CLINIC | Age: 76
End: 2021-10-18
Payer: MEDICARE

## 2021-10-18 VITALS
SYSTOLIC BLOOD PRESSURE: 102 MMHG | WEIGHT: 176 LBS | HEIGHT: 66 IN | BODY MASS INDEX: 28.28 KG/M2 | DIASTOLIC BLOOD PRESSURE: 58 MMHG | HEART RATE: 56 BPM

## 2021-10-18 DIAGNOSIS — I10 ESSENTIAL HYPERTENSION: ICD-10-CM

## 2021-10-18 DIAGNOSIS — I25.10 ATHEROSCLEROSIS OF NATIVE CORONARY ARTERY OF NATIVE HEART WITHOUT ANGINA PECTORIS: ICD-10-CM

## 2021-10-18 DIAGNOSIS — I25.5 ISCHEMIC CARDIOMYOPATHY: ICD-10-CM

## 2021-10-18 DIAGNOSIS — T82.837D: ICD-10-CM

## 2021-10-18 DIAGNOSIS — Z45.02 IMPLANTABLE CARDIOVERTER-DEFIBRILLATOR (ICD) GENERATOR END OF LIFE: ICD-10-CM

## 2021-10-18 DIAGNOSIS — Z95.810 SINGLE IMPLANTABLE CARDIOVERTER-DEFIBRILLATOR (ICD) IN SITU: ICD-10-CM

## 2021-10-18 DIAGNOSIS — I50.22 CHRONIC SYSTOLIC (CONGESTIVE) HEART FAILURE (HCC): ICD-10-CM

## 2021-10-18 DIAGNOSIS — I27.20 SEVERE PULMONARY HYPERTENSION (HCC): Primary | ICD-10-CM

## 2021-10-18 DIAGNOSIS — Z95.1 S/P CABG (CORONARY ARTERY BYPASS GRAFT): ICD-10-CM

## 2021-10-18 DIAGNOSIS — R06.09 DOE (DYSPNEA ON EXERTION): ICD-10-CM

## 2021-10-18 DIAGNOSIS — I27.20 SEVERE PULMONARY HYPERTENSION (HCC): ICD-10-CM

## 2021-10-18 DIAGNOSIS — J96.11 CHRONIC RESPIRATORY FAILURE WITH HYPOXIA (HCC): ICD-10-CM

## 2021-10-18 DIAGNOSIS — E78.2 MIXED HYPERLIPIDEMIA: ICD-10-CM

## 2021-10-18 LAB
ANION GAP SERPL CALCULATED.3IONS-SCNC: 12 MMOL/L (ref 7–19)
BUN BLDV-MCNC: 48 MG/DL (ref 8–23)
CALCIUM SERPL-MCNC: 10 MG/DL (ref 8.8–10.2)
CHLORIDE BLD-SCNC: 99 MMOL/L (ref 98–111)
CO2: 29 MMOL/L (ref 22–29)
CREAT SERPL-MCNC: 1.6 MG/DL (ref 0.5–1.2)
GFR AFRICAN AMERICAN: 51
GFR NON-AFRICAN AMERICAN: 42
GLUCOSE BLD-MCNC: 99 MG/DL (ref 74–109)
POTASSIUM SERPL-SCNC: 4.8 MMOL/L (ref 3.5–5)
SODIUM BLD-SCNC: 140 MMOL/L (ref 136–145)

## 2021-10-18 PROCEDURE — 1036F TOBACCO NON-USER: CPT | Performed by: INTERNAL MEDICINE

## 2021-10-18 PROCEDURE — 99214 OFFICE O/P EST MOD 30 MIN: CPT | Performed by: INTERNAL MEDICINE

## 2021-10-18 PROCEDURE — G8427 DOCREV CUR MEDS BY ELIG CLIN: HCPCS | Performed by: INTERNAL MEDICINE

## 2021-10-18 PROCEDURE — 1123F ACP DISCUSS/DSCN MKR DOCD: CPT | Performed by: INTERNAL MEDICINE

## 2021-10-18 PROCEDURE — 4040F PNEUMOC VAC/ADMIN/RCVD: CPT | Performed by: INTERNAL MEDICINE

## 2021-10-18 PROCEDURE — G8484 FLU IMMUNIZE NO ADMIN: HCPCS | Performed by: INTERNAL MEDICINE

## 2021-10-18 PROCEDURE — G8417 CALC BMI ABV UP PARAM F/U: HCPCS | Performed by: INTERNAL MEDICINE

## 2021-10-18 ASSESSMENT — ENCOUNTER SYMPTOMS
GASTROINTESTINAL NEGATIVE: 1
VOMITING: 0
EYES NEGATIVE: 1
NAUSEA: 0
DIARRHEA: 0
SHORTNESS OF BREATH: 0
RESPIRATORY NEGATIVE: 1

## 2021-10-18 NOTE — PROGRESS NOTES
[facility] Progress Note                            Date:  10/19/2021  Patient: Stiven Dean  Age:  68 y.o., 1945      Reason for evaluation:         SUBJECTIVE:    Seen today follow-up assessment chronic systolic congestive heart failure hyperlipidemia severe pulmonary hypertension coronary artery disease ICD implant status post CABG. Has chronic edema presently on metolazone twice weekly Bumex 3 times weekly and Lasix 40 mg daily. Edema appears to be well controlled. He does have occasional dizziness and blood pressure today 102/58 heart 56. His primary care physician recently adjusted his lisinopril from 5 mg daily to 2.5 p.o. twice daily. No ICD shocks reported. No other complaints or issues reported. We will go ahead and check a CHEM 7 profile today has not had any recent lab work. Review of Systems   Constitutional: Negative. Negative for chills, fever and unexpected weight change. HENT: Negative. Eyes: Negative. Respiratory: Negative. Negative for shortness of breath. Cardiovascular: Negative. Negative for chest pain. Gastrointestinal: Negative. Negative for diarrhea, nausea and vomiting. Endocrine: Negative. Genitourinary: Negative. Musculoskeletal: Negative. Skin: Negative. Neurological: Negative. All other systems reviewed and are negative. OBJECTIVE:     BP (!) 102/58   Pulse 56   Ht 5' 6\" (1.676 m)   Wt 176 lb (79.8 kg)   BMI 28.41 kg/m²     Labs:   CBC: No results for input(s): WBC, HGB, HCT, PLT in the last 72 hours. BMP:  Recent Labs     10/18/21  1516      K 4.8   CO2 29   BUN 48*   CREATININE 1.6*   LABGLOM 42*   GLUCOSE 99     BNP: No results for input(s): BNP in the last 72 hours. PT/INR: No results for input(s): PROTIME, INR in the last 72 hours. APTT:No results for input(s): APTT in the last 72 hours.   CARDIAC ENZYMES:No results for input(s): CKTOTAL, CKMB, CKMBINDEX, TROPONINI in the last 72 COLONOSCOPY W/BIOPSY SINGLE/MULTIPLE N/A 4/3/2017    Dr JOSE Herrera-diverticular disease, benign ulcerated inflammatory polyp demonstrating a granulation tissue response--1 yr recall    SD EGD TRANSORAL BIOPSY SINGLE/MULTIPLE N/A 4/3/2017    Dr Neel Rivero hiatal hernia, gastritis, chronic inflammation     Family History   Adopted: Yes   Problem Relation Age of Onset    Diabetes Other     Heart Disease Other      Allergies   Allergen Reactions    Ambien [Zolpidem]      Unknown      Lortab [Hydrocodone-Acetaminophen] Other (See Comments)     One tab daily caused bowel obstruction    Phenergan  [Promethazine Hcl] Rash     Current Outpatient Medications   Medication Sig Dispense Refill    OXYGEN Inhale 3-4 L into the lungs      metOLazone (ZAROXOLYN) 2.5 MG tablet Take 1 tablet by mouth Twice a Week Take 1 tablet by mouth on Mondays and Thursdays ONLY 30 tablet 3    lisinopril (PRINIVIL;ZESTRIL) 5 MG tablet Take 1 tablet by mouth 2 times daily 60 tablet 3    Multiple Vitamins-Minerals (THERAPEUTIC MULTIVITAMIN-MINERALS) tablet Take 1 tablet by mouth daily      acetaminophen (TYLENOL) 325 MG tablet Take 650 mg by mouth every 6 hours as needed for Pain      aspirin EC 81 MG EC tablet Take 81 mg by mouth daily      nitroGLYCERIN (NITROSTAT) 0.4 MG SL tablet Place 1 tablet under the tongue every 5 minutes as needed for Chest pain 25 tablet 3    blood glucose test strips (EXACTECH TEST) strip USE 1 STRIP FOR BLOOD TEST TWICE A DAY ALTERNATING TIMES EACH DAY *STABLE INSULIN THERAPY*     DEC 30,2019      sotalol (BETAPACE) 80 MG tablet Take 80 mg by mouth 2 times daily       budesonide-formoterol (SYMBICORT) 160-4.5 MCG/ACT AERO Inhale 2 puffs into the lungs 2 times daily as needed       bumetanide (BUMEX) 2 MG tablet Take 2 mg by mouth three times a week Indications: Saturday and Tuesday Monday, Wednesday and Friday.       escitalopram (LEXAPRO) 10 MG tablet Take 10 mg by mouth daily      metFORMIN (GLUCOPHAGE) 500 MG tablet Take 500 mg by mouth 2 times daily (with meals)       PROAIR  (90 Base) MCG/ACT inhaler Inhale 2 puffs into the lungs as needed for Shortness of Breath   1    potassium chloride (KLOR-CON M) 20 MEQ extended release tablet Take 10 mEq by mouth daily   2    simvastatin (ZOCOR) 80 MG tablet Take 40 mg by mouth nightly       pantoprazole (PROTONIX) 40 MG tablet Take 40 mg by mouth daily.  mirtazapine (REMERON SOL-TAB) 30 MG disintegrating tablet Take 30 mg by mouth nightly       furosemide (LASIX) 40 MG tablet Take 40 mg by mouth daily   2     No current facility-administered medications for this visit. Social History     Socioeconomic History    Marital status:      Spouse name: Not on file    Number of children: Not on file    Years of education: Not on file    Highest education level: Not on file   Occupational History    Not on file   Tobacco Use    Smoking status: Never Smoker    Smokeless tobacco: Former User     Types: Chew   Vaping Use    Vaping Use: Never used   Substance and Sexual Activity    Alcohol use: No     Alcohol/week: 0.0 standard drinks    Drug use: No    Sexual activity: Not on file   Other Topics Concern    Not on file   Social History Narrative    Not on file     Social Determinants of Health     Financial Resource Strain:     Difficulty of Paying Living Expenses:    Food Insecurity:     Worried About [address] [facility] in the Last Year:     [address] in the Last Year:    Transportation Needs:     Lack of Transportation (Medical):      Lack of Transportation (Non-Medical):    Physical Activity:     Days of Exercise per Week:     Minutes of Exercise per Session:    Stress:     Feeling of Stress :    Social Connections:     Frequency of Communication with Friends and Family:     Frequency of Social Gatherings with Friends and Family:     Attends Catholic Services:     Active Member of Clubs or Organizations:     Attends Club or Organization Meetings:     Marital Status:    Intimate Partner Violence:     Fear of Current or Ex-Partner:     Emotionally Abused:     Physically Abused:     Sexually Abused:        Physical Examination:  BP (!) 102/58   Pulse 56   Ht 5' 6\" (1.676 m)   Wt 176 lb (79.8 kg)   BMI 28.41 kg/m²   Physical Exam  Vitals reviewed. Constitutional:       Appearance: He is well-developed. Neck:      Vascular: No carotid bruit or JVD. Cardiovascular:      Rate and Rhythm: Normal rate and regular rhythm. Heart sounds: Normal heart sounds. No murmur heard. No friction rub. No gallop. Pulmonary:      Effort: Pulmonary effort is normal. No respiratory distress. Breath sounds: Normal breath sounds. No wheezing or rales. Abdominal:      General: There is no distension. Tenderness: There is no abdominal tenderness. Lymphadenopathy:      Cervical: No cervical adenopathy. Skin:     General: Skin is warm and dry. ASSESSMENT:     Diagnosis Orders   1. Severe pulmonary hypertension (HCC)  Basic Metabolic Panel   2. Mixed hyperlipidemia  Basic Metabolic Panel   3. Ischemic cardiomyopathy  Basic Metabolic Panel   4. Essential hypertension  Basic Metabolic Panel   5. ICD (implantable cardioverter-defibrillator) pocket hematoma, subsequent encounter  Basic Metabolic Panel   6. Chronic respiratory failure with hypoxia (HCC)  Basic Metabolic Panel   7. Implantable cardioverter-defibrillator (ICD) generator end of life  Basic Metabolic Panel   8. PARKS (dyspnea on exertion)  Basic Metabolic Panel   9. S/P CABG (coronary artery bypass graft)  Basic Metabolic Panel   10. Single implantable cardioverter-defibrillator (ICD) in situ  Basic Metabolic Panel   11. Atherosclerosis of native coronary artery of native heart without angina pectoris  Basic Metabolic Panel   12.  Chronic systolic (congestive) heart failure (HCC)  Basic Metabolic Panel       PLAN:  Orders Placed This Encounter

## 2021-10-20 ENCOUNTER — OFFICE VISIT (OUTPATIENT)
Dept: PULMONOLOGY | Age: 76
End: 2021-10-20
Payer: MEDICARE

## 2021-10-20 VITALS
HEIGHT: 66 IN | SYSTOLIC BLOOD PRESSURE: 114 MMHG | HEART RATE: 56 BPM | TEMPERATURE: 97.6 F | OXYGEN SATURATION: 98 % | DIASTOLIC BLOOD PRESSURE: 68 MMHG | BODY MASS INDEX: 27.83 KG/M2 | WEIGHT: 173.2 LBS

## 2021-10-20 DIAGNOSIS — J96.11 CHRONIC RESPIRATORY FAILURE WITH HYPOXIA (HCC): ICD-10-CM

## 2021-10-20 DIAGNOSIS — R06.09 DOE (DYSPNEA ON EXERTION): ICD-10-CM

## 2021-10-20 DIAGNOSIS — I50.22 CHRONIC SYSTOLIC (CONGESTIVE) HEART FAILURE (HCC): ICD-10-CM

## 2021-10-20 DIAGNOSIS — J98.4 RESTRICTIVE LUNG DISEASE: Primary | ICD-10-CM

## 2021-10-20 PROCEDURE — G8484 FLU IMMUNIZE NO ADMIN: HCPCS | Performed by: INTERNAL MEDICINE

## 2021-10-20 PROCEDURE — 1036F TOBACCO NON-USER: CPT | Performed by: INTERNAL MEDICINE

## 2021-10-20 PROCEDURE — G8417 CALC BMI ABV UP PARAM F/U: HCPCS | Performed by: INTERNAL MEDICINE

## 2021-10-20 PROCEDURE — G8427 DOCREV CUR MEDS BY ELIG CLIN: HCPCS | Performed by: INTERNAL MEDICINE

## 2021-10-20 PROCEDURE — 4040F PNEUMOC VAC/ADMIN/RCVD: CPT | Performed by: INTERNAL MEDICINE

## 2021-10-20 PROCEDURE — 99214 OFFICE O/P EST MOD 30 MIN: CPT | Performed by: INTERNAL MEDICINE

## 2021-10-20 PROCEDURE — 1123F ACP DISCUSS/DSCN MKR DOCD: CPT | Performed by: INTERNAL MEDICINE

## 2021-10-20 RX ORDER — CETIRIZINE HYDROCHLORIDE 10 MG/1
TABLET ORAL
COMMUNITY

## 2021-10-20 ASSESSMENT — ENCOUNTER SYMPTOMS
ABDOMINAL DISTENTION: 0
SHORTNESS OF BREATH: 1
BACK PAIN: 0
RHINORRHEA: 0
ABDOMINAL PAIN: 0
CHEST TIGHTNESS: 0
COUGH: 0
ANAL BLEEDING: 0
APNEA: 0
WHEEZING: 0

## 2021-10-20 NOTE — PROGRESS NOTES
Pulmonary and Sleep Medicine    [id number] (:  1945) is a 68 y.o. male,Established patient, here for evaluation of the following chief complaint(s):  Follow-up (Pt here for CT results; PFT scheduled for 2021.)      ASSESSMENT/PLAN:  1. Restrictive lung disease  2. Chronic respiratory failure with hypoxia (HCC)  3. Chronic systolic (congestive) heart failure ([address] EF 35% on echo done in 2021  4. PARKS (dyspnea on exertion)        It is very likely that his hypoxia and respiratory symptoms were related to uncontrolled congestive heart failure and pulmonary edema. Most recent work-up does not show significant findings anatomically to correlate with restrictive lung disease. His pulmonary function study is pending. Will follow after the pulmonary function studies done. Discussed the use of oxygen. Samson Ty MD, [facility], [facility]    Return in about 2 months (around 2021). SUBJECTIVE/OBJECTIVE:  The patient is here for follow-up on his work-up for interstitial lung disease or autoimmune disorders. He had minimal elevation of rheumatoid factor which is nonspecific. CT of the chest was unremarkable. Pulmonary function studies pending. SACHIN and ESR are negative. He indicates that his breathing has improved significantly. Oxygen saturation is 98% on room air in the office. He says he is using his oxygen as needed at the house. Prior to Visit Medications    Medication Sig Taking? Authorizing Provider   cetirizine (ZYRTEC ALLERGY) 10 MG tablet Zyrtec 10 mg tablet   Take 1 tablet every day by oral route.  Yes Historical Provider, MD   OXYGEN Inhale 3-4 L into the lungs Yes Historical Provider, MD   metOLazone (ZAROXOLYN) 2.5 MG tablet Take 1 tablet by mouth Twice a Week Take 1 tablet by mouth on  and  ONLY Yes Raina Wilde MD   lisinopril (PRINIVIL;ZESTRIL) 5 MG tablet Take 1 tablet by mouth 2 times daily Yes Raina Wilde MD Multiple Vitamins-Minerals (THERAPEUTIC MULTIVITAMIN-MINERALS) tablet Take 1 tablet by mouth daily Yes Historical Provider, MD   acetaminophen (TYLENOL) 325 MG tablet Take 650 mg by mouth every 6 hours as needed for Pain Yes Historical Provider, MD   aspirin EC 81 MG EC tablet Take 81 mg by mouth daily Yes Historical Provider, MD   nitroGLYCERIN (NITROSTAT) 0.4 MG SL tablet Place 1 tablet under the tongue every 5 minutes as needed for Chest pain Yes Veronica Miranda MD   blood glucose test strips (EXACTECH TEST) strip USE 1 STRIP FOR BLOOD TEST TWICE A DAY ALTERNATING TIMES EACH DAY *STABLE INSULIN THERAPY*     DEC 54,5879 Yes Historical Provider, MD   sotalol (BETAPACE) 80 MG tablet Take 80 mg by mouth 2 times daily  Yes Historical Provider, MD   budesonide-formoterol (SYMBICORT) 160-4.5 MCG/ACT AERO Inhale 2 puffs into the lungs 2 times daily as needed  Yes Historical Provider, MD   bumetanide (BUMEX) 2 MG tablet Take 2 mg by mouth three times a week Indications: Saturday and Tuesday Monday, Wednesday and Friday. Yes Historical Provider, MD   escitalopram (LEXAPRO) 10 MG tablet Take 10 mg by mouth daily Yes Historical Provider, MD   metFORMIN (GLUCOPHAGE) 500 MG tablet Take 500 mg by mouth 2 times daily (with meals)  Yes Historical Provider, MD   PROAIR  (90 Base) MCG/ACT inhaler Inhale 2 puffs into the lungs as needed for Shortness of Breath  Yes Historical Provider, MD   furosemide (LASIX) 40 MG tablet Take 40 mg by mouth daily  Yes Historical Provider, MD   potassium chloride (KLOR-CON M) 20 MEQ extended release tablet Take 10 mEq by mouth daily  Yes Historical Provider, MD   simvastatin (ZOCOR) 80 MG tablet Take 40 mg by mouth nightly  Yes Historical Provider, MD   pantoprazole (PROTONIX) 40 MG tablet Take 40 mg by mouth daily.  Yes Historical Provider, MD   mirtazapine (REMERON SOL-TAB) 30 MG disintegrating tablet Take 30 mg by mouth nightly  Yes Historical Provider, MD        Review of Systems Constitutional: Negative for activity change, appetite change, chills, diaphoresis and fatigue. HENT: Negative for congestion, dental problem, drooling, ear discharge, postnasal drip and rhinorrhea. Eyes: Negative for visual disturbance. Respiratory: Positive for shortness of breath. Negative for apnea, cough, chest tightness and wheezing. Gastrointestinal: Negative for abdominal distention, abdominal pain and anal bleeding. Endocrine: Negative for cold intolerance, heat intolerance and polydipsia. Genitourinary: Negative for difficulty urinating, dysuria, enuresis and flank pain. Musculoskeletal: Negative for arthralgias, back pain and gait problem. Allergic/Immunologic: Negative for environmental allergies. Neurological: Negative for dizziness, facial asymmetry, light-headedness and headaches. Vitals:    10/20/21 1018   BP: 114/68   Pulse: 56   Temp: 97.6 °F (36.4 °C)   SpO2: 98%     Physical Exam  Vitals reviewed. Constitutional:       Appearance: Normal appearance. HENT:      Head: Normocephalic and atraumatic. Nose: Nose normal.   Eyes:      Extraocular Movements: Extraocular movements intact. Conjunctiva/sclera: Conjunctivae normal.   Cardiovascular:      Rate and Rhythm: Normal rate and regular rhythm. Heart sounds: No murmur heard. No friction rub. Pulmonary:      Effort: Pulmonary effort is normal. No respiratory distress. Breath sounds: Normal breath sounds. No stridor. No wheezing, rhonchi or rales. Abdominal:      General: There is no distension. Palpations: There is no mass. Tenderness: There is no abdominal tenderness. There is no guarding or rebound. Musculoskeletal:      Cervical back: Normal range of motion and neck supple. Neurological:      Mental Status: He is alert and oriented to person, place, and time. This note was generated used a voice recognition software. Errors in voice recognition may have occurred. An electronic signature was used to authenticate this note.     --Nuha Maya MD

## 2021-10-28 RX ORDER — FUROSEMIDE 40 MG/1
40 TABLET ORAL DAILY
Qty: 60 TABLET | Refills: 2 | Status: SHIPPED | OUTPATIENT
Start: 2021-10-28 | End: 2022-02-18

## 2021-11-23 DIAGNOSIS — T82.837D: Primary | ICD-10-CM

## 2021-11-23 DIAGNOSIS — I25.5 ISCHEMIC CARDIOMYOPATHY: ICD-10-CM

## 2021-11-23 PROCEDURE — 93296 REM INTERROG EVL PM/IDS: CPT | Performed by: INTERNAL MEDICINE

## 2021-11-23 PROCEDURE — 93295 DEV INTERROG REMOTE 1/2/MLT: CPT | Performed by: INTERNAL MEDICINE

## 2021-11-30 ENCOUNTER — HOSPITAL ENCOUNTER (OUTPATIENT)
Dept: PULMONOLOGY | Age: 76
Discharge: HOME | End: 2021-11-30
Payer: MEDICARE

## 2021-11-30 DIAGNOSIS — J98.4 RESTRICTIVE LUNG DISEASE: ICD-10-CM

## 2021-11-30 PROCEDURE — 94727 GAS DIL/WSHOT DETER LNG VOL: CPT

## 2021-11-30 PROCEDURE — 94060 EVALUATION OF WHEEZING: CPT | Performed by: INTERNAL MEDICINE

## 2021-11-30 PROCEDURE — 94729 DIFFUSING CAPACITY: CPT

## 2021-11-30 PROCEDURE — 94729 DIFFUSING CAPACITY: CPT | Performed by: INTERNAL MEDICINE

## 2021-11-30 PROCEDURE — 94060 EVALUATION OF WHEEZING: CPT

## 2021-11-30 PROCEDURE — 94726 PLETHYSMOGRAPHY LUNG VOLUMES: CPT | Performed by: INTERNAL MEDICINE

## 2021-11-30 PROCEDURE — 6360000002 HC RX W HCPCS: Performed by: INTERNAL MEDICINE

## 2021-11-30 RX ORDER — ALBUTEROL SULFATE 2.5 MG/3ML
2.5 SOLUTION RESPIRATORY (INHALATION) EVERY 6 HOURS PRN
Status: DISCONTINUED | OUTPATIENT
Start: 2021-11-30 | End: 2021-12-02 | Stop reason: HOSPADM

## 2021-11-30 RX ADMIN — ALBUTEROL SULFATE 2.5 MG: 2.5 SOLUTION RESPIRATORY (INHALATION) at 08:54

## 2021-11-30 NOTE — LETTER
Pulmonary Fuction Testing Lab  [address] [facility], [address] [place] [address]  [phone]  Fax [phone]   November 15, 2021    Dear Josie Cash,      This letter is to UPDATE you regarding your upcoming appointment on 11/30/2021 at 9 AM. Our current protocol states that if you have had a Covid vaccine and it has been at least two weeks since your last dose you will not be required to have a Covid test. Please bring your Covid card with you. If you are unvaccinated you must have your COVID-19 test on 11/29/2021 between 9 AM and 12 PM. You need to quarantine once you have done your Covid test until your appointment. We are required to have results back prior to your appointment. If your test is positive you will be called with those results. If you choose to have your Covid test done else where rather than at one of our [facility]) locations, you must have the printed test results in hand with you on the day of your appointment. Below are the addresses for the testing locations:    [address] [place] [address] Ave, [address]  Mondays, Wednesdays, and Fridays 9 AM- 12 PM    [place] Location   [facility]) Urgent Care next to [address] [place] [address]  Monday thru Friday 8 AM - 5 PM       Please do not use any bronchodilators (rescue inhalers, breathing treatments, ect.) 4 hours prior to test and no long acting respiratory medications 12 hours before test. These medications will cause test results to be inaccurate. On the day of your appointment you will come through the Main Entrance of the [facility] hospital in order to register at the [address] [place] [address] [facility] [place] registration. Upon entering the building you will take an immediate left. Please sign in to be registered. Once resgistered they will let me know. Thank you for entrusting us at [facility]) with your care. We look forward to seeing for you.     [facility]) Pulmonary Function Lab    Jocelyne Weston Brumfield, Registered Respiratory Therapist

## 2021-11-30 NOTE — LETTER
Pulmonary Fuction Testing Lab  [address] [facility], [address] [place] [address]  Office [phone]  Fax [phone]   October 29, 2021    Dear Tu Garcia,      This letter is to remind you of your upcoming appointment on 11/30/2021 at 9 AM. Our current protocol states that EVEN if you have had a Covid vaccine you must have your COVID-19 test on 11/29/2021 between 7 AM and 12 PM. You need to quarantine once you have done your Covid test until your appointment. We are required to have results back prior to your appointment. If your test is positive you will be called with those results. If you choose to have your Covid test done at another facility rather than at one of our testing locations, you must have the printed test results in hand with you on the day of your appointment. Please be aware that you will need to get an order for the Covid test from your doctor if you do not go to one of the following [facility]) locations. Below are the addresses for the testing locations:    [address] [place] [address] Ave, [address]  Mondays, Wednesdays, and Fridays 7 AM- 12 PM    [place] Location   [facility]) Urgent Care next to [address] [place] [address] [facility]  Monday thru Friday 8 AM - 5 PM     Please do not use any bronchodilators (rescue inhalers, breathing treatments, ect.) 4 hours prior to test and no long acting respiratory medications 12 hours before test. These medications will cause test results to be inaccurate. On the day of your appointment you will come through the Main Entrance of [facility] in order to register at the [address] [place] [address] registration. Please note that if you are more than 15 minutes late we will need to reschedule. Upon entering the building you will take an immediate left. Please sign in to be registered. Thank you for entrusting us at [facility] with your care. We look forward to seeing for you.     [facility]) Pulmonary Function Lab    [facility]  Registered Respiratory Therapist

## 2021-11-30 NOTE — PROCEDURES
Pulmonary Function Study    Interpretation:    The FVC is Normal. FEV1 is Normal. FEV1/FVC ratio is Normal. After bronchodilator therapy there was no significant improvement in FEV1. Total lung capacity is Normal. Residual volume is Normal.      Diffusing lung capacity when corrected for alveolar volume is Normal.         Impression:    1. Normal pulmonary function.         Collette Wray MD, FCCP, [facility]

## 2021-12-28 ENCOUNTER — OFFICE VISIT (OUTPATIENT)
Dept: PULMONOLOGY | Age: 76
End: 2021-12-28
Payer: MEDICARE

## 2021-12-28 VITALS
WEIGHT: 180.2 LBS | OXYGEN SATURATION: 97 % | HEART RATE: 63 BPM | DIASTOLIC BLOOD PRESSURE: 56 MMHG | HEIGHT: 66 IN | SYSTOLIC BLOOD PRESSURE: 102 MMHG | TEMPERATURE: 97 F | BODY MASS INDEX: 28.96 KG/M2

## 2021-12-28 DIAGNOSIS — I50.22 CHRONIC SYSTOLIC (CONGESTIVE) HEART FAILURE (HCC): ICD-10-CM

## 2021-12-28 DIAGNOSIS — R06.09 DOE (DYSPNEA ON EXERTION): ICD-10-CM

## 2021-12-28 DIAGNOSIS — J96.11 CHRONIC RESPIRATORY FAILURE WITH HYPOXIA (HCC): Primary | ICD-10-CM

## 2021-12-28 PROCEDURE — 99213 OFFICE O/P EST LOW 20 MIN: CPT | Performed by: INTERNAL MEDICINE

## 2021-12-28 PROCEDURE — 1123F ACP DISCUSS/DSCN MKR DOCD: CPT | Performed by: INTERNAL MEDICINE

## 2021-12-28 PROCEDURE — 4040F PNEUMOC VAC/ADMIN/RCVD: CPT | Performed by: INTERNAL MEDICINE

## 2021-12-28 PROCEDURE — G8427 DOCREV CUR MEDS BY ELIG CLIN: HCPCS | Performed by: INTERNAL MEDICINE

## 2021-12-28 PROCEDURE — G8482 FLU IMMUNIZE ORDER/ADMIN: HCPCS | Performed by: INTERNAL MEDICINE

## 2021-12-28 PROCEDURE — 1036F TOBACCO NON-USER: CPT | Performed by: INTERNAL MEDICINE

## 2021-12-28 PROCEDURE — G8417 CALC BMI ABV UP PARAM F/U: HCPCS | Performed by: INTERNAL MEDICINE

## 2021-12-28 ASSESSMENT — ENCOUNTER SYMPTOMS
APNEA: 0
SHORTNESS OF BREATH: 0
WHEEZING: 0
ABDOMINAL PAIN: 0
CHEST TIGHTNESS: 0
ANAL BLEEDING: 0
RHINORRHEA: 0
ABDOMINAL DISTENTION: 0
COUGH: 0
BACK PAIN: 0

## 2021-12-28 NOTE — PROGRESS NOTES
Pulmonary and Sleep Medicine    [address] (:  1945) is a 68 y.o. male,Established patient, here for evaluation of the following chief complaint(s):  Follow-up (Pt came in for PFT results; Pt states that he is doing really good and has no complaints.)      Referring physician:  No referring provider defined for this encounter. ASSESSMENT/PLAN:  1. Chronic respiratory failure with hypoxia (HCC)  -     Pulse oximetry, overnight; Future  2. Chronic systolic (congestive) heart failure ([address] EF 35% on echo done in 2021  3. PARKS (dyspnea on exertion)        We will do an overnight oximetry on room air to assess ongoing need for oxygen. He could need oxygen during sleep due to his cardiac failure. Beola Bernheim, MD, [facility], [facility]    Return in about 4 weeks (around 2022). SUBJECTIVE/OBJECTIVE:  He is here for follow up after his pulmonary function test which was normal.  He continues to be on oxygen during sleep. He does not use any inhalers. He denies any wheezing at this time. Prior to Visit Medications    Medication Sig Taking? Authorizing Provider   furosemide (LASIX) 40 MG tablet Take 1 tablet by mouth daily Yes AMPARO Horn   cetirizine (ZYRTEC ALLERGY) 10 MG tablet Zyrtec 10 mg tablet   Take 1 tablet every day by oral route.  Yes Historical Provider, MD   OXYGEN Inhale 4 L into the lungs nightly  Yes Historical Provider, MD   metOLazone (ZAROXOLYN) 2.5 MG tablet Take 1 tablet by mouth Twice a Week Take 1 tablet by mouth on  and  ONLY Yes Kay Mancia MD   lisinopril (PRINIVIL;ZESTRIL) 5 MG tablet Take 1 tablet by mouth 2 times daily Yes Kay Mancia MD   Multiple Vitamins-Minerals (THERAPEUTIC MULTIVITAMIN-MINERALS) tablet Take 1 tablet by mouth daily Yes Historical Provider, MD   acetaminophen (TYLENOL) 325 MG tablet Take 650 mg by mouth every 6 hours as needed for Pain Yes Historical Provider, MD   aspirin EC 81 MG EC Negative for abdominal distention, abdominal pain and anal bleeding. Endocrine: Negative for cold intolerance, heat intolerance and polydipsia. Genitourinary: Negative for difficulty urinating, dysuria, enuresis and flank pain. Musculoskeletal: Negative for arthralgias, back pain and gait problem. Allergic/Immunologic: Negative for environmental allergies. Neurological: Negative for dizziness, facial asymmetry, light-headedness and headaches. Vitals:    12/28/21 1059   BP: (!) 102/56   Pulse: 63   Temp: 97 °F (36.1 °C)   SpO2: 97%     Physical Exam  Vitals reviewed. Constitutional:       Appearance: Normal appearance. HENT:      Head: Normocephalic and atraumatic. Nose: Nose normal.   Eyes:      Extraocular Movements: Extraocular movements intact. Conjunctiva/sclera: Conjunctivae normal.   Cardiovascular:      Rate and Rhythm: Normal rate and regular rhythm. Heart sounds: No murmur heard. No friction rub. Pulmonary:      Effort: Pulmonary effort is normal. No respiratory distress. Breath sounds: Normal breath sounds. No stridor. No wheezing, rhonchi or rales. Abdominal:      General: There is no distension. Palpations: There is no mass. Tenderness: There is no abdominal tenderness. There is no guarding or rebound. Musculoskeletal:      Cervical back: Normal range of motion and neck supple. Neurological:      Mental Status: He is alert and oriented to person, place, and time. This note was generated used a voice recognition software. Errors in voice recognition may have occurred. An electronic signature was used to authenticate this note.     --Stella Monsalve MD

## 2022-01-14 ENCOUNTER — OFFICE VISIT (OUTPATIENT)
Dept: CARDIOLOGY CLINIC | Age: 77
End: 2022-01-14
Payer: MEDICARE

## 2022-01-14 VITALS
OXYGEN SATURATION: 93 % | HEART RATE: 75 BPM | WEIGHT: 176 LBS | HEIGHT: 66 IN | SYSTOLIC BLOOD PRESSURE: 102 MMHG | BODY MASS INDEX: 28.28 KG/M2 | DIASTOLIC BLOOD PRESSURE: 58 MMHG

## 2022-01-14 DIAGNOSIS — E78.5 HYPERLIPIDEMIA, UNSPECIFIED HYPERLIPIDEMIA TYPE: ICD-10-CM

## 2022-01-14 DIAGNOSIS — I48.0 PAF (PAROXYSMAL ATRIAL FIBRILLATION) (HCC): ICD-10-CM

## 2022-01-14 DIAGNOSIS — I25.5 ISCHEMIC CARDIOMYOPATHY: Primary | ICD-10-CM

## 2022-01-14 DIAGNOSIS — I25.10 CORONARY ARTERY DISEASE INVOLVING NATIVE CORONARY ARTERY OF NATIVE HEART WITHOUT ANGINA PECTORIS: ICD-10-CM

## 2022-01-14 DIAGNOSIS — I50.22 CHRONIC SYSTOLIC (CONGESTIVE) HEART FAILURE (HCC): ICD-10-CM

## 2022-01-14 DIAGNOSIS — I10 ESSENTIAL HYPERTENSION: ICD-10-CM

## 2022-01-14 PROCEDURE — G8482 FLU IMMUNIZE ORDER/ADMIN: HCPCS | Performed by: NURSE PRACTITIONER

## 2022-01-14 PROCEDURE — 1123F ACP DISCUSS/DSCN MKR DOCD: CPT | Performed by: NURSE PRACTITIONER

## 2022-01-14 PROCEDURE — 1036F TOBACCO NON-USER: CPT | Performed by: NURSE PRACTITIONER

## 2022-01-14 PROCEDURE — 99214 OFFICE O/P EST MOD 30 MIN: CPT | Performed by: NURSE PRACTITIONER

## 2022-01-14 PROCEDURE — 4040F PNEUMOC VAC/ADMIN/RCVD: CPT | Performed by: NURSE PRACTITIONER

## 2022-01-14 PROCEDURE — G8417 CALC BMI ABV UP PARAM F/U: HCPCS | Performed by: NURSE PRACTITIONER

## 2022-01-14 PROCEDURE — G8427 DOCREV CUR MEDS BY ELIG CLIN: HCPCS | Performed by: NURSE PRACTITIONER

## 2022-01-14 PROCEDURE — 93289 INTERROG DEVICE EVAL HEART: CPT | Performed by: NURSE PRACTITIONER

## 2022-01-14 RX ORDER — MONTELUKAST SODIUM 10 MG/1
10 TABLET ORAL NIGHTLY
COMMUNITY

## 2022-01-14 RX ORDER — POLYETHYLENE GLYCOL 3350 17 G/17G
17 POWDER, FOR SOLUTION ORAL DAILY
COMMUNITY

## 2022-01-14 ASSESSMENT — ENCOUNTER SYMPTOMS
COUGH: 0
CHEST TIGHTNESS: 0
SORE THROAT: 0
SHORTNESS OF BREATH: 0
WHEEZING: 0

## 2022-01-14 NOTE — PROGRESS NOTES
[facility] Cardiology   Established Patient Office Visit   [facility]. [address] [facility]  [phone]        OFFICE VISIT:  2022    Yanet Caruso - : 1945    Reason For Visit:  Lamin Gill is a 68 y.o. male who is here for 6 Month Follow-Up    1. Ischemic cardiomyopathy    2. PAF (paroxysmal atrial fibrillation) ([address]    3. Chronic systolic (congestive) heart failure ([address]    4. Coronary artery disease involving native coronary artery of native heart without angina pectoris    5. Essential hypertension    6. Hyperlipidemia, unspecified hyperlipidemia type      Patient with a history of ischemic cardiomyopathy/ICD, coronary artery disease, paroxysmal atrial fib, chronic systolic congestive heart failure, hypertension, hyperlipidemia, and severe pulmonary hypertension. Being followed by pulmonology. He is a patient of Dr. Robinson Moyer. Patient presents to clinic today for 6-month follow-up    Denies any chest pain, pressure or tightness. He does have baseline shortness of breath this is no worsening. There is no orthopnea or PND. Patient denies any lightheadedness, dizziness or syncope.         Subjective    Yanet Caruso is a 68 y.o. male with the following history as recorded in [employer]:    Patient Active Problem List    Diagnosis Date Noted    Thrombocytopenia, unspecified 2021    ICD (implantable cardioverter-defibrillator) pocket hematoma, subsequent encounter 2020    Implantable cardioverter-defibrillator (ICD) generator end of life     PAF (paroxysmal atrial fibrillation) ([address] 2022    Chronic respiratory failure with hypoxia ([address] 2021    PARKS (dyspnea on exertion) 2021    Restrictive lung disease 2021    Acute on chronic combined systolic and diastolic congestive heart failure ([address] 2021    Severe pulmonary hypertension ([address] 2021    Elevated prostate specific antigen (PSA) 2021    Lipoma of left upper extremity     Chronic systolic (congestive) heart failure (HCC) 06/03/2019    S/P CABG (coronary artery bypass graft) 05/10/2017    Abnormal nuclear stress test     Abdominal pain 02/10/2017    Chronic constipation 02/10/2017    Liver mass 02/10/2017    Acalculous cholecystitis 09/26/2016    Hyperlipidemia     Acute myocardial infarction of anterolateral wall, subsequent episode of care [facility]) 12/25/2011    Single implantable cardioverter-defibrillator (ICD) in situ 05/25/2011    Ischemic cardiomyopathy 05/25/2011    Coronary atherosclerosis of native coronary artery 02/01/2011    Essential hypertension 02/01/2011    Type 2 diabetes mellitus with complication, without long-term current use of insulin ([facility]) 02/01/2011     Current Outpatient Medications   Medication Sig Dispense Refill    polyethylene glycol (GLYCOLAX) 17 GM/SCOOP powder Take 17 g by mouth daily      montelukast (SINGULAIR) 10 MG tablet Take 10 mg by mouth nightly      furosemide (LASIX) 40 MG tablet Take 1 tablet by mouth daily 60 tablet 2    cetirizine (ZYRTEC ALLERGY) 10 MG tablet Zyrtec 10 mg tablet   Take 1 tablet every day by oral route.       lisinopril (PRINIVIL;ZESTRIL) 5 MG tablet Take 1 tablet by mouth 2 times daily 60 tablet 3    Multiple Vitamins-Minerals (THERAPEUTIC MULTIVITAMIN-MINERALS) tablet Take 1 tablet by mouth daily      acetaminophen (TYLENOL) 325 MG tablet Take 650 mg by mouth every 6 hours as needed for Pain      aspirin EC 81 MG EC tablet Take 81 mg by mouth daily      nitroGLYCERIN (NITROSTAT) 0.4 MG SL tablet Place 1 tablet under the tongue every 5 minutes as needed for Chest pain 25 tablet 3    blood glucose test strips (EXACTECH TEST) strip USE 1 STRIP FOR BLOOD TEST TWICE A DAY ALTERNATING TIMES EACH DAY *STABLE INSULIN THERAPY*     DEC 30,2019      sotalol (BETAPACE) 80 MG tablet Take 80 mg by mouth 2 times daily       budesonide-formoterol (SYMBICORT) 160-4.5 MCG/ACT AERO Inhale 2 puffs into the lungs 2 times daily as needed       bumetanide (BUMEX) 2 MG tablet Take 2 mg by mouth three times a week Indications: Saturday and Tuesday Monday, Wednesday and Friday.  escitalopram (LEXAPRO) 10 MG tablet Take 10 mg by mouth daily      metFORMIN (GLUCOPHAGE) 500 MG tablet Take 500 mg by mouth 2 times daily (with meals)       PROAIR  (90 Base) MCG/ACT inhaler Inhale 2 puffs into the lungs as needed for Shortness of Breath   1    potassium chloride (KLOR-CON M) 20 MEQ extended release tablet Take 10 mEq by mouth daily   2    simvastatin (ZOCOR) 80 MG tablet Take 40 mg by mouth nightly       pantoprazole (PROTONIX) 40 MG tablet Take 40 mg by mouth daily.  mirtazapine (REMERON SOL-TAB) 30 MG disintegrating tablet Take 30 mg by mouth nightly       OXYGEN Inhale 4 L into the lungs nightly  (Patient not taking: Reported on 1/14/2022)      metOLazone (ZAROXOLYN) 2.5 MG tablet Take 1 tablet by mouth Twice a Week Take 1 tablet by mouth on Mondays and Thursdays ONLY (Patient not taking: Reported on 1/14/2022) 30 tablet 3     No current facility-administered medications for this visit.      Allergies: Ambien [zolpidem], Lortab [hydrocodone-acetaminophen], and Phenergan  [promethazine hcl]  Past Medical History:   Diagnosis Date    Acute myocardial infarction of anterolateral wall, subsequent episode of care [facility] 12/25/2011    AICD discharge     Allergies     Arthritis     CAD (coronary artery disease) of artery bypass graft     Cardiomyopathy, ischemic 5/25/2011    Coronary atherosclerosis of native coronary artery     Elevated PSA     Hyperlipidemia     Hyperlipidemia     Cholesterol management per Alok Freeman M.D.    Hypertension     ICD (implantable cardiac defibrillator) 5/25/2011    Rapid atrial fibrillation (HCC)     Shortness of breath     Type II or unspecified type diabetes mellitus without mention of complication, not stated as uncontrolled      Past Surgical History: Procedure Laterality Date    APPENDECTOMY      ARM SURGERY Left 10/14/2019    EXCISION LIPOMA LEFT ARM performed by Sharyn Brower MD at [address] [facility]  4/29/14  [facility]    2 vessel CAD. EF 45%    CARDIAC PACEMAKER PLACEMENT      medtronic     CHOLECYSTECTOMY, LAPAROSCOPIC N/A 9/26/2016    CHOLECYSTECTOMY LAPAROSCOPIC performed by Nica Fernandez MD at [address] [facility] [address] GRAFT  01/03/2011    2V CABG (LIMA - LAD, SVG - PLM)    DIAGNOSTIC CARDIAC CATH LAB PROCEDURE  12/25/10    left heart cath, selective coronary arteriography, left ventriculography, direct infarct stenting to the left anterior descending coronary artery, left iliac arteriography, right iliac arteriography via left common femoral artery     FINGER AMPUTATION      traumatic left index finger    HERNIA REPAIR      ventral    MIDDLE EAR SURGERY Right 1966    tumor removed, some hearing loss     PACEMAKER PLACEMENT      [employer] PACEMAKER/DEFIB    [place] COLONOSCOPY W/BIOPSY SINGLE/MULTIPLE N/A 4/3/2017    Dr JOSE Herrera-diverticular disease, benign ulcerated inflammatory polyp demonstrating a granulation tissue response--1 yr recall    [place] EGD TRANSORAL BIOPSY SINGLE/MULTIPLE N/A 4/3/2017    Dr Yobani Hardy hiatal hernia, gastritis, chronic inflammation     Family History   Adopted: Yes   Problem Relation Age of Onset    Diabetes Other     Heart Disease Other      Social History     Tobacco Use    Smoking status: Never Smoker    Smokeless tobacco: Former User     Types: Chew   Substance Use Topics    Alcohol use: No     Alcohol/week: 0.0 standard drinks          Review of Systems:    Review of Systems   Constitutional: Negative for activity change, chills, diaphoresis, fatigue and fever. HENT: Negative for congestion and sore throat. Respiratory: Negative for cough, chest tightness, shortness of breath and wheezing. Cardiovascular: Negative for chest pain, palpitations and leg swelling.    Neurological: Negative for dizziness, syncope, light-headedness and headaches. Psychiatric/Behavioral: Negative for confusion. The patient is not nervous/anxious. Objective:    BP (!) 102/58   Pulse 75   Ht 5' 6\" (1.676 m)   Wt 176 lb (79.8 kg)   SpO2 93%   BMI 28.41 kg/m²     GENERAL - well developed and well nourished, conversant  HEENT -  PERRLA, Hearing appears normal, gentleman lids are normal.  External inspection of ears and nose appear normal.  NECK - no thyromegaly, no JVD, trachea is in the midline  CARDIOVASCULAR - PMI is in the mid line clavicular position, Normal S1 and S2 with o systolic murmur. No S3 or S4    PULMONARY - no respiratory distress. No wheezes or rales. Lungs are clear to ausculation, normal respiratory effort. ABDOMEN  - soft, non tender, no rebound  MUSCULOSKELETAL  - range of motion of the upper and lower extermites appears normal and equal and is without pain   EXTREMITIES - no significant edema   NEUROLOGIC - gait and station are normal  SKIN - turgor is normal, can warm and dry. PSYCHIATRIC - normal mood and affect, alert and orientated x 3,      ASSESSMENT:    ALL THE CARDIOLOGY PROBLEMS ARE LISTED ABOVE; HOWEVER, THE FOLLOWING SPECIFIC CARDIAC PROBLEMS / CONDITIONS WERE ADDRESSED AND TREATED DURING THE OFFICE VISIT TODAY:                                                                                            MEDICAL DECISION MAKING             Cardiac Specific Problem / Diagnosis  Discussion and Data Reviewed Diagnostic Procedures Ordered Management Options Selected           1. Ischemic cardiomyopathy/ICD  Stable   Review and summation of old records:    Interrogation in the office today battery status 10.2 years. Lead impedance stable. Thresholds stable. Mode is VVI lower rate 40 bpm no shocks. [facility] 3 months. Yes Continue current medications:     Yes           2. CAD  Stable   No chest pain. Patient is on aspirin and Zocor.  No Continue current medications:    Yes           3. PAF Stable  patient is on Betapace 80 mg twice daily. No Continue current medications:       Yes           4. Chronic systolic congestive heart failure Stable  no overt signs of failure. Patient is on Bumex 2 mg 3 times a day. Lisinopril 5 mg twice daily. Lasix 40 mg daily. No Continue current medications:       Yes     No orders of the defined types were placed in this encounter. No orders of the defined types were placed in this encounter. Discussed with patient. Return in about 6 months (around 7/14/2022) for Dr Tejinder Jones . I greatly appreciate the opportunity to meet Henri Hodge and your confidence in allowing me to participate in his cardiovascular care. AMPARO Ireland NP  1/14/2022 10:11 AM CST                    This dictation was generated by voice recognition computer software. Although all attempts are made to edit dictation for accuracy, there may be errors in the transcription that are not intended.

## 2022-01-25 ENCOUNTER — OFFICE VISIT (OUTPATIENT)
Dept: PULMONOLOGY | Age: 77
End: 2022-01-25
Payer: MEDICARE

## 2022-01-25 VITALS
HEIGHT: 66 IN | TEMPERATURE: 97.2 F | OXYGEN SATURATION: 96 % | SYSTOLIC BLOOD PRESSURE: 122 MMHG | BODY MASS INDEX: 28.51 KG/M2 | HEART RATE: 61 BPM | DIASTOLIC BLOOD PRESSURE: 62 MMHG | WEIGHT: 177.4 LBS

## 2022-01-25 DIAGNOSIS — G47.33 OSA (OBSTRUCTIVE SLEEP APNEA): ICD-10-CM

## 2022-01-25 DIAGNOSIS — G47.34 SLEEP RELATED HYPOXIA: ICD-10-CM

## 2022-01-25 DIAGNOSIS — J98.4 RESTRICTIVE LUNG DISEASE: ICD-10-CM

## 2022-01-25 DIAGNOSIS — R06.09 DOE (DYSPNEA ON EXERTION): ICD-10-CM

## 2022-01-25 DIAGNOSIS — I25.5 ISCHEMIC CARDIOMYOPATHY: ICD-10-CM

## 2022-01-25 DIAGNOSIS — J96.11 CHRONIC RESPIRATORY FAILURE WITH HYPOXIA (HCC): Primary | ICD-10-CM

## 2022-01-25 PROCEDURE — G8427 DOCREV CUR MEDS BY ELIG CLIN: HCPCS | Performed by: INTERNAL MEDICINE

## 2022-01-25 PROCEDURE — 99214 OFFICE O/P EST MOD 30 MIN: CPT | Performed by: INTERNAL MEDICINE

## 2022-01-25 PROCEDURE — G8417 CALC BMI ABV UP PARAM F/U: HCPCS | Performed by: INTERNAL MEDICINE

## 2022-01-25 PROCEDURE — 4040F PNEUMOC VAC/ADMIN/RCVD: CPT | Performed by: INTERNAL MEDICINE

## 2022-01-25 PROCEDURE — 1123F ACP DISCUSS/DSCN MKR DOCD: CPT | Performed by: INTERNAL MEDICINE

## 2022-01-25 PROCEDURE — 1036F TOBACCO NON-USER: CPT | Performed by: INTERNAL MEDICINE

## 2022-01-25 PROCEDURE — G8482 FLU IMMUNIZE ORDER/ADMIN: HCPCS | Performed by: INTERNAL MEDICINE

## 2022-01-25 ASSESSMENT — ENCOUNTER SYMPTOMS
CHEST TIGHTNESS: 0
ANAL BLEEDING: 0
ABDOMINAL PAIN: 0
COUGH: 0
WHEEZING: 0
ABDOMINAL DISTENTION: 0
APNEA: 0
BACK PAIN: 0
SHORTNESS OF BREATH: 1
RHINORRHEA: 0

## 2022-01-25 NOTE — PROGRESS NOTES
Pulmonary and Sleep Medicine    Frances Smith (:  1945) is a 68 y.o. male,Established patient, here for evaluation of the following chief complaint(s):  Follow-up (Pt came in for the results of his overnight oximetry test .)      Referring physician:  No referring provider defined for this encounter. ASSESSMENT/PLAN:  1. Chronic respiratory failure with hypoxia (HCC)  2. PARKS (dyspnea on exertion)  3. Restrictive lung disease  4. Sleep related hypoxia  -     Ambulatory referral to Sleep Medicine  -     Baseline Diagnostic Sleep Study; Future  5. GERMAIN (obstructive sleep apnea)  -     Ambulatory referral to Sleep Medicine  -     Baseline Diagnostic Sleep Study; Future  6. Ischemic cardiomyopathy  -     Ambulatory referral to Sleep Medicine  -     Baseline Diagnostic Sleep Study; Future        He should be on oxygen during sleep at least 3 L/min. He does have significant risk factors for obstructive sleep apnea. We will check a sleep study. He probably would benefit from CPAP in the event of obstructive sleep apnea due to his severe ischemic cardiomyopathy       Collette Wray MD, [facility], DABSM    Return in about 3 months (around 2022). SUBJECTIVE/OBJECTIVE:  He is here for follow-up on sleep hypoxemia. He had an overnight oximetry that showed significant oxygen desaturation most of the night. He does have oxygen at home but he has not been using it. Prior to Visit Medications    Medication Sig Taking? Authorizing Provider   polyethylene glycol (GLYCOLAX) 17 GM/SCOOP powder Take 17 g by mouth daily Yes Historical Provider, MD   montelukast (SINGULAIR) 10 MG tablet Take 10 mg by mouth nightly Yes Historical Provider, MD   furosemide (LASIX) 40 MG tablet Take 1 tablet by mouth daily Yes AMPARO Franklin   cetirizine (ZYRTEC ALLERGY) 10 MG tablet Zyrtec 10 mg tablet   Take 1 tablet every day by oral route.  Yes Historical Provider, MD   OXYGEN Inhale 4 L into the lungs nightly tablet Take 40 mg by mouth daily. Yes Historical Provider, MD   mirtazapine (REMERON SOL-TAB) 30 MG disintegrating tablet Take 30 mg by mouth nightly  Yes Historical Provider, MD        Review of Systems   Constitutional: Negative for activity change, appetite change, chills, diaphoresis and fatigue. HENT: Negative for congestion, dental problem, drooling, ear discharge, postnasal drip and rhinorrhea. Eyes: Negative for visual disturbance. Respiratory: Positive for shortness of breath. Negative for apnea, cough, chest tightness and wheezing. Gastrointestinal: Negative for abdominal distention, abdominal pain and anal bleeding. Endocrine: Negative for cold intolerance, heat intolerance and polydipsia. Genitourinary: Negative for difficulty urinating, dysuria, enuresis and flank pain. Musculoskeletal: Negative for arthralgias, back pain and gait problem. Allergic/Immunologic: Negative for environmental allergies. Neurological: Negative for dizziness, facial asymmetry, light-headedness and headaches. Vitals:    01/25/22 1103   BP: 122/62   Pulse: 61   Temp: 97.2 °F (36.2 °C)   SpO2: 96%     Physical Exam  Vitals reviewed. Constitutional:       Appearance: Normal appearance. HENT:      Head: Normocephalic and atraumatic. Nose: Nose normal.   Eyes:      Extraocular Movements: Extraocular movements intact. Conjunctiva/sclera: Conjunctivae normal.   Cardiovascular:      Rate and Rhythm: Normal rate and regular rhythm. Heart sounds: No murmur heard. No friction rub. Pulmonary:      Effort: Pulmonary effort is normal. No respiratory distress. Breath sounds: Normal breath sounds. No stridor. No wheezing, rhonchi or rales. Abdominal:      General: There is no distension. Palpations: There is no mass. Tenderness: There is no abdominal tenderness. There is no guarding or rebound. Musculoskeletal:      Cervical back: Normal range of motion and neck supple. Neurological:      Mental Status: He is alert and oriented to person, place, and time. This note was generated used a voice recognition software. Errors in voice recognition may have occurred. An electronic signature was used to authenticate this note.     --Junior Santoro MD

## 2022-02-15 RX ORDER — METOLAZONE 2.5 MG/1
TABLET ORAL
Qty: 30 TABLET | Refills: 3 | Status: SHIPPED | OUTPATIENT
Start: 2022-02-15

## 2022-02-18 RX ORDER — FUROSEMIDE 40 MG/1
TABLET ORAL
Qty: 60 TABLET | Refills: 5 | Status: SHIPPED | OUTPATIENT
Start: 2022-02-18

## 2022-04-15 DIAGNOSIS — I25.5 ISCHEMIC CARDIOMYOPATHY: ICD-10-CM

## 2022-04-15 DIAGNOSIS — I50.22 CHRONIC SYSTOLIC (CONGESTIVE) HEART FAILURE (HCC): ICD-10-CM

## 2022-04-15 DIAGNOSIS — T82.837D: Primary | ICD-10-CM

## 2022-04-15 PROCEDURE — 93296 REM INTERROG EVL PM/IDS: CPT | Performed by: NURSE PRACTITIONER

## 2022-04-15 PROCEDURE — 93295 DEV INTERROG REMOTE 1/2/MLT: CPT | Performed by: NURSE PRACTITIONER

## 2022-04-22 ENCOUNTER — OFFICE VISIT (OUTPATIENT)
Dept: CARDIOLOGY CLINIC | Age: 77
End: 2022-04-22
Payer: MEDICARE

## 2022-04-22 VITALS
DIASTOLIC BLOOD PRESSURE: 72 MMHG | BODY MASS INDEX: 28.08 KG/M2 | OXYGEN SATURATION: 92 % | HEART RATE: 56 BPM | WEIGHT: 174 LBS | SYSTOLIC BLOOD PRESSURE: 112 MMHG

## 2022-04-22 DIAGNOSIS — R07.9 CHEST PAIN, UNSPECIFIED TYPE: ICD-10-CM

## 2022-04-22 DIAGNOSIS — I10 ESSENTIAL HYPERTENSION: ICD-10-CM

## 2022-04-22 DIAGNOSIS — I25.10 CORONARY ARTERY DISEASE INVOLVING NATIVE CORONARY ARTERY OF NATIVE HEART WITHOUT ANGINA PECTORIS: ICD-10-CM

## 2022-04-22 DIAGNOSIS — I25.10 CORONARY ARTERY DISEASE INVOLVING NATIVE CORONARY ARTERY OF NATIVE HEART WITHOUT ANGINA PECTORIS: Primary | ICD-10-CM

## 2022-04-22 DIAGNOSIS — I50.40 COMBINED SYSTOLIC AND DIASTOLIC ACC/AHA STAGE C CONGESTIVE HEART FAILURE (HCC): ICD-10-CM

## 2022-04-22 DIAGNOSIS — I25.5 ISCHEMIC CARDIOMYOPATHY: ICD-10-CM

## 2022-04-22 DIAGNOSIS — R07.1 CHEST PAIN ON BREATHING: ICD-10-CM

## 2022-04-22 DIAGNOSIS — I25.5 ISCHEMIC CARDIOMYOPATHY: Primary | ICD-10-CM

## 2022-04-22 DIAGNOSIS — E78.5 DYSLIPIDEMIA: ICD-10-CM

## 2022-04-22 LAB
ANION GAP SERPL CALCULATED.3IONS-SCNC: 13 MMOL/L (ref 7–19)
BUN BLDV-MCNC: 74 MG/DL (ref 8–23)
CALCIUM SERPL-MCNC: 10.4 MG/DL (ref 8.8–10.2)
CHLORIDE BLD-SCNC: 99 MMOL/L (ref 98–111)
CO2: 29 MMOL/L (ref 22–29)
CREAT SERPL-MCNC: 2.6 MG/DL (ref 0.5–1.2)
GFR AFRICAN AMERICAN: 29
GFR NON-AFRICAN AMERICAN: 24
GLUCOSE BLD-MCNC: 129 MG/DL (ref 74–109)
POTASSIUM SERPL-SCNC: 4.8 MMOL/L (ref 3.5–5)
SODIUM BLD-SCNC: 141 MMOL/L (ref 136–145)

## 2022-04-22 PROCEDURE — G8427 DOCREV CUR MEDS BY ELIG CLIN: HCPCS | Performed by: NURSE PRACTITIONER

## 2022-04-22 PROCEDURE — 1123F ACP DISCUSS/DSCN MKR DOCD: CPT | Performed by: NURSE PRACTITIONER

## 2022-04-22 PROCEDURE — 4040F PNEUMOC VAC/ADMIN/RCVD: CPT | Performed by: NURSE PRACTITIONER

## 2022-04-22 PROCEDURE — 93289 INTERROG DEVICE EVAL HEART: CPT | Performed by: NURSE PRACTITIONER

## 2022-04-22 PROCEDURE — 1036F TOBACCO NON-USER: CPT | Performed by: NURSE PRACTITIONER

## 2022-04-22 PROCEDURE — 99214 OFFICE O/P EST MOD 30 MIN: CPT | Performed by: NURSE PRACTITIONER

## 2022-04-22 PROCEDURE — G8417 CALC BMI ABV UP PARAM F/U: HCPCS | Performed by: NURSE PRACTITIONER

## 2022-04-22 ASSESSMENT — ENCOUNTER SYMPTOMS
SHORTNESS OF BREATH: 1
WHEEZING: 0
COUGH: 0
CHEST TIGHTNESS: 0
SORE THROAT: 0

## 2022-04-22 NOTE — PROGRESS NOTES
[facility] Cardiology   Established Patient Office Visit   [facility]. [address] [facility]  [phone]        OFFICE VISIT:  2022    Gauri Santacruz - : 1945    Reason For Visit:  Hema Lao is a 68 y.o. male who is here for Follow-up    1. Coronary artery disease involving native coronary artery of native heart without angina pectoris    2. Ischemic cardiomyopathy    3. Essential hypertension    4. Dyslipidemia    5. 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 Chest pain on breathing    7. Chest pain, unspecified type       Patient with a history of ischemic cardiomyopathy/ICD, coronary artery disease, paroxysmal atrial fib, chronic systolic congestive heart failure, hypertension, hyperlipidemia, and severe pulmonary hypertension. Being followed by pulmonology.     He is a patient of Dr. Solomon Guan. Was seen at his primary care provider's office this week. He was complaining of decreased stamina fatigue, some shortness of breath with exertion and chest discomfort. They ordered a proBNP which was 2233. This is elevated but however this is significantly lower than what he has been in the past which has been about 6000 range. His Medtronic interrogation today shows OptiVol is low no fluid. He has no lower extremity edema. And lungs are clear. Primary care provider's office ordered a 2D echo and Daryle Block. Patient states he has been having some fatigue, shortness of breath with activity and chest discomfort at times. This is worsened over the last few months.     Subjective    Gauri Santacruz is a 68 y.o. male with the following history as recorded in [email or web address]:    Patient Active Problem List    Diagnosis Date Noted    Thrombocytopenia, unspecified 2021    ICD (implantable cardioverter-defibrillator) pocket hematoma, subsequent encounter 2020    Implantable cardioverter-defibrillator (ICD) generator end of life     GERMAIN (obstructive sleep apnea) 01/25/2022    Sleep related hypoxia 01/25/2022    PA[facility] 01/14/2022    Chronic respiratory failure with hypoxia (HCC) 09/08/2021    PARKS (dyspnea on exertion) 09/08/2021    Restrictive lung disease 09/08/2021    Acute on chronic combined systolic and diastolic congestive heart failure ([facility] 08/04/2021    Severe pulmonary hypertension ([facility] 08/04/2021    Elevated prostate specific antigen (PSA) 07/29/2021    Lipoma of left upper extremity     Chronic systolic (congestive) heart failure ([facility] 06/03/2019    S/P CABG (coronary artery bypass graft) 05/10/2017    Abnormal nuclear stress test     Abdominal pain 02/10/2017    Chronic constipation 02/10/2017    Liver mass 02/10/2017    Acalculous cholecystitis 09/26/2016    Hyperlipidemia     Acute myocardial infarction of anterolateral wall, subsequent episode of care ([facility] 12/25/2011    Single implantable cardioverter-defibrillator (ICD) in situ 05/25/2011    Ischemic cardiomyopathy 05/25/2011    Coronary atherosclerosis of native coronary artery 02/01/2011    Essential hypertension 02/01/2011    Type 2 diabetes mellitus with complication, without long-term current use of insulin ([facility]) 02/01/2011     Current Outpatient Medications   Medication Sig Dispense Refill    furosemide (LASIX) 40 MG tablet Take ONE (1) tablet by mouth daily 60 tablet 5    metOLazone (ZAROXOLYN) 2.5 MG tablet Take ONE (1) tablet by mouth Twice A Week ON MONDAYS AND THURSDAYS ONLY 30 tablet 3    polyethylene glycol (GLYCOLAX) 17 GM/SCOOP powder Take 17 g by mouth daily      montelukast (SINGULAIR) 10 MG tablet Take 10 mg by mouth nightly      cetirizine (ZYRTEC ALLERGY) 10 MG tablet Zyrtec 10 mg tablet   Take 1 tablet every day by oral route.       OXYGEN Inhale 4 L into the lungs nightly       lisinopril (PRINIVIL;ZESTRIL) 5 MG tablet Take 1 tablet by mouth 2 times daily 60 tablet 3    Multiple Vitamins-Minerals (THERAPEUTIC MULTIVITAMIN-MINERALS) tablet Take 1 tablet by mouth daily      acetaminophen (TYLENOL) 325 MG tablet Take 650 mg by mouth every 6 hours as needed for Pain      aspirin EC 81 MG EC tablet Take 81 mg by mouth daily      nitroGLYCERIN (NITROSTAT) 0.4 MG SL tablet Place 1 tablet under the tongue every 5 minutes as needed for Chest pain 25 tablet 3    blood glucose test strips (EXACTECH TEST) strip USE 1 STRIP FOR BLOOD TEST TWICE A DAY ALTERNATING TIMES EACH DAY *STABLE INSULIN THERAPY*     DEC 30,2019      sotalol (BETAPACE) 80 MG tablet Take 80 mg by mouth 2 times daily       budesonide-formoterol (SYMBICORT) 160-4.5 MCG/ACT AERO Inhale 2 puffs into the lungs 2 times daily as needed       bumetanide (BUMEX) 2 MG tablet Take 2 mg by mouth three times a week Indications: Saturday and Tuesday Monday, Wednesday and Friday.  escitalopram (LEXAPRO) 10 MG tablet Take 10 mg by mouth daily      metFORMIN (GLUCOPHAGE) 500 MG tablet Take 500 mg by mouth 2 times daily (with meals)       PROAIR  (90 Base) MCG/ACT inhaler Inhale 2 puffs into the lungs as needed for Shortness of Breath   1    potassium chloride (KLOR-CON M) 20 MEQ extended release tablet Take 10 mEq by mouth daily   2    simvastatin (ZOCOR) 80 MG tablet Take 40 mg by mouth nightly       pantoprazole (PROTONIX) 40 MG tablet Take 40 mg by mouth daily.  mirtazapine (REMERON SOL-TAB) 30 MG disintegrating tablet Take 30 mg by mouth nightly        No current facility-administered medications for this visit.      Allergies: Ambien [zolpidem], Lortab [hydrocodone-acetaminophen], and Phenergan  [promethazine hcl]  Past Medical History:   Diagnosis Date    Acute myocardial infarction of anterolateral wall, subsequent episode of care [facility]) 12/25/2011    AICD discharge     Allergies     Arthritis     CAD (coronary artery disease) of artery bypass graft     Cardiomyopathy, ischemic 5/25/2011    Coronary Topics    Alcohol use: No     Alcohol/week: 0.0 standard drinks          Review of Systems:    Review of Systems   Constitutional: Positive for activity change and fatigue. Negative for chills, diaphoresis and fever. HENT: Negative for congestion and sore throat. Respiratory: Positive for shortness of breath. Negative for cough, chest tightness and wheezing. Cardiovascular: Positive for chest pain. Negative for palpitations and leg swelling. Neurological: Negative for dizziness, syncope, light-headedness and headaches. Psychiatric/Behavioral: Negative for confusion. The patient is not nervous/anxious. Objective:    /72   Pulse 56   Wt 174 lb (78.9 kg)   SpO2 92%   BMI 28.08 kg/m²     GENERAL - well developed and well nourished, conversant  HEENT -  PERRLA, Hearing appears normal, gentleman lids are normal.  External inspection of ears and nose appear normal.  NECK - no thyromegaly, no JVD, trachea is in the midline  CARDIOVASCULAR - PMI is in the mid line clavicular position, Normal S1 and S2 with nosystolic murmur. No S3 or S4    PULMONARY - no respiratory distress. No wheezes or rales. Lungs are clear to ausculation, normal respiratory effort. ABDOMEN  - soft, non tender, no rebound  MUSCULOSKELETAL  - range of motion of the upper and lower extermites appears normal and equal and is without pain   EXTREMITIES - no significant edema   NEUROLOGIC - gait and station are normal  SKIN - turgor is normal, can warm and dry.   PSYCHIATRIC - normal mood and affect, alert and orientated x 3,      ASSESSMENT:    ALL THE CARDIOLOGY PROBLEMS ARE LISTED ABOVE; HOWEVER, THE FOLLOWING SPECIFIC CARDIAC PROBLEMS / CONDITIONS WERE ADDRESSED AND TREATED DURING THE OFFICE VISIT TODAY:                                                                                            MEDICAL DECISION MAKING             Cardiac Specific Problem / Diagnosis  Discussion and Data Reviewed Diagnostic Procedures Ordered Management Options Selected           1. Shortness of breath with exertion  Chief complaint   Review and summation of old records:    O2 sat in the office 92%. 2D echo was ordered by primary care provider's office. Scheduled for May 18. Have contacted department to see if this could be moved up. We will need to address current EF and for any valvular heart disease. Yes Continue current medications:     Yes           2. Chest discomfort  Chief complaint   Primary care provider's office has ordered a Adonis Vieira. This is scheduled for 5/18/2022. Have already written order for stat to get that sooner. Patient is on aspirin and Zocor. Yes Continue current medications:    Yes           3. Ischemic cardiomyopathy/ICD Stable  no overt signs of failure. Interrogation in the office today battery status 10.1 years. Lead impedance stable. Thresholds stable. Mode VVI lower rate 40 bpm.  2 episodes of atrial fibs seen in February. None since. V sensed 99.8%. V paced 0.2%. OptiVol stable. [facility] 3 months. Patient is on lisinopril, Lasix and Zaroxolyn. Will order BMP to check electrolytes. Yes Continue current medications:       Yes           4. Hypertension Well Controlled  blood pressure 112/72. Patient is on lisinopril 5 mg twice daily. No Continue current medications:       Yes     Orders Placed This Encounter   Procedures    NM MYOCARDIAL SPECT REST EXERCISE OR RX     Standing Status:   Future     Standing Expiration Date:   4/22/2023     Order Specific Question:   Reason for Exam?     Answer:   Chest pain     Order Specific Question:   Procedure Type     Answer:   Rx     Order Specific Question:   Reason for exam:     Answer:   Chest pain; dyspnea on exertion    Basic Metabolic Panel     Standing Status:   Future     Standing Expiration Date:   4/22/2023     No orders of the defined types were placed in this encounter. Discussed with patient and spouse.     Return in about 2 weeks (around 5/6/2022) for Dr Jose Maria Lawrence (after 2D echo and [employer]) . I greatly appreciate the opportunity to meet Negrita Martinez and your confidence in allowing me to participate in his cardiovascular care. AMPARO Tobias NP  4/22/2022 9:00 AM CDT                    This dictation was generated by voice recognition computer software. Although all attempts are made to edit dictation for accuracy, there may be errors in the transcription that are not intended.

## 2022-04-22 NOTE — PATIENT INSTRUCTIONS
[place] at the [address] [facility] and [address] [facility] located on the first floor of [place] [address] through hospital main entrance and turn immediately to your left. Patient's contact number:  [phone] (home)      Lexiscan Stress Test      Lexiscan (regadenoson injection) is a prescription drug given through an IV line that increases blood flow through the arteries of the heart during a cardiac nuclear stress test.     There are two parts to a Lexiscan stress test: the rest portion and the exercise portion. For the rest portion, a radioactive tracer is injected into your arm through the IV. After 30 to 60 minutes, the process of imaging will begin. A nuclear camera will be placed on your chest area and images are taken for the next 15 to 20 minutes. For the exercise portion, a nurse will attach EKG electrodes to your chest to monitor your heart rate. The drug [place] is administered to simulate stress on the heart. Your heart rhythm will then be monitored for the next few minutes. Your blood pressure will also be monitored throughout the exercise portion. [place] through the exercise portion, a second round of radioactive tracer is injected into your body. Your heart rate and EKG will be monitored for another few minutes after administering the drug. Test Preparation:     Bring a list of your current medications. Do not take any of your medications the morning of the test, but bring all morning medications with you as you will take them after the stress portion of the test is completed.  Do not eat Bananas 24 hours prior to test.     No caffeine 24 hours prior to the testing. This includes: coffee, pop/soda, chocolate, cold medications, etc.  Any product that might contain caffeine.  No nicotine or alcohol 12 hours prior to your test.    Nothing to eat or drink 6-8 hours prior to appointment time.   It is okay to drink small amounts of water during the four hours prior to the test.   Nitroglycerin patches must be taken off 1 hour before testing.  Wear comfortable clothing.  Please refrain from any strenuous exercise or activities the day before your test, or the day of your test.   The Nuclear Lexiscan Stress test takes about 2 ½ to 3 hours to complete. If for any reason you are unable to keep this appointment, please contact Outpatient Scheduling, [phone], as soon as possible to reschedule.

## 2022-04-25 ENCOUNTER — HOSPITAL ENCOUNTER (OUTPATIENT)
Dept: NON INVASIVE DIAGNOSTICS | Age: 77
Discharge: HOME | End: 2022-04-25
Payer: MEDICARE

## 2022-04-25 ENCOUNTER — HOSPITAL ENCOUNTER (OUTPATIENT)
Dept: NUCLEAR MEDICINE | Age: 77
Discharge: HOME | End: 2022-04-27
Payer: MEDICARE

## 2022-04-25 DIAGNOSIS — I25.5 ISCHEMIC CARDIOMYOPATHY: Primary | ICD-10-CM

## 2022-04-25 DIAGNOSIS — I25.5 ISCHEMIC CARDIOMYOPATHY: ICD-10-CM

## 2022-04-25 DIAGNOSIS — R07.89 OTHER CHEST PAIN: ICD-10-CM

## 2022-04-25 LAB
ANION GAP SERPL CALCULATED.3IONS-SCNC: 11 MMOL/L (ref 7–19)
BUN BLDV-MCNC: 62 MG/DL (ref 8–23)
CALCIUM SERPL-MCNC: 10.3 MG/DL (ref 8.8–10.2)
CHLORIDE BLD-SCNC: 100 MMOL/L (ref 98–111)
CO2: 29 MMOL/L (ref 22–29)
CREAT SERPL-MCNC: 2 MG/DL (ref 0.5–1.2)
GFR AFRICAN AMERICAN: 39
GFR NON-AFRICAN AMERICAN: 33
GLUCOSE BLD-MCNC: 105 MG/DL (ref 74–109)
LV EF: 45 %
LVEF MODALITY: NORMAL
POTASSIUM SERPL-SCNC: 5.5 MMOL/L (ref 3.5–5)
SODIUM BLD-SCNC: 140 MMOL/L (ref 136–145)

## 2022-04-25 PROCEDURE — 3430000000 HC RX DIAGNOSTIC RADIOPHARMACEUTICAL: Performed by: NURSE PRACTITIONER

## 2022-04-25 PROCEDURE — A9502 TC99M TETROFOSMIN: HCPCS | Performed by: NURSE PRACTITIONER

## 2022-04-25 PROCEDURE — C8929 TTE W OR WO FOL WCON,DOPPLER: HCPCS

## 2022-04-25 PROCEDURE — 6360000002 HC RX W HCPCS: Performed by: NURSE PRACTITIONER

## 2022-04-25 PROCEDURE — 93017 CV STRESS TEST TRACING ONLY: CPT

## 2022-04-25 PROCEDURE — 6360000004 HC RX CONTRAST MEDICATION: Performed by: INTERNAL MEDICINE

## 2022-04-25 RX ADMIN — PERFLUTREN 1.5 ML: 6.52 INJECTION, SUSPENSION INTRAVENOUS at 09:28

## 2022-04-25 RX ADMIN — TETROFOSMIN 8 MILLICURIE: 1.38 INJECTION, POWDER, LYOPHILIZED, FOR SOLUTION INTRAVENOUS at 13:25

## 2022-04-25 RX ADMIN — REGADENOSON 0.4 MG: 0.08 INJECTION, SOLUTION INTRAVENOUS at 11:10

## 2022-04-25 RX ADMIN — TETROFOSMIN 24 MILLICURIE: 1.38 INJECTION, POWDER, LYOPHILIZED, FOR SOLUTION INTRAVENOUS at 13:25

## 2022-04-25 NOTE — RESULT ENCOUNTER NOTE
Please let patient know of BMP results. His potassium is elevated today at 5.5. Looks like he is taking a potassium supplement daily. I like for him to hold that for the next 2 days and repeat a BMP.   Order has been put in

## 2022-04-26 LAB
LV EF: 59 %
LVEF MODALITY: NORMAL

## 2022-04-26 PROCEDURE — 93016 CV STRESS TEST SUPVJ ONLY: CPT | Performed by: INTERNAL MEDICINE

## 2022-04-26 PROCEDURE — 93018 CV STRESS TEST I&R ONLY: CPT | Performed by: INTERNAL MEDICINE

## 2022-04-26 PROCEDURE — 78452 HT MUSCLE IMAGE SPECT MULT: CPT | Performed by: INTERNAL MEDICINE

## 2022-04-29 ENCOUNTER — TELEPHONE (OUTPATIENT)
Dept: CARDIOLOGY CLINIC | Age: 77
End: 2022-04-29

## 2022-04-29 DIAGNOSIS — I25.5 ISCHEMIC CARDIOMYOPATHY: ICD-10-CM

## 2022-04-29 DIAGNOSIS — R79.89 CREATININE ELEVATION: Primary | ICD-10-CM

## 2022-04-29 LAB
ANION GAP SERPL CALCULATED.3IONS-SCNC: 12 MMOL/L (ref 7–19)
BUN BLDV-MCNC: 59 MG/DL (ref 8–23)
CALCIUM SERPL-MCNC: 9.6 MG/DL (ref 8.8–10.2)
CHLORIDE BLD-SCNC: 100 MMOL/L (ref 98–111)
CO2: 28 MMOL/L (ref 22–29)
CREAT SERPL-MCNC: 2.2 MG/DL (ref 0.5–1.2)
GFR AFRICAN AMERICAN: 35
GFR NON-AFRICAN AMERICAN: 29
GLUCOSE BLD-MCNC: 183 MG/DL (ref 74–109)
POTASSIUM SERPL-SCNC: 4.9 MMOL/L (ref 3.5–5)
SODIUM BLD-SCNC: 140 MMOL/L (ref 136–145)

## 2022-04-29 NOTE — TELEPHONE ENCOUNTER
Amada Fam requests that [employer] return his call. The best time to reach him is Anytime. Patient called today returning a call from [employer]. Thank you.

## 2022-04-29 NOTE — TELEPHONE ENCOUNTER
Called patient and went over results with him. Spoke with him and let him know I am putting in a ref to nephrology.

## 2022-04-29 NOTE — TELEPHONE ENCOUNTER
Called and left message for patient to call back for results. If patient calls back please make sure patient is not already seeing nephrology?   ----- Message from AMPARO Wolff NP sent at 4/29/2022  9:58 AM CDT -----  Let patient know that potassium level is within normal limits. Please get patient a referral to nephrology as well.

## 2022-05-06 ENCOUNTER — TELEPHONE (OUTPATIENT)
Dept: CARDIOLOGY CLINIC | Age: 77
End: 2022-05-06

## 2022-05-06 NOTE — TELEPHONE ENCOUNTER
Kezia Medina requests that office return their call. The best time to reach him is Anytime. Patient states he is checking the status on referral to the kidney doctor. He would like to speak to someone to see when he can get scheduled. Thank you.

## 2022-05-09 ENCOUNTER — TELEPHONE (OUTPATIENT)
Dept: CARDIOLOGY CLINIC | Age: 77
End: 2022-05-09

## 2022-05-09 NOTE — TELEPHONE ENCOUNTER
Bard Cisneros requests that clinic return his call. The best time to reach him is anytime. Bard Cisneros would like to know the status of his referral to Nephrology . Thank you.

## 2022-05-18 ENCOUNTER — APPOINTMENT (OUTPATIENT)
Dept: NON INVASIVE DIAGNOSTICS | Age: 77
End: 2022-05-18
Payer: MEDICARE

## 2022-05-26 ENCOUNTER — OFFICE VISIT (OUTPATIENT)
Dept: CARDIOLOGY CLINIC | Age: 77
End: 2022-05-26
Payer: MEDICARE

## 2022-05-26 VITALS
HEART RATE: 70 BPM | DIASTOLIC BLOOD PRESSURE: 76 MMHG | BODY MASS INDEX: 29.89 KG/M2 | SYSTOLIC BLOOD PRESSURE: 124 MMHG | WEIGHT: 186 LBS | HEIGHT: 66 IN | OXYGEN SATURATION: 90 %

## 2022-05-26 DIAGNOSIS — I25.10 ATHEROSCLEROSIS OF NATIVE CORONARY ARTERY OF NATIVE HEART WITHOUT ANGINA PECTORIS: ICD-10-CM

## 2022-05-26 DIAGNOSIS — I25.5 ISCHEMIC CARDIOMYOPATHY: ICD-10-CM

## 2022-05-26 DIAGNOSIS — E78.2 MIXED HYPERLIPIDEMIA: ICD-10-CM

## 2022-05-26 DIAGNOSIS — R06.02 SHORTNESS OF BREATH: ICD-10-CM

## 2022-05-26 DIAGNOSIS — I27.20 SEVERE PULMONARY HYPERTENSION (HCC): ICD-10-CM

## 2022-05-26 DIAGNOSIS — I25.5 ISCHEMIC CARDIOMYOPATHY: Primary | ICD-10-CM

## 2022-05-26 DIAGNOSIS — I10 ESSENTIAL HYPERTENSION: ICD-10-CM

## 2022-05-26 DIAGNOSIS — R94.39 ABNORMAL NUCLEAR STRESS TEST: ICD-10-CM

## 2022-05-26 DIAGNOSIS — I50.22 CHRONIC SYSTOLIC (CONGESTIVE) HEART FAILURE (HCC): ICD-10-CM

## 2022-05-26 DIAGNOSIS — I48.0 PAF (PAROXYSMAL ATRIAL FIBRILLATION) (HCC): Primary | ICD-10-CM

## 2022-05-26 DIAGNOSIS — Z95.810 ICD (IMPLANTABLE CARDIOVERTER-DEFIBRILLATOR) IN PLACE: ICD-10-CM

## 2022-05-26 DIAGNOSIS — N18.9 CHRONIC KIDNEY DISEASE, UNSPECIFIED CKD STAGE: Primary | ICD-10-CM

## 2022-05-26 DIAGNOSIS — Z95.1 S/P CABG (CORONARY ARTERY BYPASS GRAFT): ICD-10-CM

## 2022-05-26 DIAGNOSIS — R06.09 DOE (DYSPNEA ON EXERTION): ICD-10-CM

## 2022-05-26 PROCEDURE — 1036F TOBACCO NON-USER: CPT | Performed by: INTERNAL MEDICINE

## 2022-05-26 PROCEDURE — 93290 INTERROG DEV EVAL ICPMS IP: CPT | Performed by: INTERNAL MEDICINE

## 2022-05-26 PROCEDURE — G8417 CALC BMI ABV UP PARAM F/U: HCPCS | Performed by: INTERNAL MEDICINE

## 2022-05-26 PROCEDURE — 99214 OFFICE O/P EST MOD 30 MIN: CPT | Performed by: INTERNAL MEDICINE

## 2022-05-26 PROCEDURE — G8427 DOCREV CUR MEDS BY ELIG CLIN: HCPCS | Performed by: INTERNAL MEDICINE

## 2022-05-26 PROCEDURE — 1123F ACP DISCUSS/DSCN MKR DOCD: CPT | Performed by: INTERNAL MEDICINE

## 2022-05-26 PROCEDURE — 93282 PRGRMG EVAL IMPLANTABLE DFB: CPT | Performed by: INTERNAL MEDICINE

## 2022-05-26 ASSESSMENT — ENCOUNTER SYMPTOMS
NAUSEA: 0
RESPIRATORY NEGATIVE: 1
VOMITING: 0
GASTROINTESTINAL NEGATIVE: 1
EYES NEGATIVE: 1
SHORTNESS OF BREATH: 0
DIARRHEA: 0

## 2022-05-26 NOTE — PROGRESS NOTES
ICD interrogated  Presenting rhythm: VS,  <0.1 %  Battery status: 10 years  Lead status: lead impedance within range and stable  Sensing:  R waves 5.9 mV  Thresholds:  Ventricular 1.000 V @ 0.4ms  Observations:  Optivol elevated from baseline since 05/07/22  Reprogramming for sensitivity and threshold testing  Next [facility] home check scheduled for 08/26/22

## 2022-05-26 NOTE — PROGRESS NOTES
in the last 72 hours. FASTING LIPID PANEL:  Lab Results   Component Value Date    HDL 46 08/05/2021    LDLDIRECT 69 04/27/2014    LDLCALC 31 08/05/2021    TRIG 65 08/05/2021     LIVER PROFILE:No results for input(s): AST, ALT, LABALBU in the last 72 hours. Past Medical History:   Diagnosis Date    Acute myocardial infarction of anterolateral wall, subsequent episode of care [facility]) 12/25/2011    AICD discharge     Allergies     Arthritis     CAD (coronary artery disease) of artery bypass graft     Cardiomyopathy, ischemic 5/25/2011    Coronary atherosclerosis of native coronary artery     Elevated PSA     Hyperlipidemia     Hyperlipidemia     Cholesterol management per Faiza Figueredo M.D.    Hypertension     ICD (implantable cardiac defibrillator) 5/25/2011    Rapid atrial fibrillation (HCC)     Shortness of breath     Type II or unspecified type diabetes mellitus without mention of complication, not stated as uncontrolled      Past Surgical History:   Procedure Laterality Date    APPENDECTOMY      ARM SURGERY Left 10/14/2019    EXCISION LIPOMA LEFT ARM performed by Victoriano Aggarwal MD at [address] [place] [address]  4/29/14  [facility]    2 vessel CAD.  EF 45%    CARDIAC PACEMAKER PLACEMENT      medtronic     CHOLECYSTECTOMY, LAPAROSCOPIC N/A 9/26/2016    CHOLECYSTECTOMY LAPAROSCOPIC performed by Quita Lucas MD at [address] [facility] GRAFT  01/03/2011    2V CABG (LIMA - LAD, SVG - PLM)    DIAGNOSTIC CARDIAC CATH LAB PROCEDURE  12/25/10    left heart cath, selective coronary arteriography, left ventriculography, direct infarct stenting to the left anterior descending coronary artery, left iliac arteriography, right iliac arteriography via left common femoral artery     FINGER AMPUTATION      traumatic left index finger    HERNIA REPAIR      ventral    MIDDLE EAR SURGERY Right 1966    tumor removed, some hearing loss     PACEMAKER PLACEMENT      MEDTRONIC PACEMAKER/DEFIB    OH COLONOSCOPY W/BIOPSY SINGLE/MULTIPLE N/A 4/3/2017    Dr JOSE Herrera-diverticular disease, benign ulcerated inflammatory polyp demonstrating a granulation tissue response--1 yr recall    [place] EGD TRANSORAL BIOPSY SINGLE/MULTIPLE N/A 4/3/2017    Dr Bernabe Carmona hiatal hernia, gastritis, chronic inflammation     Family History   Adopted: Yes   Problem Relation Age of Onset    Diabetes Other     Heart Disease Other      Allergies   Allergen Reactions    Ambien [Zolpidem]      Unknown      Lortab [Hydrocodone-Acetaminophen] Other (See Comments)     One tab daily caused bowel obstruction    Phenergan  [Promethazine Hcl] Rash     Current Outpatient Medications   Medication Sig Dispense Refill    furosemide (LASIX) 40 MG tablet Take ONE (1) tablet by mouth daily 60 tablet 5    metOLazone (ZAROXOLYN) 2.5 MG tablet Take ONE (1) tablet by mouth Twice A Week ON MONDAYS AND THURSDAYS ONLY 30 tablet 3    polyethylene glycol (GLYCOLAX) 17 GM/SCOOP powder Take 17 g by mouth daily      montelukast (SINGULAIR) 10 MG tablet Take 10 mg by mouth nightly      cetirizine (ZYRTEC ALLERGY) 10 MG tablet Zyrtec 10 mg tablet   Take 1 tablet every day by oral route.       OXYGEN Inhale 4 L into the lungs nightly       lisinopril (PRINIVIL;ZESTRIL) 5 MG tablet Take 1 tablet by mouth 2 times daily (Patient taking differently: Take 2.5 mg by mouth daily ) 60 tablet 3    Multiple Vitamins-Minerals (THERAPEUTIC MULTIVITAMIN-MINERALS) tablet Take 1 tablet by mouth daily      acetaminophen (TYLENOL) 325 MG tablet Take 650 mg by mouth every 6 hours as needed for Pain      aspirin EC 81 MG EC tablet Take 81 mg by mouth daily      nitroGLYCERIN (NITROSTAT) 0.4 MG SL tablet Place 1 tablet under the tongue every 5 minutes as needed for Chest pain 25 tablet 3    blood glucose test strips (EXACTECH TEST) strip USE 1 STRIP FOR BLOOD TEST TWICE A DAY ALTERNATING TIMES EACH DAY *STABLE INSULIN THERAPY*     DEC 81,3133     [facility] sotalol (BETAPACE) 80 MG tablet Take 80 mg by mouth 2 times daily       budesonide-formoterol (SYMBICORT) 160-4.5 MCG/ACT AERO Inhale 2 puffs into the lungs 2 times daily as needed       escitalopram (LEXAPRO) 10 MG tablet Take 10 mg by mouth daily      metFORMIN (GLUCOPHAGE) 500 MG tablet Take 500 mg by mouth 2 times daily (with meals)       PROAIR  (90 Base) MCG/ACT inhaler Inhale 2 puffs into the lungs as needed for Shortness of Breath   1    potassium chloride (KLOR-CON M) 20 MEQ extended release tablet Take 10 mEq by mouth daily   2    simvastatin (ZOCOR) 80 MG tablet Take 40 mg by mouth nightly       pantoprazole (PROTONIX) 40 MG tablet Take 40 mg by mouth daily.  mirtazapine (REMERON SOL-TAB) 30 MG disintegrating tablet Take 30 mg by mouth nightly       bumetanide (BUMEX) 2 MG tablet Take 2 mg by mouth three times a week Indications: Saturday and Tuesday Monday, Wednesday and Friday. (Patient not taking: Reported on 5/26/2022)       No current facility-administered medications for this visit.      Social History     Socioeconomic History    Marital status:      Spouse name: Not on file    Number of children: Not on file    Years of education: Not on file    Highest education level: Not on file   Occupational History    Not on file   Tobacco Use    Smoking status: Never Smoker    Smokeless tobacco: Former User     Types: Chew   Vaping Use    Vaping Use: Never used   Substance and Sexual Activity    Alcohol use: No     Alcohol/week: 0.0 standard drinks    Drug use: No    Sexual activity: Not on file   Other Topics Concern    Not on file   Social History Narrative    Not on file     Social Determinants of Health     Financial Resource Strain:     Difficulty of Paying Living Expenses: Not on file   Food Insecurity:     Worried About [address] in the Last Year: Not on file    [address] in the Last Year: Not on file   Transportation Needs:     Lack of Transportation (Medical): Not on file    Lack of Transportation (Non-Medical): Not on file   Physical Activity:     Days of Exercise per Week: Not on file    Minutes of Exercise per Session: Not on file   Stress:     Feeling of Stress : Not on file   Social Connections:     Frequency of Communication with Friends and Family: Not on file    Frequency of Social Gatherings with Friends and Family: Not on file    Attends Confucianist Services: Not on file    Active Member of [employer] or Organizations: Not on file    Attends Club or Organization Meetings: Not on file    Marital Status: Not on file   Intimate Partner Violence:     Fear of Current or Ex-Partner: Not on file    Emotionally Abused: Not on file    Physically Abused: Not on file    Sexually Abused: Not on file   Housing Stability:     Unable to Pay for Housing in the Last Year: Not on file    Number of [place] in the Last Year: Not on file    Unstable Housing in the Last Year: Not on file       Physical Examination:  /76 (Site: Right Upper Arm, Position: Sitting)   Pulse 70   Ht 5' 6\" (1.676 m)   Wt 186 lb (84.4 kg)   SpO2 90%   BMI 30.02 kg/m²   Physical Exam  Vitals reviewed. Constitutional:       Appearance: He is well-developed. Neck:      Vascular: No carotid bruit or JVD. Cardiovascular:      Rate and Rhythm: Normal rate and regular rhythm. Heart sounds: Normal heart sounds. No murmur heard. No friction rub. No gallop. Pulmonary:      Effort: Pulmonary effort is normal. No respiratory distress. Breath sounds: Normal breath sounds. No wheezing or rales. Abdominal:      General: There is no distension. Tenderness: There is no abdominal tenderness. Lymphadenopathy:      Cervical: No cervical adenopathy. Skin:     General: Skin is warm and dry. ASSESSMENT:     Diagnosis Orders   1. PAF (paroxysmal atrial fibrillation) ([facility]     2. Severe pulmonary hypertension ([facility]     3.  Chronic systolic (congestive) heart failure ([facility]     4. Mixed hyperlipidemia     5. Essential hypertension     6. Ischemic cardiomyopathy     7. PARKS (dyspnea on exertion)     8. S/P CABG (coronary artery bypass graft)     9. Abnormal nuclear stress test     10. Atherosclerosis of native coronary artery of native heart without angina pectoris     11. ICD (implantable cardioverter-defibrillator) in place         PLAN:  No orders of the defined types were placed in this encounter. No orders of the defined types were placed in this encounter. 1. Continue present medications  2. Resume Lasix 40 mg daily  3. Resume metolazone 2 times weekly  4. Labs today as ordered  5. Recommend appointment with nephrologist as soon as feasible hopefully in the next week or so. 6. Recommend follow-up assessment in 3 months    Return in about 3 months (around 8/26/2022) for return to Dr. Michaeline Kawasaki only. Chiara Martínez MD 5/26/2022 11:50 AM CDT    [facility]      Thisdictation was generated by voice recognition computer software. Although all attempts are made to edit the dictation for accuracy, there may be errors in the transcription that are not intended.

## 2022-05-27 DIAGNOSIS — R06.02 SHORTNESS OF BREATH: ICD-10-CM

## 2022-05-27 DIAGNOSIS — I25.5 ISCHEMIC CARDIOMYOPATHY: ICD-10-CM

## 2022-05-27 DIAGNOSIS — E78.2 MIXED HYPERLIPIDEMIA: ICD-10-CM

## 2022-05-27 DIAGNOSIS — I25.10 ATHEROSCLEROSIS OF NATIVE CORONARY ARTERY OF NATIVE HEART WITHOUT ANGINA PECTORIS: ICD-10-CM

## 2022-05-27 DIAGNOSIS — N18.9 CHRONIC KIDNEY DISEASE, UNSPECIFIED CKD STAGE: ICD-10-CM

## 2022-05-27 LAB
ANION GAP SERPL CALCULATED.3IONS-SCNC: 14 MMOL/L (ref 7–19)
BUN BLDV-MCNC: 57 MG/DL (ref 8–23)
CALCIUM SERPL-MCNC: 9.8 MG/DL (ref 8.8–10.2)
CHLORIDE BLD-SCNC: 101 MMOL/L (ref 98–111)
CO2: 27 MMOL/L (ref 22–29)
CREAT SERPL-MCNC: 1.9 MG/DL (ref 0.5–1.2)
GFR AFRICAN AMERICAN: 42
GFR NON-AFRICAN AMERICAN: 35
GLUCOSE BLD-MCNC: 115 MG/DL (ref 74–109)
POTASSIUM SERPL-SCNC: 4.2 MMOL/L (ref 3.5–5)
PRO-BNP: ABNORMAL PG/ML (ref 0–1800)
SODIUM BLD-SCNC: 142 MMOL/L (ref 136–145)

## 2022-06-20 ENCOUNTER — HOSPITAL ENCOUNTER (OUTPATIENT)
Dept: ULTRASOUND IMAGING | Age: 77
Discharge: HOME | End: 2022-06-20
Payer: MEDICARE

## 2022-06-20 DIAGNOSIS — N18.4 CHRONIC RENAL DISEASE, STAGE IV (HCC): ICD-10-CM

## 2022-06-20 PROCEDURE — 76770 US EXAM ABDO BACK WALL COMP: CPT | Performed by: RADIOLOGY

## 2022-06-20 PROCEDURE — 76770 US EXAM ABDO BACK WALL COMP: CPT

## 2022-07-19 ENCOUNTER — HOSPITAL ENCOUNTER (OUTPATIENT)
Dept: MRI IMAGING | Age: 77
Discharge: HOME | End: 2022-07-19
Payer: MEDICARE

## 2022-07-19 ENCOUNTER — APPOINTMENT (OUTPATIENT)
Dept: MRI IMAGING | Age: 77
End: 2022-07-19
Payer: MEDICARE

## 2022-07-19 DIAGNOSIS — R74.8 ABNORMAL LEVELS OF OTHER SERUM ENZYMES: ICD-10-CM

## 2022-07-19 PROCEDURE — 74181 MRI ABDOMEN W/O CONTRAST: CPT

## 2022-07-19 PROCEDURE — 74181 MRI ABDOMEN W/O CONTRAST: CPT | Performed by: RADIOLOGY

## 2022-07-26 ENCOUNTER — TELEPHONE (OUTPATIENT)
Dept: PULMONOLOGY | Age: 77
End: 2022-07-26

## 2022-07-26 NOTE — TELEPHONE ENCOUNTER
Per Dr. Corazon Cid it is okay for the patient to have his O2 at 4.5L. I called patient's wife back and let her know. She reports patient is doing much better.

## 2022-07-26 NOTE — TELEPHONE ENCOUNTER
Patient's wife called stating that yesterday they turned her 's oxygen saturation up from 3.5 to 4.5 Liters. She said Sunday he was having some trouble breathing and they turned it up a little but yesterday they turned it up to 4.5 and he is doing better now. Patient's wife would like to know if it is okay to keep at 4.5, do they need to come in to be seen?       Please Advise

## 2022-08-11 ENCOUNTER — HOSPITAL ENCOUNTER (INPATIENT)
Age: 77
LOS: 1 days | DRG: 871 | End: 2022-08-12
Attending: EMERGENCY MEDICINE | Admitting: HOSPITALIST
Payer: MEDICARE

## 2022-08-11 ENCOUNTER — APPOINTMENT (OUTPATIENT)
Dept: GENERAL RADIOLOGY | Age: 77
DRG: 871 | End: 2022-08-11
Payer: MEDICARE

## 2022-08-11 DIAGNOSIS — J96.90: ICD-10-CM

## 2022-08-11 DIAGNOSIS — I50.9 ACUTE ON CHRONIC CONGESTIVE HEART FAILURE, UNSPECIFIED HEART FAILURE TYPE (HCC): Primary | ICD-10-CM

## 2022-08-11 DIAGNOSIS — I27.20 SEVERE PULMONARY HYPERTENSION (HCC): ICD-10-CM

## 2022-08-11 DIAGNOSIS — R77.8 ELEVATED TROPONIN: ICD-10-CM

## 2022-08-11 PROBLEM — A41.9 SEPTIC SHOCK (HCC): Status: ACTIVE | Noted: 2022-08-11

## 2022-08-11 PROBLEM — R65.21 SEPTIC SHOCK (HCC): Status: ACTIVE | Noted: 2022-08-11

## 2022-08-11 PROBLEM — R73.9 HYPERGLYCEMIA: Status: ACTIVE | Noted: 2022-08-11

## 2022-08-11 PROBLEM — E87.20 LACTIC ACIDOSIS: Status: ACTIVE | Noted: 2022-08-11

## 2022-08-11 PROBLEM — E83.41 HYPERMAGNESEMIA: Status: ACTIVE | Noted: 2022-08-11

## 2022-08-11 PROBLEM — E83.39 HYPERPHOSPHATEMIA: Status: ACTIVE | Noted: 2022-08-11

## 2022-08-11 PROBLEM — J96.01 ACUTE HYPOXEMIC RESPIRATORY FAILURE (HCC): Status: ACTIVE | Noted: 2022-08-11

## 2022-08-11 PROBLEM — D69.6 THROMBOCYTOPENIA, UNSPECIFIED (HCC): Status: RESOLVED | Noted: 2021-08-23 | Resolved: 2022-08-11

## 2022-08-11 LAB
ADENOVIRUS BY PCR: NOT DETECTED
ALLENS TEST: ABNORMAL
BACTERIA: NEGATIVE /HPF
BASE EXCESS ARTERIAL: -1 MMOL/L (ref -2–2)
BASOPHILS ABSOLUTE: 0 K/UL (ref 0–0.2)
BASOPHILS RELATIVE PERCENT: 0.4 % (ref 0–1)
BILIRUBIN URINE: ABNORMAL
BLOOD, URINE: NEGATIVE
BORDETELLA PARAPERTUSSIS BY PCR: NOT DETECTED
BORDETELLA PERTUSSIS BY PCR: NOT DETECTED
CARBOXYHEMOGLOBIN ARTERIAL: 1.8 % (ref 0–5)
CHLAMYDOPHILIA PNEUMONIAE BY PCR: NOT DETECTED
CHOLESTEROL, TOTAL: 196 MG/DL (ref 160–199)
CLARITY: CLEAR
COLOR: ABNORMAL
CORONAVIRUS 229E BY PCR: NOT DETECTED
CORONAVIRUS HKU1 BY PCR: NOT DETECTED
CORONAVIRUS NL63 BY PCR: NOT DETECTED
CORONAVIRUS OC43 BY PCR: NOT DETECTED
CRYSTALS, UA: ABNORMAL /HPF
EOSINOPHILS ABSOLUTE: 0 K/UL (ref 0–0.6)
EOSINOPHILS RELATIVE PERCENT: 0.1 % (ref 0–5)
EPITHELIAL CELLS, UA: 2 /HPF (ref 0–5)
FIO2: 100 %
GLUCOSE URINE: NEGATIVE MG/DL
HBA1C MFR BLD: 8 % (ref 4–6)
HCO3 ARTERIAL: 20.6 MMOL/L (ref 22–26)
HCT VFR BLD CALC: 56.4 % (ref 42–52)
HDLC SERPL-MCNC: 40 MG/DL (ref 55–121)
HEMOGLOBIN, ART, EXTENDED: 17.2 G/DL (ref 14–18)
HEMOGLOBIN: 17.9 G/DL (ref 14–18)
HUMAN METAPNEUMOVIRUS BY PCR: NOT DETECTED
HUMAN RHINOVIRUS/ENTEROVIRUS BY PCR: NOT DETECTED
HYALINE CASTS: 8 /HPF (ref 0–8)
IMMATURE GRANULOCYTES #: 0.1 K/UL
INFLUENZA A BY PCR: NOT DETECTED
INFLUENZA B BY PCR: NOT DETECTED
KETONES, URINE: ABNORMAL MG/DL
L. PNEUMOPHILA SEROGP 1 UR AG: NORMAL
LACTIC ACID: 3 MMOL/L (ref 0.5–1.9)
LDL CHOLESTEROL CALCULATED: 105 MG/DL
LEUKOCYTE ESTERASE, URINE: NEGATIVE
LYMPHOCYTES ABSOLUTE: 1.1 K/UL (ref 1.1–4.5)
LYMPHOCYTES RELATIVE PERCENT: 11.6 % (ref 20–40)
MAGNESIUM: 2.5 MG/DL (ref 1.6–2.4)
MCH RBC QN AUTO: 28.4 PG (ref 27–31)
MCHC RBC AUTO-ENTMCNC: 31.7 G/DL (ref 33–37)
MCV RBC AUTO: 89.4 FL (ref 80–94)
METHEMOGLOBIN ARTERIAL: 1.2 %
MONOCYTES ABSOLUTE: 0.7 K/UL (ref 0–0.9)
MONOCYTES RELATIVE PERCENT: 7 % (ref 0–10)
MYCOPLASMA PNEUMONIAE BY PCR: NOT DETECTED
NEUTROPHILS ABSOLUTE: 7.6 K/UL (ref 1.5–7.5)
NEUTROPHILS RELATIVE PERCENT: 80.3 % (ref 50–65)
NITRITE, URINE: NEGATIVE
O2 CONTENT ARTERIAL: 21 ML/DL
O2 DELIVERY DEVICE: ABNORMAL
O2 SAT, ARTERIAL: 87.2 %
O2 THERAPY: ABNORMAL
OXYGEN FLOW: 15
PARAINFLUENZA VIRUS 1 BY PCR: NOT DETECTED
PARAINFLUENZA VIRUS 2 BY PCR: NOT DETECTED
PARAINFLUENZA VIRUS 3 BY PCR: NOT DETECTED
PARAINFLUENZA VIRUS 4 BY PCR: NOT DETECTED
PCO2 ARTERIAL: 27 MMHG (ref 35–45)
PDW BLD-RTO: 16.3 % (ref 11.5–14.5)
PH ARTERIAL: 7.49 (ref 7.35–7.45)
PH UA: 5 (ref 5–8)
PHOSPHORUS: 5.4 MG/DL (ref 2.5–4.5)
PLATELET # BLD: 172 K/UL (ref 130–400)
PMV BLD AUTO: 11.6 FL (ref 9.4–12.4)
PO2 ARTERIAL: 49 MMHG (ref 80–100)
POTASSIUM, WHOLE BLOOD: 4.3
PRO-BNP: ABNORMAL PG/ML (ref 0–1800)
PROCALCITONIN: 0.06 NG/ML (ref 0–0.09)
PROTEIN UA: 100 MG/DL
RBC # BLD: 6.31 M/UL (ref 4.7–6.1)
RBC UA: 6 /HPF (ref 0–4)
REASON FOR REJECTION: NORMAL
REJECTED TEST: NORMAL
RESPIRATORY SYNCYTIAL VIRUS BY PCR: NOT DETECTED
SAMPLE SOURCE: ABNORMAL
SARS-COV-2, NAAT: NOT DETECTED
SARS-COV-2, PCR: NOT DETECTED
SPECIFIC GRAVITY UA: 1.02 (ref 1–1.03)
STREP PNEUMONIAE ANTIGEN, URINE: NORMAL
TRIGL SERPL-MCNC: 253 MG/DL (ref 0–149)
TROPONIN: 0.06 NG/ML (ref 0–0.03)
TROPONIN: 0.14 NG/ML (ref 0–0.03)
TSH REFLEX FT4: 2.52 UIU/ML (ref 0.35–5.5)
UROBILINOGEN, URINE: 1 E.U./DL
WBC # BLD: 9.5 K/UL (ref 4.8–10.8)
WBC UA: 1 /HPF (ref 0–5)

## 2022-08-11 PROCEDURE — 87449 NOS EACH ORGANISM AG IA: CPT

## 2022-08-11 PROCEDURE — 36415 COLL VENOUS BLD VENIPUNCTURE: CPT

## 2022-08-11 PROCEDURE — 2100000000 HC CCU R&B

## 2022-08-11 PROCEDURE — 36600 WITHDRAWAL OF ARTERIAL BLOOD: CPT

## 2022-08-11 PROCEDURE — 84443 ASSAY THYROID STIM HORMONE: CPT

## 2022-08-11 PROCEDURE — 82803 BLOOD GASES ANY COMBINATION: CPT

## 2022-08-11 PROCEDURE — 85025 COMPLETE CBC W/AUTO DIFF WBC: CPT

## 2022-08-11 PROCEDURE — 84145 PROCALCITONIN (PCT): CPT

## 2022-08-11 PROCEDURE — 0202U NFCT DS 22 TRGT SARS-COV-2: CPT

## 2022-08-11 PROCEDURE — 80053 COMPREHEN METABOLIC PANEL: CPT

## 2022-08-11 PROCEDURE — 6360000002 HC RX W HCPCS: Performed by: HOSPITALIST

## 2022-08-11 PROCEDURE — 83036 HEMOGLOBIN GLYCOSYLATED A1C: CPT

## 2022-08-11 PROCEDURE — 81001 URINALYSIS AUTO W/SCOPE: CPT

## 2022-08-11 PROCEDURE — 87635 SARS-COV-2 COVID-19 AMP PRB: CPT

## 2022-08-11 PROCEDURE — 87040 BLOOD CULTURE FOR BACTERIA: CPT

## 2022-08-11 PROCEDURE — 6360000002 HC RX W HCPCS: Performed by: NURSE PRACTITIONER

## 2022-08-11 PROCEDURE — 94660 CPAP INITIATION&MGMT: CPT

## 2022-08-11 PROCEDURE — 71045 X-RAY EXAM CHEST 1 VIEW: CPT

## 2022-08-11 PROCEDURE — 96360 HYDRATION IV INFUSION INIT: CPT

## 2022-08-11 PROCEDURE — 51702 INSERT TEMP BLADDER CATH: CPT

## 2022-08-11 PROCEDURE — 2700000000 HC OXYGEN THERAPY PER DAY

## 2022-08-11 PROCEDURE — 84100 ASSAY OF PHOSPHORUS: CPT

## 2022-08-11 PROCEDURE — 83605 ASSAY OF LACTIC ACID: CPT

## 2022-08-11 PROCEDURE — 93005 ELECTROCARDIOGRAM TRACING: CPT | Performed by: NURSE PRACTITIONER

## 2022-08-11 PROCEDURE — 83880 ASSAY OF NATRIURETIC PEPTIDE: CPT

## 2022-08-11 PROCEDURE — 94640 AIRWAY INHALATION TREATMENT: CPT

## 2022-08-11 PROCEDURE — 83735 ASSAY OF MAGNESIUM: CPT

## 2022-08-11 PROCEDURE — 80061 LIPID PANEL: CPT

## 2022-08-11 PROCEDURE — 96361 HYDRATE IV INFUSION ADD-ON: CPT

## 2022-08-11 PROCEDURE — 71045 X-RAY EXAM CHEST 1 VIEW: CPT | Performed by: RADIOLOGY

## 2022-08-11 PROCEDURE — 2580000003 HC RX 258: Performed by: NURSE PRACTITIONER

## 2022-08-11 PROCEDURE — 84484 ASSAY OF TROPONIN QUANT: CPT

## 2022-08-11 PROCEDURE — 99285 EMERGENCY DEPT VISIT HI MDM: CPT

## 2022-08-11 RX ORDER — CEFTRIAXONE 2 G/50ML
2000 INJECTION, SOLUTION INTRAVENOUS EVERY 24 HOURS
Status: DISCONTINUED | OUTPATIENT
Start: 2022-08-11 | End: 2022-08-12 | Stop reason: HOSPADM

## 2022-08-11 RX ORDER — MIRTAZAPINE 15 MG/1
30 TABLET, ORALLY DISINTEGRATING ORAL NIGHTLY
Status: DISCONTINUED | OUTPATIENT
Start: 2022-08-11 | End: 2022-08-12 | Stop reason: HOSPADM

## 2022-08-11 RX ORDER — POTASSIUM CHLORIDE 29.8 MG/ML
20 INJECTION INTRAVENOUS PRN
Status: DISCONTINUED | OUTPATIENT
Start: 2022-08-11 | End: 2022-08-12 | Stop reason: HOSPADM

## 2022-08-11 RX ORDER — DEXTROSE MONOHYDRATE 100 MG/ML
INJECTION, SOLUTION INTRAVENOUS CONTINUOUS PRN
Status: DISCONTINUED | OUTPATIENT
Start: 2022-08-11 | End: 2022-08-12 | Stop reason: HOSPADM

## 2022-08-11 RX ORDER — SODIUM CHLORIDE 0.9 % (FLUSH) 0.9 %
5-40 SYRINGE (ML) INJECTION PRN
Status: DISCONTINUED | OUTPATIENT
Start: 2022-08-11 | End: 2022-08-12 | Stop reason: HOSPADM

## 2022-08-11 RX ORDER — ENOXAPARIN SODIUM 100 MG/ML
40 INJECTION SUBCUTANEOUS DAILY
Status: DISCONTINUED | OUTPATIENT
Start: 2022-08-12 | End: 2022-08-12

## 2022-08-11 RX ORDER — POLYETHYLENE GLYCOL 3350 17 G/17G
17 POWDER, FOR SOLUTION ORAL DAILY PRN
Status: DISCONTINUED | OUTPATIENT
Start: 2022-08-11 | End: 2022-08-12 | Stop reason: SDUPTHER

## 2022-08-11 RX ORDER — ATORVASTATIN CALCIUM 40 MG/1
40 TABLET, FILM COATED ORAL NIGHTLY
Status: DISCONTINUED | OUTPATIENT
Start: 2022-08-11 | End: 2022-08-12 | Stop reason: HOSPADM

## 2022-08-11 RX ORDER — POLYETHYLENE GLYCOL 3350 17 G/17G
17 POWDER, FOR SOLUTION ORAL DAILY PRN
Status: DISCONTINUED | OUTPATIENT
Start: 2022-08-11 | End: 2022-08-12 | Stop reason: HOSPADM

## 2022-08-11 RX ORDER — CALCIUM CARBONATE 200(500)MG
500 TABLET,CHEWABLE ORAL 3 TIMES DAILY PRN
Status: DISCONTINUED | OUTPATIENT
Start: 2022-08-11 | End: 2022-08-12 | Stop reason: HOSPADM

## 2022-08-11 RX ORDER — 0.9 % SODIUM CHLORIDE 0.9 %
500 INTRAVENOUS SOLUTION INTRAVENOUS ONCE
Status: COMPLETED | OUTPATIENT
Start: 2022-08-11 | End: 2022-08-11

## 2022-08-11 RX ORDER — MONTELUKAST SODIUM 10 MG/1
10 TABLET ORAL NIGHTLY
Status: DISCONTINUED | OUTPATIENT
Start: 2022-08-11 | End: 2022-08-12 | Stop reason: HOSPADM

## 2022-08-11 RX ORDER — MAGNESIUM SULFATE IN WATER 40 MG/ML
2000 INJECTION, SOLUTION INTRAVENOUS PRN
Status: DISCONTINUED | OUTPATIENT
Start: 2022-08-11 | End: 2022-08-12 | Stop reason: HOSPADM

## 2022-08-11 RX ORDER — SODIUM CHLORIDE 0.9 % (FLUSH) 0.9 %
5-40 SYRINGE (ML) INJECTION PRN
Status: DISCONTINUED | OUTPATIENT
Start: 2022-08-11 | End: 2022-08-12 | Stop reason: SDUPTHER

## 2022-08-11 RX ORDER — POLYETHYLENE GLYCOL 3350 17 G/17G
17 POWDER, FOR SOLUTION ORAL DAILY
Status: DISCONTINUED | OUTPATIENT
Start: 2022-08-12 | End: 2022-08-12 | Stop reason: HOSPADM

## 2022-08-11 RX ORDER — POTASSIUM CHLORIDE 7.45 MG/ML
10 INJECTION INTRAVENOUS PRN
Status: DISCONTINUED | OUTPATIENT
Start: 2022-08-11 | End: 2022-08-12 | Stop reason: HOSPADM

## 2022-08-11 RX ORDER — ONDANSETRON 2 MG/ML
4 INJECTION INTRAMUSCULAR; INTRAVENOUS EVERY 6 HOURS PRN
Status: DISCONTINUED | OUTPATIENT
Start: 2022-08-11 | End: 2022-08-12 | Stop reason: HOSPADM

## 2022-08-11 RX ORDER — BUDESONIDE 0.5 MG/2ML
0.5 INHALANT ORAL EVERY 12 HOURS
Status: DISCONTINUED | OUTPATIENT
Start: 2022-08-11 | End: 2022-08-12 | Stop reason: HOSPADM

## 2022-08-11 RX ORDER — ACETAMINOPHEN 325 MG/1
650 TABLET ORAL EVERY 6 HOURS PRN
Status: DISCONTINUED | OUTPATIENT
Start: 2022-08-11 | End: 2022-08-12 | Stop reason: HOSPADM

## 2022-08-11 RX ORDER — SODIUM CHLORIDE 9 MG/ML
INJECTION, SOLUTION INTRAVENOUS PRN
Status: DISCONTINUED | OUTPATIENT
Start: 2022-08-11 | End: 2022-08-12 | Stop reason: SDUPTHER

## 2022-08-11 RX ORDER — METRONIDAZOLE 500 MG/100ML
500 INJECTION, SOLUTION INTRAVENOUS EVERY 8 HOURS
Status: DISCONTINUED | OUTPATIENT
Start: 2022-08-11 | End: 2022-08-12 | Stop reason: HOSPADM

## 2022-08-11 RX ORDER — ASPIRIN 81 MG/1
81 TABLET ORAL DAILY
Status: DISCONTINUED | OUTPATIENT
Start: 2022-08-12 | End: 2022-08-12 | Stop reason: HOSPADM

## 2022-08-11 RX ORDER — SODIUM CHLORIDE 0.9 % (FLUSH) 0.9 %
5-40 SYRINGE (ML) INJECTION EVERY 12 HOURS SCHEDULED
Status: DISCONTINUED | OUTPATIENT
Start: 2022-08-11 | End: 2022-08-12

## 2022-08-11 RX ORDER — DOPAMINE HYDROCHLORIDE 160 MG/100ML
1-20 INJECTION, SOLUTION INTRAVENOUS CONTINUOUS
Status: DISCONTINUED | OUTPATIENT
Start: 2022-08-11 | End: 2022-08-12

## 2022-08-11 RX ORDER — M-VIT,TX,IRON,MINS/CALC/FOLIC 27MG-0.4MG
1 TABLET ORAL DAILY
Status: DISCONTINUED | OUTPATIENT
Start: 2022-08-11 | End: 2022-08-12 | Stop reason: HOSPADM

## 2022-08-11 RX ORDER — ESCITALOPRAM OXALATE 10 MG/1
10 TABLET ORAL DAILY
Status: DISCONTINUED | OUTPATIENT
Start: 2022-08-12 | End: 2022-08-12 | Stop reason: HOSPADM

## 2022-08-11 RX ORDER — ARFORMOTEROL TARTRATE 15 UG/2ML
15 SOLUTION RESPIRATORY (INHALATION) 2 TIMES DAILY
Status: DISCONTINUED | OUTPATIENT
Start: 2022-08-11 | End: 2022-08-12 | Stop reason: HOSPADM

## 2022-08-11 RX ORDER — POTASSIUM CHLORIDE 750 MG/1
10 TABLET, EXTENDED RELEASE ORAL DAILY
Status: DISCONTINUED | OUTPATIENT
Start: 2022-08-12 | End: 2022-08-12 | Stop reason: HOSPADM

## 2022-08-11 RX ORDER — SODIUM CHLORIDE 9 MG/ML
INJECTION, SOLUTION INTRAVENOUS PRN
Status: DISCONTINUED | OUTPATIENT
Start: 2022-08-11 | End: 2022-08-12 | Stop reason: HOSPADM

## 2022-08-11 RX ORDER — NITROGLYCERIN 0.4 MG/1
0.4 TABLET SUBLINGUAL EVERY 5 MIN PRN
Status: DISCONTINUED | OUTPATIENT
Start: 2022-08-11 | End: 2022-08-12 | Stop reason: HOSPADM

## 2022-08-11 RX ORDER — PANTOPRAZOLE SODIUM 40 MG/1
40 TABLET, DELAYED RELEASE ORAL DAILY
Status: DISCONTINUED | OUTPATIENT
Start: 2022-08-12 | End: 2022-08-12 | Stop reason: HOSPADM

## 2022-08-11 RX ORDER — ACETAMINOPHEN 650 MG/1
650 SUPPOSITORY RECTAL EVERY 6 HOURS PRN
Status: DISCONTINUED | OUTPATIENT
Start: 2022-08-11 | End: 2022-08-12 | Stop reason: HOSPADM

## 2022-08-11 RX ORDER — ONDANSETRON 4 MG/1
4 TABLET, ORALLY DISINTEGRATING ORAL EVERY 8 HOURS PRN
Status: DISCONTINUED | OUTPATIENT
Start: 2022-08-11 | End: 2022-08-12 | Stop reason: HOSPADM

## 2022-08-11 RX ORDER — ALBUTEROL SULFATE 2.5 MG/3ML
2.5 SOLUTION RESPIRATORY (INHALATION) EVERY 4 HOURS PRN
Status: DISCONTINUED | OUTPATIENT
Start: 2022-08-11 | End: 2022-08-12 | Stop reason: HOSPADM

## 2022-08-11 RX ORDER — SODIUM CHLORIDE 0.9 % (FLUSH) 0.9 %
5-40 SYRINGE (ML) INJECTION EVERY 12 HOURS SCHEDULED
Status: DISCONTINUED | OUTPATIENT
Start: 2022-08-11 | End: 2022-08-12 | Stop reason: SDUPTHER

## 2022-08-11 RX ORDER — MECOBALAMIN 5000 MCG
5 TABLET,DISINTEGRATING ORAL NIGHTLY PRN
Status: DISCONTINUED | OUTPATIENT
Start: 2022-08-11 | End: 2022-08-12 | Stop reason: HOSPADM

## 2022-08-11 RX ADMIN — DOPAMINE HYDROCHLORIDE IN DEXTROSE 5 MCG/KG/MIN: 1.6 INJECTION, SOLUTION INTRAVENOUS at 20:03

## 2022-08-11 RX ADMIN — BUDESONIDE 500 MCG: 0.5 SUSPENSION RESPIRATORY (INHALATION) at 22:15

## 2022-08-11 RX ADMIN — CEFTRIAXONE 2000 MG: 2 INJECTION, SOLUTION INTRAVENOUS at 23:58

## 2022-08-11 RX ADMIN — ARFORMOTEROL TARTRATE 15 MCG: 15 SOLUTION RESPIRATORY (INHALATION) at 22:15

## 2022-08-11 RX ADMIN — SODIUM CHLORIDE 500 ML: 9 INJECTION, SOLUTION INTRAVENOUS at 17:00

## 2022-08-11 ASSESSMENT — ENCOUNTER SYMPTOMS: SHORTNESS OF BREATH: 1

## 2022-08-11 ASSESSMENT — PAIN SCALES - GENERAL: PAINLEVEL_OUTOF10: 0

## 2022-08-11 NOTE — ED PROVIDER NOTES
[facility] DE[facility]  eMERGENCY dEPARTMENT eNCOUnter      Pt Name: Tho Santos  MRN: [id number]  [place] 1945  Date of evaluation: 8/11/2022  Provider: Casie Johnson, [address]       Chief Complaint   Patient presents with    Fatigue    Hypotension         HISTORY OF PRESENT ILLNESS   (Location/Symptom, Timing/Onset,Context/Setting, Quality, Duration, Modifying Factors, Severity)  Note limiting factors. Tho Santos is a 68 y.o. male who presents to the emergency department with shortness of breath x 3 days. Pt has CHF, CAD, cardiomyopathy and pulmonary hypertension and is on O2 at home. They recently increased it up to 5 liters. Pt very fatigued and weak. NO chest pain. No fever  Is cold all the time    The history is provided by the patient and the spouse. Shortness of Breath  Severity:  Severe  Onset quality:  Unable to specify  Duration:  3 days  Timing:  Constant  Progression:  Worsening  Chronicity:  New  Relieved by:  Nothing  Worsened by:  Exertion  Ineffective treatments:  Oxygen  Associated symptoms: no chest pain and no fever      NursingNotes were reviewed. REVIEW OF SYSTEMS    (2-9 systems for level 4, 10 or more for level 5)     Review of Systems   Constitutional:  Positive for chills. Negative for fever. Respiratory:  Positive for shortness of breath. Cardiovascular:  Negative for chest pain. Except as noted above the remainder of the review of systems was reviewed and negative.        PAST MEDICAL HISTORY     Past Medical History:   Diagnosis Date    Acute myocardial infarction of anterolateral wall, subsequent episode of care ([facility] [address] 12/25/2011    AICD discharge     Allergies     Arthritis     CAD (coronary artery disease) of artery bypass graft     Cardiomyopathy, ischemic 5/25/2011    Coronary atherosclerosis of native coronary artery     Elevated PSA     Hyperlipidemia     Hyperlipidemia     Cholesterol management per Sebastian Squires M.D. Hypertension     ICD (implantable cardiac defibrillator) 5/25/2011    Rapid atrial fibrillation (HCC)     Shortness of breath     Type II or unspecified type diabetes mellitus without mention of complication, not stated as uncontrolled          SURGICALHISTORY       Past Surgical History:   Procedure Laterality Date    APPENDECTOMY      ARM SURGERY Left 10/14/2019    EXCISION LIPOMA LEFT ARM performed by Jessica Nesbitt MD at [place] [id number]  4/29/14  [address]    2 vessel CAD.  EF 45%    CARDIAC PACEMAKER PLACEMENT      medtronic     CHOLECYSTECTOMY, LAPAROSCOPIC N/A 9/26/2016    CHOLECYSTECTOMY LAPAROSCOPIC performed by Sandeep Ewing MD at [address]  01/03/2011    2V CABG (LIMA - LAD, SVG - PLM)    DIAGNOSTIC CARDIAC CATH LAB PROCEDURE  12/25/10    left heart cath, selective coronary arteriography, left ventriculography, direct infarct stenting to the left anterior descending coronary artery, left iliac arteriography, right iliac arteriography via left common femoral artery     FINGER AMPUTATION      traumatic left index finger    HERNIA REPAIR      ventral    MIDDLE EAR SURGERY Right 1966    tumor removed, some hearing loss     PACEMAKER PLACEMENT      MEDTRONIC PACEMAKER/DEFIB    LA COLONOSCOPY W/BIOPSY SINGLE/MULTIPLE N/A 4/3/2017    Dr JOSE Herrera-diverticular disease, benign ulcerated inflammatory polyp demonstrating a granulation tissue response--1 yr recall    LA EGD TRANSORAL BIOPSY SINGLE/MULTIPLE N/A 4/3/2017    Dr Yenni Mark hiatal hernia, gastritis, chronic inflammation         CURRENT MEDICATIONS       Previous Medications    ACETAMINOPHEN (TYLENOL) 325 MG TABLET    Take 650 mg by mouth every 6 hours as needed for Pain    ASPIRIN EC 81 MG EC TABLET    Take 81 mg by mouth daily    BLOOD GLUCOSE TEST STRIPS (EXACTECH TEST) STRIP    USE 1 STRIP FOR BLOOD TEST TWICE A DAY ALTERNATING TIMES EACH DAY *STABLE INSULIN THERAPY*     DEC 30,2019    BUDESONIDE-FORMOTEROL (SYMBICORT) 160-4.5 MCG/ACT AERO    Inhale 2 puffs into the lungs 2 times daily as needed     BUMETANIDE (BUMEX) 2 MG TABLET    Take 2 mg by mouth three times a week Indications: Saturday and Tuesday Monday, Wednesday and Friday. CETIRIZINE (ZYRTEC ALLERGY) 10 MG TABLET    Zyrtec 10 mg tablet   Take 1 tablet every day by oral route. ESCITALOPRAM (LEXAPRO) 10 MG TABLET    Take 10 mg by mouth daily    FUROSEMIDE (LASIX) 40 MG TABLET    Take ONE (1) tablet by mouth daily    LISINOPRIL (PRINIVIL;ZESTRIL) 5 MG TABLET    Take 1 tablet by mouth 2 times daily    METFORMIN (GLUCOPHAGE) 500 MG TABLET    Take 500 mg by mouth 2 times daily (with meals)     METOLAZONE (ZAROXOLYN) 2.5 MG TABLET    Take ONE (1) tablet by mouth Twice A Week ON MONDAYS AND THURSDAYS ONLY    MIRTAZAPINE (REMERON SOL-TAB) 30 MG DISINTEGRATING TABLET    Take 30 mg by mouth nightly     MONTELUKAST (SINGULAIR) 10 MG TABLET    Take 10 mg by mouth nightly    MULTIPLE VITAMINS-MINERALS (THERAPEUTIC MULTIVITAMIN-MINERALS) TABLET    Take 1 tablet by mouth daily    NITROGLYCERIN (NITROSTAT) 0.4 MG SL TABLET    Place 1 tablet under the tongue every 5 minutes as needed for Chest pain    OXYGEN    Inhale 4 L into the lungs nightly     PANTOPRAZOLE (PROTONIX) 40 MG TABLET    Take 40 mg by mouth daily.     POLYETHYLENE GLYCOL (GLYCOLAX) 17 GM/SCOOP POWDER    Take 17 g by mouth daily    POTASSIUM CHLORIDE (KLOR-CON M) 20 MEQ EXTENDED RELEASE TABLET    Take 10 mEq by mouth daily     PROAIR  (90 BASE) MCG/ACT INHALER    Inhale 2 puffs into the lungs as needed for Shortness of Breath     SIMVASTATIN (ZOCOR) 80 MG TABLET    Take 40 mg by mouth nightly     SOTALOL (BETAPACE) 80 MG TABLET    Take 80 mg by mouth 2 times daily        ALLERGIES     Ambien [zolpidem], Lortab [hydrocodone-acetaminophen], and Phenergan  [promethazine hcl]    FAMILY HISTORY       Family History   Adopted: Yes   Problem Relation Age of Onset    Diabetes Other     Heart Disease Other SOCIAL HISTORY       Social History     Socioeconomic History    Marital status:    Tobacco Use    Smoking status: Never    Smokeless tobacco: Former     Types: Chew     Quit date: 2014   Vaping Use    Vaping Use: Never used   Substance and Sexual Activity    Alcohol use: No     Alcohol/week: 0.0 standard drinks    Drug use: No       SCREENINGS    [place] Coma Scale  Eye Opening: Spontaneous  Best Verbal Response: Oriented  Best Motor Response: Obeys commands  [place] Coma Scale Score: 15 @FLOW([id number])@      PHYSICAL EXAM    (up to 7 for level 4, 8 or more for level 5)     ED Triage Vitals   BP Temp Temp Source Heart Rate Resp SpO2 Height Weight   22 1642 22 1642 22 1642 22 1645 22 16422 164 -- --   80/62 97.9 °F (36.6 °C) Oral 65 16 (!) 81 %         Physical Exam  Vitals and nursing note reviewed. Constitutional:       Appearance: Normal appearance. He is well-developed. He is toxic-appearing. HENT:      Head: Normocephalic and atraumatic. Mouth/Throat:      Mouth: Mucous membranes are moist.   Eyes:      General: No scleral icterus. Right eye: No discharge. Left eye: No discharge. Cardiovascular:      Rate and Rhythm: Normal rate and regular rhythm. Heart sounds: Normal heart sounds, S1 normal and S2 normal.   Pulmonary:      Effort: Respiratory distress present. Musculoskeletal:      Cervical back: Normal range of motion and neck supple. Right lower le+ Edema present. Left lower le+ Edema present. Neurological:      Mental Status: He is alert, oriented to person, place, and time and easily aroused.    Psychiatric:         Behavior: Behavior normal.       DIAGNOSTIC RESULTS     EKG: All EKG's are interpreted by the Emergency Department Physician who either signs or Co-signsthis chart in the absence of a cardiologist.  67 sinus rhythm PVC PA intervals 58 right bundle branch block old inferior infarct read at 1740 by Dr. Sabina Melgar:   Barren Sherif such as CT, Ultrasound and MRI are read by the radiologist. Plain radiographic images are visualized and preliminarily interpreted by the emergency physician with the below findings:      Interpretation per the Radiologist below, if available at the time of this note:    XR CHEST PORTABLE   Final Result   Aerated lung volumes are normal.   Borderline cardiomegaly. Subtle alveolar haziness noted in the right paracardiac and lateral lower left lung. Findings may suggest early or borderline moderate edema. Consider a viral respiratory illness in the appropriate clinical setting. No sizable pleural effusion is apparent. Status post median sternotomy. Left-sided cardiac pacemaker/AICD device. Recommendation:    Follow up as clinically indicated.    Electronically Signed by Priyank Wagoner MD at 11-Aug-2022 07:25:11 PM                     ED BEDSIDEULTRASOUND:   Performed by ED Physician -none    LABS:  Labs Reviewed   CBC WITH AUTO DIFFERENTIAL - Abnormal; Notable for the following components:       Result Value    RBC 6.31 (*)     Hematocrit 56.4 (*)     MCHC 31.7 (*)     RDW 16.3 (*)     Neutrophils % 80.3 (*)     Lymphocytes % 11.6 (*)     Neutrophils Absolute 7.6 (*)     All other components within normal limits   BRAIN NATRIURETIC PEPTIDE - Abnormal; Notable for the following components:    Pro-BNP 28,166 (*)     All other components within normal limits   TROPONIN - Abnormal; Notable for the following components:    Troponin 0.06 (*)     All other components within normal limits   LACTIC ACID - Abnormal; Notable for the following components:    Lactic Acid 3.0 (*)     All other components within normal limits    Narrative:     CALL  [employer]  [employer] tel. ,  Chemistry results called to and read back by JIMENA Pat, 08/11/2022 17:49,  by VARINDER   BLOOD GAS, ARTERIAL - Abnormal; Notable for the following components:    pH, Arterial 7.490 (*) pCO2, Arterial 27.0 (*)     pO2, Arterial 49.0 (*)     HCO3, Arterial 20.6 (*)     O2 Sat, Arterial 87.2 (*)     All other components within normal limits    Narrative:     CALL  [employer] tel[employer] ,  hoa mcnally np, 08/11/2022 17:40, by [facility] [place]   COMPREHENSIVE METABOLIC PANEL W/ REFLEX TO MG FOR LOW K - Abnormal; Notable for the following components:    Glucose 387 (*)     BUN 6 (*)     Total Protein 6.5 (*)     Alkaline Phosphatase 159 (*)     All other components within normal limits   TROPONIN - Abnormal; Notable for the following components:    Troponin 0.14 (*)     All other components within normal limits    Narrative:     CALL  [employer] tel. ,  Chemistry results called to and read back by Nico Dacosta, 08/11/2022 20:00,  by Wisam Livingston - Abnormal; Notable for the following components:    Magnesium 2.5 (*)     All other components within normal limits   PHOSPHORUS - Abnormal; Notable for the following components:    Phosphorus 5.4 (*)     All other components within normal limits   LIPID PANEL - Abnormal; Notable for the following components:    Triglycerides 253 (*)     HDL 40 (*)     All other components within normal limits    Narrative:     CALL  [employer] tel[employer] ,  Chemistry results called to and read back by Nico Dacosta, 08/11/2022 20:00,  by Zelphia Nissen A1C - Abnormal; Notable for the following components:    Hemoglobin A1C 8.0 (*)     All other components within normal limits   URINALYSIS WITH REFLEX TO CULTURE - Abnormal; Notable for the following components:    Color, UA DARK YELLOW (*)     Bilirubin Urine SMALL (*)     Ketones, Urine TRACE (*)     Protein,  (*)     All other components within normal limits   MICROSCOPIC URINALYSIS - Abnormal; Notable for the following components:    Bacteria, UA NEGATIVE (*)     Crystals, UA NEG (*)     RBC, UA 6 (*)     All other components within normal limits   COVID-19, RAPID   RESPIRATORY PANEL, MOLECULAR, WITH COVID-19   STREP PNEUMONIAE ANTIGEN    Narrative:     ORDER#: [id number]                          ORDERED BY: Nate Read  SOURCE: Urine Clean Catch                  COLLECTED:  08/11/22 20:32  ANTIBIOTICS AT [facility].:                      RECEIVED :  08/11/22 20:35   LEGIONELLA ANTIGEN, URINE    Narrative:     ORDER#: [id number]                          ORDERED BY: Nate Read  SOURCE: [id number]                              COLLECTED:  08/11/22 20:32  ANTIBIOTICS AT [facility]ERTO.:                      RECEIVED :  08/11/22 20:35   CULTURE, BLOOD 1   CULTURE, BLOOD 2   SPECIMEN REJECTION   PROCALCITONIN    Narrative:     CALL  [employer]  [facility] ,  Chemistry results called to and read back by Te Hopkins, 08/11/2022 20:00,  by Fara Coburn   TSH WITH REFLEX TO FT4   LACTIC ACID       All other labs were within normal range or not returned as of this dictation. EMERGENCY DEPARTMENT COURSE and DIFFERENTIALDIAGNOSIS/MDM:   Vitals:    Vitals:    08/11/22 1915 08/11/22 2020 08/11/22 2025 08/11/22 2035   BP: (!) 71/58 91/69 108/88 113/77   Pulse: 65 69 86 99   Resp: 15 20 19 21   Temp:       TempSrc:       SpO2: 91% 97% 97% 97%   Weight:               MDM  To Dr. Reshma Myles who accepted patient for admission to ICU. Radha Rodas Started on dopamine and his pressure has come up. Pt is tolerating cpap without difficulty      CONSULTS:  None    PROCEDURES:  Unless otherwise noted below, none     Procedures    FINAL IMPRESSION      1. Acute on chronic congestive heart failure, unspecified heart failure type ([address]    2. Severe pulmonary hypertension ([address]    3. Respiratory failure, unsp, unsp w hypoxia or hypercapnia (HCC)    4. Elevated troponin        DISPOSITION/PLAN   DISPOSITION Admitted 08/11/2022 07:02:41 PM      PATIENT REFERRED TO:  No follow-up provider specified.     DISCHARGE MEDICATIONS:  New Prescriptions    No medications on file          (Please note that portions of this note were completed with a voice recognitionprogram.  Efforts were made to edit the dictations but occasionally words are mis-transcribed.)    AMPARO Figueroa (electronically signed)          AMPARO Figueroa  08/11/22 [id number]

## 2022-08-12 ENCOUNTER — APPOINTMENT (OUTPATIENT)
Dept: GENERAL RADIOLOGY | Age: 77
DRG: 871 | End: 2022-08-12
Payer: MEDICARE

## 2022-08-12 VITALS
WEIGHT: 184 LBS | HEIGHT: 66 IN | BODY MASS INDEX: 29.57 KG/M2 | TEMPERATURE: 97.5 F | HEART RATE: 116 BPM | RESPIRATION RATE: 30 BRPM | DIASTOLIC BLOOD PRESSURE: 61 MMHG | SYSTOLIC BLOOD PRESSURE: 81 MMHG | OXYGEN SATURATION: 78 %

## 2022-08-12 LAB
ALBUMIN SERPL-MCNC: 3.3 G/DL (ref 3.5–5.2)
ALLENS TEST: ABNORMAL
ALP BLD-CCNC: 394 U/L (ref 40–130)
ALT SERPL-CCNC: 66 U/L (ref 5–41)
ANION GAP SERPL CALCULATED.3IONS-SCNC: 17 MMOL/L (ref 7–19)
APTT: >200 SEC (ref 26–36.2)
APTT: >200 SEC (ref 26–36.2)
AST SERPL-CCNC: 103 U/L (ref 5–40)
BASE EXCESS ARTERIAL: -6.3 MMOL/L (ref -2–2)
BASOPHILS ABSOLUTE: 0 K/UL (ref 0–0.2)
BASOPHILS RELATIVE PERCENT: 0.4 % (ref 0–1)
BI-LEVEL POS AIRWAY PRESSURE(EXPIRATORY): 6
BI-LEVEL POS AIRWAY PRESSURE(INSPIRATORY): 12
BILIRUB SERPL-MCNC: 1.5 MG/DL (ref 0.2–1.2)
BUN BLDV-MCNC: 101 MG/DL (ref 8–23)
CALCIUM SERPL-MCNC: 10.1 MG/DL (ref 8.8–10.2)
CARBOXYHEMOGLOBIN ARTERIAL: 1.2 % (ref 0–5)
CHLORIDE BLD-SCNC: 93 MMOL/L (ref 98–111)
CO2: 24 MMOL/L (ref 22–29)
CREAT SERPL-MCNC: 3.9 MG/DL (ref 0.5–1.2)
EOSINOPHILS ABSOLUTE: 0 K/UL (ref 0–0.6)
EOSINOPHILS RELATIVE PERCENT: 0.2 % (ref 0–5)
GFR AFRICAN AMERICAN: 18
GFR NON-AFRICAN AMERICAN: 15
GLUCOSE BLD-MCNC: 173 MG/DL (ref 74–109)
HCO3 ARTERIAL: 14.2 MMOL/L (ref 22–26)
HCT VFR BLD CALC: 59.9 % (ref 42–52)
HEMOGLOBIN, ART, EXTENDED: 18.7 G/DL (ref 14–18)
HEMOGLOBIN: 19 G/DL (ref 14–18)
IMMATURE GRANULOCYTES #: 0.1 K/UL
LACTIC ACID: 2.5 MMOL/L (ref 0.5–1.9)
LYMPHOCYTES ABSOLUTE: 1.4 K/UL (ref 1.1–4.5)
LYMPHOCYTES RELATIVE PERCENT: 13.5 % (ref 20–40)
MCH RBC QN AUTO: 28.3 PG (ref 27–31)
MCHC RBC AUTO-ENTMCNC: 31.7 G/DL (ref 33–37)
MCV RBC AUTO: 89.3 FL (ref 80–94)
METHEMOGLOBIN ARTERIAL: 1.4 %
MODE: ABNORMAL
MONOCYTES ABSOLUTE: 1 K/UL (ref 0–0.9)
MONOCYTES RELATIVE PERCENT: 9.2 % (ref 0–10)
NEUTROPHILS ABSOLUTE: 7.9 K/UL (ref 1.5–7.5)
NEUTROPHILS RELATIVE PERCENT: 76 % (ref 50–65)
O2 CONTENT ARTERIAL: 22.4 ML/DL
O2 SAT, ARTERIAL: 85.7 %
O2 THERAPY: ABNORMAL
OXYGEN FLOW: 45
PCO2 ARTERIAL: 20 MMHG (ref 35–45)
PDW BLD-RTO: 16.9 % (ref 11.5–14.5)
PH ARTERIAL: 7.46 (ref 7.35–7.45)
PLATELET # BLD: 171 K/UL (ref 130–400)
PMV BLD AUTO: 11.2 FL (ref 9.4–12.4)
PO2 ARTERIAL: 49 MMHG (ref 80–100)
POTASSIUM REFLEX MAGNESIUM: 4.7 MMOL/L (ref 3.5–5)
POTASSIUM, WHOLE BLOOD: 4.7
RBC # BLD: 6.71 M/UL (ref 4.7–6.1)
SODIUM BLD-SCNC: 134 MMOL/L (ref 136–145)
TOTAL PROTEIN: 6.8 G/DL (ref 6.6–8.7)
TROPONIN: 0.17 NG/ML (ref 0–0.03)
TROPONIN: 0.24 NG/ML (ref 0–0.03)
TROPONIN: 0.25 NG/ML (ref 0–0.03)
WBC # BLD: 10.4 K/UL (ref 4.8–10.8)

## 2022-08-12 PROCEDURE — 6360000002 HC RX W HCPCS: Performed by: HOSPITALIST

## 2022-08-12 PROCEDURE — 85730 THROMBOPLASTIN TIME PARTIAL: CPT

## 2022-08-12 PROCEDURE — 36415 COLL VENOUS BLD VENIPUNCTURE: CPT

## 2022-08-12 PROCEDURE — 83605 ASSAY OF LACTIC ACID: CPT

## 2022-08-12 PROCEDURE — 80053 COMPREHEN METABOLIC PANEL: CPT

## 2022-08-12 PROCEDURE — 94640 AIRWAY INHALATION TREATMENT: CPT

## 2022-08-12 PROCEDURE — 85025 COMPLETE CBC W/AUTO DIFF WBC: CPT

## 2022-08-12 PROCEDURE — 2580000003 HC RX 258: Performed by: HOSPITALIST

## 2022-08-12 PROCEDURE — 71045 X-RAY EXAM CHEST 1 VIEW: CPT | Performed by: RADIOLOGY

## 2022-08-12 PROCEDURE — 6370000000 HC RX 637 (ALT 250 FOR IP): Performed by: HOSPITALIST

## 2022-08-12 PROCEDURE — 84484 ASSAY OF TROPONIN QUANT: CPT

## 2022-08-12 PROCEDURE — 02HV33Z INSERTION OF INFUSION DEVICE INTO SUPERIOR VENA CAVA, PERCUTANEOUS APPROACH: ICD-10-PCS | Performed by: HOSPITALIST

## 2022-08-12 PROCEDURE — 2500000003 HC RX 250 WO HCPCS: Performed by: HOSPITALIST

## 2022-08-12 PROCEDURE — 71045 X-RAY EXAM CHEST 1 VIEW: CPT

## 2022-08-12 PROCEDURE — 36600 WITHDRAWAL OF ARTERIAL BLOOD: CPT

## 2022-08-12 PROCEDURE — 94660 CPAP INITIATION&MGMT: CPT

## 2022-08-12 PROCEDURE — 2700000000 HC OXYGEN THERAPY PER DAY

## 2022-08-12 PROCEDURE — 82803 BLOOD GASES ANY COMBINATION: CPT

## 2022-08-12 RX ORDER — INSULIN LISPRO 100 [IU]/ML
0-4 INJECTION, SOLUTION INTRAVENOUS; SUBCUTANEOUS NIGHTLY
Status: DISCONTINUED | OUTPATIENT
Start: 2022-08-12 | End: 2022-08-12 | Stop reason: HOSPADM

## 2022-08-12 RX ORDER — HEPARIN SODIUM 1000 [USP'U]/ML
4000 INJECTION, SOLUTION INTRAVENOUS; SUBCUTANEOUS ONCE
Status: COMPLETED | OUTPATIENT
Start: 2022-08-12 | End: 2022-08-12

## 2022-08-12 RX ORDER — INSULIN LISPRO 100 [IU]/ML
0-4 INJECTION, SOLUTION INTRAVENOUS; SUBCUTANEOUS
Status: DISCONTINUED | OUTPATIENT
Start: 2022-08-12 | End: 2022-08-12 | Stop reason: HOSPADM

## 2022-08-12 RX ORDER — HEPARIN SODIUM 1000 [USP'U]/ML
4000 INJECTION, SOLUTION INTRAVENOUS; SUBCUTANEOUS PRN
Status: DISCONTINUED | OUTPATIENT
Start: 2022-08-12 | End: 2022-08-12 | Stop reason: HOSPADM

## 2022-08-12 RX ORDER — NOREPINEPHRINE BIT/0.9 % NACL 16MG/250ML
1-100 INFUSION BOTTLE (ML) INTRAVENOUS CONTINUOUS
Status: DISCONTINUED | OUTPATIENT
Start: 2022-08-12 | End: 2022-08-12 | Stop reason: HOSPADM

## 2022-08-12 RX ORDER — HEPARIN SODIUM 10000 [USP'U]/100ML
5-30 INJECTION, SOLUTION INTRAVENOUS CONTINUOUS
Status: DISCONTINUED | OUTPATIENT
Start: 2022-08-12 | End: 2022-08-12 | Stop reason: HOSPADM

## 2022-08-12 RX ORDER — HEPARIN SODIUM 1000 [USP'U]/ML
2000 INJECTION, SOLUTION INTRAVENOUS; SUBCUTANEOUS PRN
Status: DISCONTINUED | OUTPATIENT
Start: 2022-08-12 | End: 2022-08-12 | Stop reason: HOSPADM

## 2022-08-12 RX ORDER — MIDAZOLAM IN NACL,ISO-OSMOT/PF 50 MG/50ML
1-10 INFUSION BOTTLE (ML) INTRAVENOUS CONTINUOUS
Status: CANCELLED | OUTPATIENT
Start: 2022-08-12

## 2022-08-12 RX ADMIN — SODIUM CHLORIDE, PRESERVATIVE FREE 10 ML: 5 INJECTION INTRAVENOUS at 00:10

## 2022-08-12 RX ADMIN — ESCITALOPRAM OXALATE 10 MG: 10 TABLET ORAL at 09:36

## 2022-08-12 RX ADMIN — METRONIDAZOLE 500 MG: 500 INJECTION, SOLUTION INTRAVENOUS at 00:37

## 2022-08-12 RX ADMIN — HEPARIN SODIUM AND DEXTROSE 12 UNITS/KG/HR: 10000; 5 INJECTION INTRAVENOUS at 06:46

## 2022-08-12 RX ADMIN — AZITHROMYCIN MONOHYDRATE 500 MG: 500 INJECTION, POWDER, LYOPHILIZED, FOR SOLUTION INTRAVENOUS at 01:45

## 2022-08-12 RX ADMIN — Medication 10 MCG/MIN: at 03:02

## 2022-08-12 RX ADMIN — FUROSEMIDE 5 MG/HR: 10 INJECTION, SOLUTION INTRAVENOUS at 05:04

## 2022-08-12 RX ADMIN — POTASSIUM CHLORIDE 10 MEQ: 750 TABLET, EXTENDED RELEASE ORAL at 09:36

## 2022-08-12 RX ADMIN — ASPIRIN 81 MG: 81 TABLET, COATED ORAL at 09:36

## 2022-08-12 RX ADMIN — PANTOPRAZOLE SODIUM 40 MG: 40 TABLET, DELAYED RELEASE ORAL at 09:35

## 2022-08-12 RX ADMIN — POLYETHYLENE GLYCOL 3350 17 G: 17 POWDER, FOR SOLUTION ORAL at 09:35

## 2022-08-12 RX ADMIN — SODIUM CHLORIDE, PRESERVATIVE FREE 10 ML: 5 INJECTION INTRAVENOUS at 09:36

## 2022-08-12 RX ADMIN — MULTIPLE VITAMINS W/ MINERALS TAB 1 TABLET: TAB at 09:35

## 2022-08-12 RX ADMIN — ARFORMOTEROL TARTRATE 15 MCG: 15 SOLUTION RESPIRATORY (INHALATION) at 06:22

## 2022-08-12 RX ADMIN — METRONIDAZOLE 500 MG: 500 INJECTION, SOLUTION INTRAVENOUS at 05:52

## 2022-08-12 RX ADMIN — Medication 80 MCG/MIN: at 07:39

## 2022-08-12 RX ADMIN — Medication 100 MCG/MIN: at 10:42

## 2022-08-12 RX ADMIN — BUDESONIDE 500 MCG: 0.5 SUSPENSION RESPIRATORY (INHALATION) at 06:24

## 2022-08-12 RX ADMIN — HEPARIN SODIUM 4000 UNITS: 1000 INJECTION INTRAVENOUS; SUBCUTANEOUS at 06:29

## 2022-08-12 ASSESSMENT — ENCOUNTER SYMPTOMS
ALLERGIC/IMMUNOLOGIC NEGATIVE: 1
GASTROINTESTINAL NEGATIVE: 1
NAUSEA: 0
SHORTNESS OF BREATH: 1
EYES NEGATIVE: 1

## 2022-08-12 NOTE — PLAN OF CARE
Problem: Discharge Planning  Goal: Discharge to home or other facility with appropriate resources  Outcome: Progressing  Flowsheets (Taken 8/11/2022 2227)  Discharge to home or other facility with appropriate resources:   Identify barriers to discharge with patient and caregiver   Identify discharge learning needs (meds, wound care, etc)   Refer to discharge planning if patient needs post-hospital services based on physician order or complex needs related to functional status, cognitive ability or social support system     Problem: Safety - Adult  Goal: Free from fall injury  Outcome: Progressing     Problem: ABCDS Injury Assessment  Goal: Absence of physical injury  Outcome: Progressing     Problem: Chronic Conditions and Co-morbidities  Goal: Patient's chronic conditions and co-morbidity symptoms are monitored and maintained or improved  Outcome: Progressing     Problem: Skin/Tissue Integrity  Goal: Absence of new skin breakdown  Description: 1. Monitor for areas of redness and/or skin breakdown  2. Assess vascular access sites hourly  3. Every 4-6 hours minimum:  Change oxygen saturation probe site  4. Every 4-6 hours:  If on nasal continuous positive airway pressure, respiratory therapy assess nares and determine need for appliance change or resting period. Outcome: Progressing     Problem: Skin/Tissue Integrity  Goal: Absence of new skin breakdown  Description: 1. Monitor for areas of redness and/or skin breakdown  2. Assess vascular access sites hourly  3. Every 4-6 hours minimum:  Change oxygen saturation probe site  4. Every 4-6 hours:  If on nasal continuous positive airway pressure, respiratory therapy assess nares and determine need for appliance change or resting period.   Outcome: Progressing

## 2022-08-12 NOTE — PROGRESS NOTES
Hospitalist Progress Note  [facility]     Patient: Jc Hinson  : 1945  MRN: [id number]  Code Status: DNR    Hospital Day: 1   Date of Service: 2022    Subjective:   Patient seen and examined. BiPAP /8 with 15 L. Past Medical History:   Diagnosis Date    Acute myocardial infarction of anterolateral wall, subsequent episode of care ([address] 2011    AICD discharge     Allergies     Arthritis     CAD (coronary artery disease) of artery bypass graft     Cardiomyopathy, ischemic 2011    Coronary atherosclerosis of native coronary artery     Elevated PSA     Hyperlipidemia     Hyperlipidemia     Cholesterol management per Siria Morel M.D. Hypertension     ICD (implantable cardiac defibrillator) 2011    Rapid atrial fibrillation (HCC)     Shortness of breath     Type II or unspecified type diabetes mellitus without mention of complication, not stated as uncontrolled        Past Surgical History:   Procedure Laterality Date    APPENDECTOMY      ARM SURGERY Left 10/14/2019    EXCISION LIPOMA LEFT ARM performed by Adrián Schmidt MD at [place] [address]  14  [facility]    2 vessel CAD.  EF 45%    CARDIAC PACEMAKER PLACEMENT      medtronic     CHOLECYSTECTOMY, LAPAROSCOPIC N/A 2016    CHOLECYSTECTOMY LAPAROSCOPIC performed by Juarez Kevin MD at [address] [place] [address]  2011    2V CABG (LIMA - LAD, SVG - PLM)    DIAGNOSTIC CARDIAC CATH LAB PROCEDURE  12/25/10    left heart cath, selective coronary arteriography, left ventriculography, direct infarct stenting to the left anterior descending coronary artery, left iliac arteriography, right iliac arteriography via left common femoral artery     FINGER AMPUTATION      traumatic left index finger    HERNIA REPAIR      ventral    MIDDLE EAR SURGERY Right 1966    tumor removed, some hearing loss     PACEMAKER PLACEMENT      MEDTRONIC PACEMAKER/DEFIB    ID COLONOSCOPY W/BIOPSY SINGLE/MULTIPLE N/A 4/3/2017    Dr JOSE Herrera-diverticular disease, benign ulcerated inflammatory polyp demonstrating a granulation tissue response--1 yr recall    IA EGD TRANSORAL BIOPSY SINGLE/MULTIPLE N/A 4/3/2017    Dr Bartley Moritz hiatal hernia, gastritis, chronic inflammation       Family History   Adopted: Yes   Problem Relation Age of Onset    Diabetes Other     Heart Disease Other        Social History     Socioeconomic History    Marital status:      Spouse name: Not on file    Number of children: Not on file    Years of education: Not on file    Highest education level: Not on file   Occupational History    Occupation: retired   Tobacco Use    Smoking status: Never    Smokeless tobacco: Former     Types: Chew     Quit date: 4/28/2014   Vaping Use    Vaping Use: Never used   Substance and Sexual Activity    Alcohol use: No     Alcohol/week: 0.0 standard drinks    Drug use: No    Sexual activity: Not on file   Other Topics Concern    Not on file   Social History Narrative    Not on file     Social Determinants of Health     Financial Resource Strain: Not on file   Food Insecurity: Not on file   Transportation Needs: Not on file   Physical Activity: Not on file   Stress: Not on file   Social Connections: Not on file   Intimate Partner Violence: Not on file   Housing Stability: Not on file       Current Facility-Administered Medications   Medication Dose Route Frequency Provider Last Rate Last Admin    norepinephrine (LEVOPHED) 16 mg in sodium chloride 0.9 % 250 mL infusion  1-100 mcg/min IntraVENous Continuous Chani Rosas MD 93.8 mL/hr at 08/12/22 1042 100 mcg/min at 08/12/22 1042    heparin (porcine) injection 4,000 Units  4,000 Units IntraVENous PRN Chani Rosas MD        heparin (porcine) injection 2,000 Units  2,000 Units IntraVENous PRN Chani Rosas MD        heparin 25,000 units in dextrose 5% 250 mL (premix) infusion  5-30 Units/kg/hr IntraVENous Continuous Chain Rosas MD 6.7 mL/hr at 08/12/22 1117 8 Units/kg/hr at 08/12/22 1117    phenylephrine (YURY-SYNEPHRINE) 50 mg in dextrose 5 % 250 mL infusion   mcg/min IntraVENous Continuous Dariel Woo MD        insulin lispro (HUMALOG) injection vial 0-4 Units  0-4 Units SubCUTAneous TID WC Dariel Woo MD        insulin lispro (HUMALOG) injection vial 0-4 Units  0-4 Units SubCUTAneous Nightly Dariel Woo MD        aspirin EC tablet 81 mg  81 mg Oral Daily Victorine Angelucci, MD   81 mg at 08/12/22 7760    therapeutic multivitamin-minerals 1 tablet  1 tablet Oral Daily Victorine Angelucci, MD   1 tablet at 08/12/22 0935    nitroGLYCERIN (NITROSTAT) SL tablet 0.4 mg  0.4 mg SubLINGual Q5 Min PRN Victorine Angelucci, MD        montelukast (SINGULAIR) tablet 10 mg  10 mg Oral Nightly Victorine Angelucci, MD        albuterol (PROVENTIL) nebulizer solution 2.5 mg  2.5 mg Nebulization Q4H PRN Victorine Angelucci, MD        budesonide (PULMICORT) nebulizer suspension 500 mcg  0.5 mg Nebulization Q12H Victorine Angelucci, MD   500 mcg at 08/12/22 5232    Arformoterol Tartrate (BROVANA) nebulizer solution 15 mcg  15 mcg Nebulization BID Victorine Angelucci, MD   15 mcg at 08/12/22 0622    glucose chewable tablet 16 g  4 tablet Oral PRN Victorine Angelucci, MD        dextrose bolus 10% 125 mL  125 mL IntraVENous PRN Victorine Angelucci, MD        Or    dextrose bolus 10% 250 mL  250 mL IntraVENous PRN Victorine Angelucci, MD        glucagon (rDNA) injection 1 mg  1 mg SubCUTAneous PRN Victorine Angelucci, MD        dextrose 10 % infusion   IntraVENous Continuous PRN Victorine Angelucci, MD        ondansetron (ZOFRAN-ODT) disintegrating tablet 4 mg  4 mg Oral Q8H PRN Victorine Angelucci, MD        Or    ondansetron [facility]) injection 4 mg  4 mg IntraVENous Q6H PRN Victorine Angelucci, MD        acetaminophen (TYLENOL) tablet 650 mg  650 mg Oral Q6H PRN Victorine Angelucci, MD        Or    acetaminophen (TYLENOL) suppository 650 mg  650 mg Rectal Q6H PRN Victorine Angelucci, MD        sodium chloride flush 0.9 % injection 5-40 mL  5-40 mL IntraVENous 2 times per day Latonya Johnson MD   10 mL at 08/12/22 0936    sodium chloride flush 0.9 % injection 5-40 mL  5-40 mL IntraVENous PRN Latonya Johnson MD        0.9 % sodium chloride infusion   IntraVENous EDINN Latonya Johnson MD        potassium chloride 20 mEq/50 mL IVPB (Central Line)  20 mEq IntraVENous PRN Latonya Johnson MD        Or    potassium chloride 10 mEq/100 mL IVPB (Peripheral Line)  10 mEq IntraVENous PRN Latonya Johnson MD        magnesium sulfate 2000 mg in 50 mL IVPB premix  2,000 mg IntraVENous PRN Latonya Johnson MD        sodium phosphate 10 mmol in sodium chloride 0.9 % 250 mL IVPB  10 mmol IntraVENous PRN Latonya Johnson MD        Or    sodium phosphate 15 mmol in sodium chloride 0.9 % 250 mL IVPB  15 mmol IntraVENous PRN Latonya Johnson MD        Or    sodium phosphate 20 mmol in sodium chloride 0.9 % 500 mL IVPB  20 mmol IntraVENous PRN Latonya Johnson MD        polyethylene glycol (GLYCOLAX) packet 17 g  17 g Oral Daily PRN Latonya Johnson MD        melatonin disintegrating tablet 5 mg  5 mg Oral Nightly PRN Latonya Johnson MD        calcium carbonate (TUMS) chewable tablet 500 mg  500 mg Oral TID PRN Latonya Johnson MD        polyethylene glycol (GLYCOLAX) packet 17 g  17 g Oral Daily Latonya Johnson MD   17 g at 08/12/22 0935    mirtazapine (REMERON SOL-TAB) disintegrating tablet 30 mg  30 mg Oral Nightly Latonya Johnson MD        escitalopram (LEXAPRO) tablet 10 mg  10 mg Oral Daily Latonya Johnson MD   10 mg at 08/12/22 0936    atorvastatin (LIPITOR) tablet 40 mg  40 mg Oral Nightly Latonya Johnson MD        potassium chloride (KLOR-CON M) extended release tablet 10 mEq  10 mEq Oral Daily Latonya Johnson MD   10 mEq at 08/12/22 0936    pantoprazole (PROTONIX) tablet 40 mg  40 mg Oral Daily Latonya Johnson MD   40 mg at 08/12/22 0935    cefTRIAXone (ROCEPHIN) 2000 mg in 50 mL IVPB (premix)  2,000 mg IntraVENous Q24H Latonya Johnson MD   Stopped at 08/12/22 0038    metronidazole (FLAGYL) 500 mg in 0.9% NaCl 100 mL IVPB premix  500 mg IntraVENous Q8H Dino Estes MD   Stopped at 08/12/22 0654    azithromycin (ZITHROMAX) 250 mg in dextrose 5 % 250 mL IVPB  250 mg IntraVENous Q24H Dino Estes MD             norepinephrine 100 mcg/min (08/12/22 1042)    heparin (PORCINE) Infusion 8 Units/kg/hr (08/12/22 1117)    phenylephrine (YURY-SYNEPHRINE) 50mg/250mL infusion      dextrose      sodium chloride          Objective:   BP (!) 102/54   Pulse (!) 119   Temp 97.6 °F (36.4 °C) (Temporal)   Resp 12   Ht 5' 6\" (1.676 m)   Wt 184 lb (83.5 kg)   SpO2 100%   BMI 29.70 kg/m²     HEENT: normocephalic  Neck: supple, symmetrical, trachea midline   Lungs: crackles  Cardiovascular: s1 and s2 normal  Abdomen: soft, positive bowel sounds  Extremities: no cyanosis   Neuro: alert, no acute focal motor deficits     Recent Labs     08/11/22  1710 08/12/22  0520   WBC 9.5 10.4   RBC 6.31* 6.71*   HGB 17.9 19.0*   HCT 56.4* 59.9*   MCV 89.4 89.3   MCH 28.4 28.3   MCHC 31.7* 31.7*    171     Recent Labs     08/11/22  1736 08/12/22  0133 08/12/22  0315   NA  --  134*  --    K 4.3 4.7 4.7   ANIONGAP  --  17  --    CL  --  93*  --    CO2  --  24  --    BUN  --  101*  --    CREATININE  --  3.9*  --    GLUCOSE  --  173*  --    CALCIUM  --  10.1  --      Recent Labs     08/11/22  1857   MG 2.5*   PHOS 5.4*     Recent Labs     08/12/22  0133   *   ALT 66*   BILITOT 1.5*   ALKPHOS 394*     No results for input(s): PH, PO2, PCO2, HCO3, BE, O2SAT in the last 72 hours. Recent Labs     08/12/22  0133 08/12/22  0429 08/12/22  0520   TROPONINI 0.17* 0.24* 0.25*     No results for input(s): INR in the last 72 hours.   Recent Labs     08/12/22  0133   LACTA 2.5*         Intake/Output Summary (Last 24 hours) at 8/12/2022 1212  Last data filed at 8/12/2022 1011  Gross per 24 hour   Intake 1656.02 ml   Output 710 ml   Net 946.02 ml       XR CHEST PORTABLE    Result Date: 8/12/2022  NO PRIOR REPORT AVAILABLE Exam: X-RAY OF [facility] Clinical data:Central line placement verification. Technique:Single view of the chest. Prior studies: Radiograph of the chest dated 08/11/2022. Findings: Right central venous line is seen with its tip at the expected cavo-atrial junction. Stable patchy alveolar opacities in left paracardiac region. Rest of the lungs are grossly clear; noevidence of pleural effusion. Borderline cardiomegaly. Median sternotomy wires noted. Cardiac pacemaker/ AICD noted. No acute osseous abnormality is detected. Monitoring leads are seen overlyning the chest.    1. Adequate positioning of right central venous line. 2. Stable patchy alveolar opacities in left paracardiac region. Recommendation: Follow up as clinically indicated. Electronically Signed by Chrystie Bence MD at 12-Aug-2022 06:51:03 AM             XR CHEST PORTABLE    Result Date: 8/11/2022  NO PRIOR REPORT AVAILABLE Exam: X-RAY OF [facility] Clinical data:Shortness of breath. Technique:Single view of the chest. Prior studies: No prior studies submitted. Findings: The trachea is midline. The aorta is normal in caliber. Sequelae of median sternotomy are evident. A left-sided single lead cardiac pacemaker/AICD control unit is noted. The heart is top normal in size and normal in configuration. The lungs  are well expanded and free of focal consolidation. There is minor pleural scarring at the lateral right apex. There is subtle haziness at the left base and in the right paracardiac region. .  The pulmonary vlad and mediastinum are unremarkable. No sizable pleural effusion nor pneumothorax. The soft tissues and bony structures demonstrate no acute pathology. Monitoring leads overlie the chest.    Aerated lung volumes are normal. Borderline cardiomegaly. Subtle alveolar haziness noted in the right paracardiac and lateral lower left lung. Findings may suggest early or borderline moderate edema.   Consider a viral respiratory illness in the appropriate clinical setting. No sizable pleural effusion is apparent. Status post median sternotomy. Left-sided cardiac pacemaker/AICD device. Recommendation: Follow up as clinically indicated. Electronically Signed by Colette Steve MD at 11-Aug-2022 07:25:11 PM              Assessment and Plan:   Acute on chronic decompensated combined systolic and diastolic CHF  Patient of Dr. Tamra José  TTE 2022: EF 19%, grade 1 diastolic dysfunction  History of ischemic cardiomyopathy  S/p ICD  History of CAD s/p CABG  History of severe pulmonary hypertension  Cardiology following  Meds per cardiology  Strict I's and O's  Daily weights    Undifferentiated shock  Hypovolemic versus cardiogenic versus other etiologies  Continue treatment plan as listed  Requiring vasopressor support  Follow cultures  Empiric antibiotics as warranted  Trend lactate to clearance    JOSEF/CKD 4  Patient of Dr. Ottis Apgar  Renal consulted  Follow renal function/urine output/electrolytes  Avoid offending agents  Caution cardiorenal syndrome    Elevated troponin  Cardiology following  Serial troponin 0.06 > 0.14 > 0.17 > 0.24 > 0.25  Heparin gtt    Acute on chronic hypoxemic respiratory failure  Suspect secondary to above processes  Requires 4.5 L supplemental O2 at baseline  Currently requiring BiPAP / with 15 L  Follow blood gas    Paroxysmal atrial fibrillation  Rate control  Heparin gtt  TSH 2.52  Follow electrolytes    DM2  Meds on board    DVT prophylaxis  Heparin    Extensive discussion with patient's wife and daughter in regards to current clinical state/goals of care. All questions sought and answered. They have requested DNR code status. Further decisions pending clinical course. Palliative care consulted in this regard. Sravanthi Sharma RN witnessed the entire discussion.     Total critical care time: 65 minutes    Martina Torrez MD   2022 12:12 PM

## 2022-08-12 NOTE — PROCEDURES
Consulted for or Contacted for need for a CVC  US Guided Vascular Access    Patient informed consent obtained prior to procedure in all non life-threatening or limb-threatening emergencies. Patient was examined preprocedurally with US to determine the best site for CVC insertion. The patient was positioned. Sterile precautions were taken including but not necessarily limited to: cap, mask, sterile gown, gloves, probe-cover, and draping. The patient received 1.5cc of the local anesthetic with the lidocaine included in the CVC procedural tray as the patient was not under sedation and/or already being treated with systemic pain medications. The target vessel was entered under direct US guidance with aspiration of dark venous blood, there was a slow dribble from the finder needle after the syringe was detatched from the needle. The guidewire was inserted through the finder needle. The hollow bore needle was removed while the guidewire was held in place. The guidewire was then traced again with the US from where it entered the skin down to the level of the clavicle with the US to ensure placement of the CVC in to the correct target vessel prior to dilation. The scalpel was used to make a small nick along the side of the guidewire so as to allow passage of the dilator over the guidewire. The dilator was then passed over the guidewire, folllowed by placement of the CVC over the guidewire. The CVC was then held in place while the guidwire was removed. Port was attached to the middle lumen of the CVC. All ports were then aspirated with return of blood. They were then all then flushed easily. The biopatch was then applied. The CVC was then sutured on to place. The sterile dressing was then applied by me.      Total Attempts: 1  Estimated Aspirated Blood Loss: 4cc  Estimated Total Blood Loss: 5cc  Placed to RIJ  Placed to hub , is a 16cm long catheter    There was a CXR portable obtained as the line was placed to the IJ vein. There was no PTX, the line was placed in to the RIJ and terminated in the RA. These procedures are not included  as a portion of any other charge, nor was their time included in any other measure of liza used for biolling, and they will accordingly be billed seperately.

## 2022-08-12 NOTE — PROGRESS NOTES
4 Eyes Skin Assessment    Kiet Corbin is being assessed upon: Admission    I agree that I, Marybeth Wright RN, along with Sai De La Paz (either 2 RN's or 1 LPN and 1 RN) have performed a thorough Head to Toe Skin Assessment on the patient. ALL assessment sites listed below have been assessed. Areas assessed by both nurses:     [x]   Head, Face, and Ears   [x]   Shoulders, Back, and Chest  [x]   Arms, Elbows, and Hands   [x]   Coccyx, Sacrum, and Ischium  [x]   Legs, Feet, and Heels    Does the Patient have Skin Breakdown?  No    Delvin Prevention initiated: Yes  Wound Care Orders initiated: No    [facility] nurse consulted for Pressure Injury (Stage 3,4, Unstageable, DTI, NWPT, and Complex wounds) and New or Established Ostomies: No        Primary Nurse eSignature: Marybeth Wright RN on 8/12/2022 at 12:57 AM      Co-Signer eSignature: Electronically signed by Seymour Rodriguez RN on 8/12/22 at 4:05 AM CDT

## 2022-08-12 NOTE — H&P
[facility] History and Physical    Patient Information:  Patient: Paola Mendieta  MRN: [id number]   Acct: [de-identified]  YOB: 1945  Admit Date: 8/11/2022      Primary Care Physician: Raghav Ornelas MD  Advance Directive: Full Code  Health Care Proxy: his wife        SUBJECTIVE:    Chief Complaint   Patient presents with    Fatigue    Hypotension     ED NP Sign Out:  CC: \"Fatigue, Hypotension\"  Hypotensive Shock of Unclear Etiology  Pt noted to be on Dopamine GGT for pressor support, EP does not have specific cause of shock in mind yet  Pt had TTE in last year with 45%EF and Grade 1 diastolic dysfunction, evidence of fluid volume overload as per last TTE noted  Acute on Chronic Hypoxemic RF, placed on NIPPV, used NIPPV QHS at baseline as per chart review  Hyperglycemia, Hypermagnesemia, Hyperphosphatemia, but Kalium normal at 3.8  Lactic Acidosis    HPI:  Mr. Jakub Chen is a pleasant 68year old  american gentleman from home. He has a history of DM,  on home O2 of 3LPM, and is dependant on a walker to get around. He has been progressively worsening over the last three days and having a harder time breathing. He has had chills and dyspnea at home but denied fever or painful cough. Review of Systems:   Review of Systems   Constitutional:  Positive for chills, diaphoresis and fatigue. Negative for fever. HAS malaise   Respiratory:  Positive for shortness of breath. Cardiovascular:  Negative for chest pain. Denied chest pressure   Gastrointestinal:  Negative for nausea. Neurological:  Positive for weakness and light-headedness. Negative for syncope. Psychiatric/Behavioral:  Positive for confusion. The following subjective sections were reviewed in [facility].     Past Medical History:   Diagnosis Date    Acute myocardial infarction of anterolateral wall, subsequent episode of care [facility]) 12/25/2011    AICD discharge     Allergies Arthritis     CAD (coronary artery disease) of artery bypass graft     Cardiomyopathy, ischemic 5/25/2011    Coronary atherosclerosis of native coronary artery     Elevated PSA     Hyperlipidemia     Hyperlipidemia     Cholesterol management per pcp, Diallo Fox M.D. Hypertension     ICD (implantable cardiac defibrillator) 5/25/2011    Rapid atrial fibrillation (HCC)     Shortness of breath     Type II or unspecified type diabetes mellitus without mention of complication, not stated as uncontrolled      Past Psychiatric History:  None on file    Past Surgical History:   Procedure Laterality Date    APPENDECTOMY      ARM SURGERY Left 10/14/2019    EXCISION LIPOMA LEFT ARM performed by Jessica Nesbitt MD at [address]  4/29/14  [facility]    2 vessel CAD.  EF 45%    CARDIAC PACEMAKER PLACEMENT      medtronic     CHOLECYSTECTOMY, LAPAROSCOPIC N/A 9/26/2016    CHOLECYSTECTOMY LAPAROSCOPIC performed by Sandeep Ewing MD at [facility]  01/03/2011    2V CABG (LIMA - LAD, SVG - PLM)    DIAGNOSTIC CARDIAC CATH LAB PROCEDURE  12/25/10    left heart cath, selective coronary arteriography, left ventriculography, direct infarct stenting to the left anterior descending coronary artery, left iliac arteriography, right iliac arteriography via left common femoral artery     FINGER AMPUTATION      traumatic left index finger    HERNIA REPAIR      ventral    MIDDLE EAR SURGERY Right 1966    tumor removed, some hearing loss     PACEMAKER PLACEMENT      MEDTRONIC PACEMAKER/DEFIB    MD COLONOSCOPY W/BIOPSY SINGLE/MULTIPLE N/A 4/3/2017    Dr JOSE Herrera-diverticular disease, benign ulcerated inflammatory polyp demonstrating a granulation tissue response--1 yr recall    MD EGD TRANSORAL BIOPSY SINGLE/MULTIPLE N/A 4/3/2017    Dr Yenni Mark hiatal hernia, gastritis, chronic inflammation     Social History       Tobacco History       Smoking Status  Never      Smokeless Tobacco Use  Former Quit Date  4/28/2014 Smokeless Tobacco Type  Chew              Alcohol History       Alcohol Use Status  No              Drug Use       Drug Use Status  No              Sexual Activity       Sexually Active  Not Asked             Family History   Adopted: Yes   Problem Relation Age of Onset    Diabetes Other     Heart Disease Other      Allergies   Allergen Reactions    Ambien [Zolpidem] Itching           Lortab [Hydrocodone-Acetaminophen] Other (See Comments)     One tab daily caused bowel obstruction    Phenergan  [Promethazine Hcl] Rash     Home Medications:  Prior to Admission medications    Medication Sig Start Date End Date Taking? Authorizing Provider   furosemide (LASIX) 40 MG tablet Take ONE (1) tablet by mouth daily 2/18/22   AMPARO Blackwell NP   metOLazone (ZAROXOLYN) 2.5 MG tablet Take ONE (1) tablet by mouth Twice A Week ON [address] [place] [address] 2/15/22   AMPARO Blackwell NP   polyethylene glycol [facility]) 17 GM/SCOOP powder Take 17 g by mouth daily    Historical Provider, MD   montelukast (SINGULAIR) 10 MG tablet Take 10 mg by mouth nightly    Historical Provider, MD   cetirizine (ZYRTEC ALLERGY) 10 MG tablet Zyrtec 10 mg tablet   Take 1 tablet every day by oral route.     Historical Provider, MD   OXYGEN Inhale 4 L into the lungs nightly     Historical Provider, MD   lisinopril (PRINIVIL;ZESTRIL) 5 MG tablet Take 1 tablet by mouth 2 times daily  Patient taking differently: Take 2.5 mg by mouth daily  8/12/21   Carmen Blood MD   Multiple Vitamins-Minerals (THERAPEUTIC MULTIVITAMIN-MINERALS) tablet Take 1 tablet by mouth daily    Historical Provider, MD   acetaminophen (TYLENOL) 325 MG tablet Take 650 mg by mouth every 6 hours as needed for Pain    Historical Provider, MD   aspirin EC 81 MG EC tablet Take 81 mg by mouth daily    Historical Provider, MD   nitroGLYCERIN (NITROSTAT) 0.4 MG SL tablet Place 1 tablet under the tongue every 5 minutes as needed for Chest pain 4/1/21   Bing Gilbert Wilma Loo MD   blood glucose test strips (EXACTECH TEST) strip USE 1 STRIP FOR BLOOD TEST TWICE A DAY ALTERNATING TIMES EACH DAY *STABLE INSULIN THERAPY*     DEC 86,2374    Historical Provider, MD   sotalol (BETAPACE) 80 MG tablet Take 80 mg by mouth 2 times daily     Historical Provider, MD   budesonide-formoterol (SYMBICORT) 160-4.5 MCG/ACT AERO Inhale 2 puffs into the lungs 2 times daily as needed     Historical Provider, MD   bumetanide (BUMEX) 2 MG tablet Take 2 mg by mouth three times a week Indications: Saturday and Tuesday Monday, Wednesday and Friday. Patient not taking: Reported on 5/26/2022    Historical Provider, MD   escitalopram (LEXAPRO) 10 MG tablet Take 10 mg by mouth daily    Historical Provider, MD   metFORMIN (GLUCOPHAGE) 500 MG tablet Take 500 mg by mouth 2 times daily (with meals)     Historical Provider, MD   PROAIR  (90 Base) MCG/ACT inhaler Inhale 2 puffs into the lungs as needed for Shortness of Breath  3/27/17   Historical Provider, MD   potassium chloride (KLOR-CON M) 20 MEQ extended release tablet Take 10 mEq by mouth daily  3/31/17   Historical Provider, MD   simvastatin (ZOCOR) 80 MG tablet Take 40 mg by mouth nightly     Historical Provider, MD   pantoprazole (PROTONIX) 40 MG tablet Take 40 mg by mouth daily. Historical Provider, MD   mirtazapine (REMERON SOL-TAB) 30 MG disintegrating tablet Take 30 mg by mouth nightly     Historical Provider, MD         OBJECTIVE:    Vitals:    08/12/22 0400   BP: 107/89   Pulse: (!) 121   Resp: 17   Temp:    SpO2: 95%   Breathing on BiPAP    /89   Pulse (!) 121   Temp 97.8 °F (36.6 °C) (Temporal)   Resp 17   Ht 5' 6\" (1.676 m)   Wt 184 lb (83.5 kg)   SpO2 95%   BMI 29.70 kg/m²       Intake/Output Summary (Last 24 hours) at 8/12/2022 0441  Last data filed at 8/12/2022 0400  Gross per 24 hour   Intake 170.65 ml   Output 400 ml   Net -229.35 ml     Physical Exam  Vitals reviewed.    Constitutional:       General: He is not in Oral Nightly    escitalopram  10 mg Oral Daily    atorvastatin  40 mg Oral Nightly    potassium chloride  10 mEq Oral Daily    pantoprazole  40 mg Oral Daily   &   aspirin EC  81 mg Oral Daily    therapeutic multivitamin-minerals  1 tablet Oral Daily    montelukast  10 mg Oral Nightly     Supoportive and Prophylactic Txx:  DVT Prophylaxis: Lovenox SQ  GI (PUD) Ppx: not indicated as such btu covered as per above  PT consult for evalutation and Txx as indicated: defer for now  Diet NPO Exceptions are: Sips of Water with Meds  nitroGLYCERIN, albuterol, glucose, dextrose bolus **OR** dextrose bolus, glucagon (rDNA), dextrose, sodium chloride flush, sodium chloride, ondansetron **OR** ondansetron, acetaminophen **OR** acetaminophen, sodium chloride flush, sodium chloride, potassium chloride **OR** potassium chloride, magnesium sulfate, sodium phosphate IVPB **OR** sodium phosphate IVPB **OR** sodium phosphate IVPB, polyethylene glycol, melatonin, calcium carbonate, polyethylene glycol      Critical Care time of >35 minutes  Pt seen/examined and admitted to inpatient status. Inpatient status is used for patients with an expected LOS extending past two midnights due to medical therapy and or critical care needs, otherwise patients are placed to OBServation status. Signed:  Electronically signed by Pablo Keene MD on 8/12/22 at 4:45 AM CDT.

## 2022-08-12 NOTE — CONSULTS
**Physician Signature**  This document was electronically signed by: Amber Gavin MD  2022   11:11 PM    **Consult Cover Page**  FROM: Manolo Rodas [address], [phone]  Call Back Number: [phone]  SUBJECT: Consult Recommendations  Date and Time of Report: 2022 11:11 PM CT  Items Contained in this Document: Critical Care [facility]    **Consult Information**  Member Facility: [address] [place] [address] MRN: [id number]  Visit/Encounter Number: [id number]  Consult ID: [id number]  Facility Time Zone: CT  Date and Time of Request: 2022 09:55 PM  CT  Requesting Clinician: DR. Lisa Perez  Patient Name: Judieth Koyanagi  YOB: 1945  Gender: Male  Patient identity was confirmed at the beginning of the consult with the   patient/family/staff using two personal identifiers: Patient name and       **Reason for Consult**  Reason for Consult: [facility]    **Admission**  Admission Date: 2022  Chief reason for ICU admission: Septic Shock    **Core Metrics**  General orienting sentence for patient: Pt was just admitted to limited   information available. unknown etiology, ?pneumonia. Placed on BiPAP 12/6 w/   15L. Trop elevated   0.14 (trending). Noted to be very hypotensive. On dopamine at   5.5.   Chief physiologic deterioration: MAP > 60 Pharmacologically enchanced  Is the patient on DVT prophylaxis?: Yes  Prophylaxis type: Pharmacological  Is the patient on GI prophylaxis?: Not Indicated  Has this patient reached their nutritional goal?: No and issues are being   addressed  Are there current issues with pain management in this patient?:   No  Are there issues with skin integrity?: No  Are there issues with delirium?: No  Has the patient been mobilized?: No  Is this patient currently intubated?: No  Are there ethical or care philosophy or family issues?: No  Family Issues Details: Full code  Do you recommend an in depth evaluation?: No  Disposition Recommendations: Continue ICU level of care    **Inserted/Removed Devices**  Device Name: Gutierrez Catheter  Site of insertion: Urethra  Date of insertion: 08-    **Physician Signature**  This document was electronically signed by: Jose Armando Kerns MD  08/11/2022   11:11 PM

## 2022-08-12 NOTE — PROGRESS NOTES
Patient passed away at 1250. Patient showed a rhythm, but no pulse was palpated or auscultated. Carlitos Torres RN and I used a doppler to try and find a pulse. Patient didn't have a pulse in radial, femoral, pedal, or carotid pulse. There was an absence of respiratory effort and blood pressure. MD and clinical house notified. All drips were stopped. All IV's and davis catheters were removed. Patient's family in the room. Family notified this nurse of wanting to use Marcene Horn and [employer].     Electronically signed by Vinita Justice RN on 8/12/2022 at 1:15 PM

## 2022-08-12 NOTE — PROGRESS NOTES
Patient condition changed at 0100. Hr is 130, SpO2 was down the 80. Dr. Jaya Hernandez come up and placed the CVA line. Patient put it on Norepinephrine. Also trying to winning of Dopamine at this time. Patient is on BIPAP 12/6 @ 15 lpm. HR is down the 115 at this time. Dr. Jaya Hernandez is monitoring at this time. Patient's family notified about the changes. Will continue to monitor the patient.

## 2022-08-12 NOTE — CONSULTS
[facility]  Cardiology Consult      Requesting MD:  Dariel Woo MD   Admit Status:         History obtained from:   [] Patient  [] Other (specify):     Patient:  Leigh Jackson  [id number]     Chief Complaint:   Chief Complaint   Patient presents with    Fatigue    Hypotension         HPI: 68year old  american gentleman from home. He has a history of DM,  on home O2 of 3LPM, and is dependant on a walker to get around. He has been progressively worsening over the last three days and having a harder time breathing. He has had chills and dyspnea at home but denied fever or painful cough    Pt has septic shock with renal failure, with abn troponin. No chest pain    Last cath 3/2017 svg to [facility] ok. [address] not good . Lad 60%. S/p icd. Pt not a cath candidate due to sepsis, renal failure. Review of Systems:  Review of Systems   Constitutional:  Positive for diaphoresis, fatigue and fever. Eyes: Negative. Respiratory:  Positive for shortness of breath. Cardiovascular:  Positive for leg swelling. Gastrointestinal: Negative. Endocrine: Negative. Genitourinary: Negative. Musculoskeletal: Negative. Allergic/Immunologic: Negative. Neurological: Negative. Hematological: Negative. Psychiatric/Behavioral: Negative.        Cardiac Specific Data:  Specialty Problems          Cardiology Problems    Coronary atherosclerosis of native coronary artery        Essential hypertension        Ischemic cardiomyopathy        Acute myocardial infarction of anterolateral wall, subsequent episode of care [facility])        Hyperlipidemia        Chronic systolic (congestive) heart failure (HCC)        Acute on chronic combined systolic and diastolic congestive heart failure (HCC)        Severe pulmonary hypertension (HCC)        PAF (paroxysmal atrial fibrillation) (HCC)           Past Medical History:  Past Medical History:   Diagnosis Date    Acute myocardial infarction of anterolateral wall, subsequent episode of care [facility]) 12/25/2011    AICD discharge     Allergies     Arthritis     CAD (coronary artery disease) of artery bypass graft     Cardiomyopathy, ischemic 5/25/2011    Coronary atherosclerosis of native coronary artery     Elevated PSA     Hyperlipidemia     Hyperlipidemia     Cholesterol management per pcp, Brendan Magaña M.D. Hypertension     ICD (implantable cardiac defibrillator) 5/25/2011    Rapid atrial fibrillation (HCC)     Shortness of breath     Type II or unspecified type diabetes mellitus without mention of complication, not stated as uncontrolled         Past Surgical History:  Past Surgical History:   Procedure Laterality Date    APPENDECTOMY      ARM SURGERY Left 10/14/2019    EXCISION LIPOMA LEFT ARM performed by Ed Vasquez MD at [place]   4/29/14  [facility]    2 vessel CAD.  EF 45%    CARDIAC PACEMAKER PLACEMENT      medtronic     CHOLECYSTECTOMY, LAPAROSCOPIC N/A 9/26/2016    CHOLECYSTECTOMY LAPAROSCOPIC performed by Fabian Jennings MD at [facility]  01/03/2011    2V CABG (LIMA - LAD, SVG - PLM)    DIAGNOSTIC CARDIAC CATH LAB PROCEDURE  12/25/10    left heart cath, selective coronary arteriography, left ventriculography, direct infarct stenting to the left anterior descending coronary artery, left iliac arteriography, right iliac arteriography via left common femoral artery     FINGER AMPUTATION      traumatic left index finger    HERNIA REPAIR      ventral    MIDDLE EAR SURGERY Right 1966    tumor removed, some hearing loss     PACEMAKER PLACEMENT      MEDTRONIC PACEMAKER/DEFIB    WA COLONOSCOPY W/BIOPSY SINGLE/MULTIPLE N/A 4/3/2017    Dr JOSE Herrera-diverticular disease, benign ulcerated inflammatory polyp demonstrating a granulation tissue response--1 yr recall    WA EGD TRANSORAL BIOPSY SINGLE/MULTIPLE N/A 4/3/2017    Dr Jake Romero hiatal hernia, gastritis, chronic inflammation       Past Family History:  Family History   Adopted: Yes   Problem Relation Age of Onset    Diabetes Other     Heart Disease Other        Past Social History:  Social History     Socioeconomic History    Marital status:      Spouse name: Not on file    Number of children: Not on file    Years of education: Not on file    Highest education level: Not on file   Occupational History    Occupation: retired   Tobacco Use    Smoking status: Never    Smokeless tobacco: Former     Types: Chew     Quit date: 4/28/2014   Vaping Use    Vaping Use: Never used   Substance and Sexual Activity    Alcohol use: No     Alcohol/week: 0.0 standard drinks    Drug use: No    Sexual activity: Not on file   Other Topics Concern    Not on file   Social History Narrative    Not on file     Social Determinants of Health     Financial Resource Strain: Not on file   Food Insecurity: Not on file   Transportation Needs: Not on file   Physical Activity: Not on file   Stress: Not on file   Social Connections: Not on file   Intimate Partner Violence: Not on file   Housing Stability: Not on file       Allergies: Allergies   Allergen Reactions    Ambien [Zolpidem] Itching           Lortab [Hydrocodone-Acetaminophen] Other (See Comments)     One tab daily caused bowel obstruction    Phenergan  [Promethazine Hcl] Rash       Home Meds:  Prior to Admission medications    Medication Sig Start Date End Date Taking?  Authorizing Provider   furosemide (LASIX) 40 MG tablet Take ONE (1) tablet by mouth daily 2/18/22   Ayesha Rings, APRN - NP   metOLazone (ZAROXOLYN) 2.5 MG tablet Take ONE (1) tablet by mouth Twice A Week ON [address] [place] [address] 2/15/22   Ayesha Rings, APRN - NP   polyethylene glycol [facility]) 17 GM/SCOOP powder Take 17 g by mouth daily    Historical Provider, MD   montelukast (SINGULAIR) 10 MG tablet Take 10 mg by mouth nightly    Historical Provider, MD   cetirizine (ZYRTEC ALLERGY) 10 MG tablet Zyrtec 10 mg tablet   Take 1 tablet every day by oral route. Historical Provider, MD   OXYGEN Inhale 4 L into the lungs nightly     Historical Provider, MD   lisinopril (PRINIVIL;ZESTRIL) 5 MG tablet Take 1 tablet by mouth 2 times daily  Patient taking differently: Take 2.5 mg by mouth daily  8/12/21   Kristin Nogueira MD   Multiple Vitamins-Minerals (THERAPEUTIC MULTIVITAMIN-MINERALS) tablet Take 1 tablet by mouth daily    Historical Provider, MD   acetaminophen (TYLENOL) 325 MG tablet Take 650 mg by mouth every 6 hours as needed for Pain    Historical Provider, MD   aspirin EC 81 MG EC tablet Take 81 mg by mouth daily    Historical Provider, MD   nitroGLYCERIN (NITROSTAT) 0.4 MG SL tablet Place 1 tablet under the tongue every 5 minutes as needed for Chest pain 4/1/21   Kristin Nogueira MD   blood glucose test strips (EXACTECH TEST) strip USE 1 STRIP FOR BLOOD TEST TWICE A DAY ALTERNATING TIMES EACH DAY *STABLE INSULIN THERAPY*     DEC 20,2475    Historical Provider, MD   sotalol (BETAPACE) 80 MG tablet Take 80 mg by mouth 2 times daily     Historical Provider, MD   budesonide-formoterol (SYMBICORT) 160-4.5 MCG/ACT AERO Inhale 2 puffs into the lungs 2 times daily as needed     Historical Provider, MD   bumetanide (BUMEX) 2 MG tablet Take 2 mg by mouth three times a week Indications: Saturday and Tuesday Monday, Wednesday and Friday.   Patient not taking: Reported on 5/26/2022    Historical Provider, MD   escitalopram (LEXAPRO) 10 MG tablet Take 10 mg by mouth daily    Historical Provider, MD   metFORMIN (GLUCOPHAGE) 500 MG tablet Take 500 mg by mouth 2 times daily (with meals)     Historical Provider, MD   PROAIR  (90 Base) MCG/ACT inhaler Inhale 2 puffs into the lungs as needed for Shortness of Breath  3/27/17   Historical Provider, MD   potassium chloride (KLOR-CON M) 20 MEQ extended release tablet Take 10 mEq by mouth daily  3/31/17   Historical Provider, MD   simvastatin (ZOCOR) 80 MG tablet Take 40 mg by mouth nightly     Historical Provider, MD   pantoprazole (PROTONIX) 40 MG tablet Take 40 mg by mouth daily. Historical Provider, MD   mirtazapine (REMERON SOL-TAB) 30 MG disintegrating tablet Take 30 mg by mouth nightly     Historical Provider, MD       Current Meds:   insulin lispro  0-4 Units SubCUTAneous TID WC    insulin lispro  0-4 Units SubCUTAneous Nightly    aspirin EC  81 mg Oral Daily    therapeutic multivitamin-minerals  1 tablet Oral Daily    montelukast  10 mg Oral Nightly    budesonide  0.5 mg Nebulization Q12H    Arformoterol Tartrate  15 mcg Nebulization BID    polyethylene glycol  17 g Oral Daily    mirtazapine  30 mg Oral Nightly    escitalopram  10 mg Oral Daily    atorvastatin  40 mg Oral Nightly    potassium chloride  10 mEq Oral Daily    pantoprazole  40 mg Oral Daily    cefTRIAXone (ROCEPHIN) IV  2,000 mg IntraVENous Q24H    metroNIDAZOLE  500 mg IntraVENous Q8H    azithromycin  250 mg IntraVENous Q24H       Current Infused Meds:   norepinephrine 100 mcg/min (08/12/22 1042)    heparin (PORCINE) Infusion 8 Units/kg/hr (08/12/22 1117)    phenylephrine (YURY-SYNEPHRINE) 50mg/250mL infusion      dextrose      sodium chloride         Physical Exam:  Vitals:    08/12/22 1000   BP: (!) 102/54   Pulse: (!) 119   Resp: 12   Temp:    SpO2: 100%       Intake/Output Summary (Last 24 hours) at 8/12/2022 1301  Last data filed at 8/12/2022 1011  Gross per 24 hour   Intake 1656.02 ml   Output 710 ml   Net 946.02 ml     Estimated body mass index is 29.7 kg/m² as calculated from the following:    Height as of this encounter: 5' 6\" (1.676 m). Weight as of this encounter: 184 lb (83.5 kg). Physical Exam  Constitutional:       Appearance: He is ill-appearing. HENT:      Head: Normocephalic and atraumatic. Nose: Nose normal.      Mouth/Throat:      Mouth: Mucous membranes are moist.   Eyes:      Pupils: Pupils are equal, round, and reactive to light. Cardiovascular:      Rate and Rhythm: Regular rhythm.  Tachycardia present. Pulses: Normal pulses. Heart sounds: Normal heart sounds. Pulmonary:      Breath sounds: Wheezing and rhonchi present. Abdominal:      General: Abdomen is flat. Musculoskeletal:         General: Normal range of motion. Cervical back: Normal range of motion. Neurological:      General: No focal deficit present.   s    Labs:  Recent Labs     08/11/22  1710 08/12/22  0520   WBC 9.5 10.4   HGB 17.9 19.0*    171       Recent Labs     08/11/22  1736 08/11/22  1857 08/12/22  0133 08/12/22  0315   NA  --   --  134*  --    K 4.3  --  4.7 4.7   CL  --   --  93*  --    CO2  --   --  24  --    BUN  --   --  101*  --    CREATININE  --   --  3.9*  --    LABGLOM  --   --  15*  --    MG  --  2.5*  --   --    CALCIUM  --   --  10.1  --    PHOS  --  5.4*  --   --        CK, CKMB, Troponin: @LABRCNT (CKTOTAL:3, CKMB:3, TROPONINI:3)@    Last 3 BNP:  No results for input(s): BNP in the last 72 hours. IMAGING:  XR CHEST PORTABLE    Result Date: 8/12/2022  NO PRIOR REPORT AVAILABLE Exam: X-RAY OF [facility] Clinical data:Central line placement verification. Technique:Single view of the chest. Prior studies: Radiograph of the chest dated 08/11/2022. Findings: Right central venous line is seen with its tip at the expected cavo-atrial junction. Stable patchy alveolar opacities in left paracardiac region. Rest of the lungs are grossly clear; noevidence of pleural effusion. Borderline cardiomegaly. Median sternotomy wires noted. Cardiac pacemaker/ AICD noted. No acute osseous abnormality is detected. Monitoring leads are seen overlyning the chest.    1. Adequate positioning of right central venous line. 2. Stable patchy alveolar opacities in left paracardiac region. Recommendation: Follow up as clinically indicated.  Electronically Signed by Mandi Long MD at 12-Aug-2022 06:51:03 AM             XR CHEST PORTABLE    Result Date: 8/11/2022  NO PRIOR REPORT AVAILABLE Exam: X-RAY OF [facility] Clinical both kidneys are too small to definitively characterize but likely cysts. Trace perinephric fluid, nonspecific. No hydronephrosis. Limited evaluation of bowel secondary to peristalsis; however, no definitive bowel abnormality is detected. No evidence of upper abdominal ascites or mesenteric mass. Motion artifact throughout the exam.    1. No focal hepatic abnormality is detected on noncontrast evaluation. 2. No evidence of biliar ductal dilation, status post cholecystectomy. 3. Probable tiny cysts in the kidneys, with no definitive evidence of an angiomyolipoma; the abnormality on recent ultrasound could be due to a tiny proteinaceous or hemorrhagic cyst. Recommendation: Follow up as clinically indicated; consider CT without and with IV contrast.          Electronically Signed by Taco Jiang MD at 20-Jul-2022 01:31:31 PM               Assessment:  Septic shock. Abn troponin  CHF  CA/CABG  ICD    Plan: Iv diuresis  Consider palliative consult given poor prognosis with multiple comorbid issues  No indication for cath. Medical mgmt.       Recommendations:

## 2022-08-12 NOTE — PLAN OF CARE
Problem: Discharge Planning  Goal: Discharge to home or other facility with appropriate resources  Outcome: Completed     Problem: Safety - Adult  Goal: Free from fall injury  Outcome: Completed     Problem: ABCDS Injury Assessment  Goal: Absence of physical injury  Outcome: Completed     Problem: Chronic Conditions and Co-morbidities  Goal: Patient's chronic conditions and co-morbidity symptoms are monitored and maintained or improved  Outcome: Completed     Problem: Skin/Tissue Integrity  Goal: Absence of new skin breakdown  Description: 1. Monitor for areas of redness and/or skin breakdown  2. Assess vascular access sites hourly  3. Every 4-6 hours minimum:  Change oxygen saturation probe site  4. Every 4-6 hours:  If on nasal continuous positive airway pressure, respiratory therapy assess nares and determine need for appliance change or resting period.   Outcome: Completed     Problem: Pain  Goal: Verbalizes/displays adequate comfort level or baseline comfort level  Outcome: Completed

## 2022-08-12 NOTE — ED PROVIDER NOTES
[address] [facility],[address]  eMERGENCY dEPARTMENT eNCOUnter      Pt Name: Flower Grullon  MRN: [id number]  [place] 1945  Date of evaluation: 8/11/2022  Provider: Carole Britt MD    [address] [place] [address]       Chief Complaint   Patient presents with    Fatigue    Hypotension       Seen independently of NP. Pt with CHF presents with hypotension and hypoxia. On bipap with some improvement. Admit to icu with hospitalist.       PHYSICAL EXAM    (up to 7 for level 4, 8 or more for level 5)     ED Triage Vitals   BP Temp Temp Source Heart Rate Resp SpO2 Height Weight   08/11/22 1642 08/11/22 1642 08/11/22 1642 08/11/22 1645 08/11/22 1642 08/11/22 1642 08/12/22 0000 08/11/22 1908   80/62 97.9 °F (36.6 °C) Oral 65 16 (!) 81 % 5' 6\" (1.676 m) 185 lb (83.9 kg)       Physical Exam    DIAGNOSTIC RESULTS     EKG: All E[facility]'s [facility] who either signs or Co-signs this chart in the absence of a cardiologist.        RADIOLOGY:   Non-plain film images such as CT, Ultrasound and MRI are read by the radiologist. Plain radiographic images are visualized and preliminarily interpreted by the emergencyphysician with the below findings:        Interpretation per the Radiologist below, if available at the time of thisnote:    XR CHEST PORTABLE   Final Result   1. Adequate positioning of right central venous line. 2. Stable patchy alveolar opacities in left paracardiac region. Recommendation: Follow up as clinically indicated. Electronically Signed by Dalia Stanton MD at 12-Aug-2022 06:51:03 AM               XR CHEST PORTABLE   Final Result   Aerated lung volumes are normal.   Borderline cardiomegaly. Subtle alveolar haziness noted in the right paracardiac and lateral lower left lung. Findings may suggest early or borderline moderate edema. Consider a viral respiratory illness in the appropriate clinical setting. No sizable pleural effusion is apparent. Status post median sternotomy. Left-sided cardiac pacemaker/AICD device. Recommendation:    Follow up as clinically indicated.    Electronically Signed by Vanessa Valenzuela MD at 11-Aug-2022 07:25:11 PM                     ED BEDSIDE ULTRASOUND:   Performed by ED Physician - none    LABS:  Labs Reviewed   CBC WITH AUTO DIFFERENTIAL - Abnormal; Notable for the following components:       Result Value    RBC 6.31 (*)     Hematocrit 56.4 (*)     MCHC 31.7 (*)     RDW 16.3 (*)     Neutrophils % 80.3 (*)     Lymphocytes % 11.6 (*)     Neutrophils Absolute 7.6 (*)     All other components within normal limits   BRAIN NATRIURETIC PEPTIDE - Abnormal; Notable for the following components:    Pro-BNP 28,166 (*)     All other components within normal limits   TROPONIN - Abnormal; Notable for the following components:    Troponin 0.06 (*)     All other components within normal limits   LACTIC ACID - Abnormal; Notable for the following components:    Lactic Acid 3.0 (*)     All other components within normal limits    Narrative:     CALL  [facility]  KLED tel. ,  Chemistry results called to and read back by Abi López, 08/11/2022 17:49,  by Jennifer Blanco   BLOOD GAS, ARTERIAL - Abnormal; Notable for the following components:    pH, Arterial 7.490 (*)     pCO2, Arterial 27.0 (*)     pO2, Arterial 49.0 (*)     HCO3, Arterial 20.6 (*)     O2 Sat, Arterial 87.2 (*)     All other components within normal limits    Narrative:     CALL  [facility]  KLED tel. ,  hoa mcnally np, 08/11/2022 17:40, by LAURA   LACTIC ACID - Abnormal; Notable for the following components:    Lactic Acid 2.5 (*)     All other components within normal limits    Narrative:     CALL  [facility]  KLCCU tel. ,  Chemistry results called to and read back by [facility] BING HESS in CCU, 08/12/2022  03:05, by [facility]  Collection has been rescheduled by Brian Hews at 08/11/2022 22:05 Reason: Pt   in process of gettin transferred  Collection has been rescheduled by Brian Hews at 08/11/2022 22:06 Reason:   Tranferred to floor  Collection has been rescheduled by Lamar Cabrera at 08/12/2022 00:08 Reason:   Second attempt failed, notified nurse for a physician collection Second   attempt failed, notified nurse for a physician collection   TROPONIN - Abnormal; Notable for the following components:    Troponin 0.14 (*)     All other components within normal limits    Narrative:     [facility]aines tel. ,  Chemistry results called to and read back by Liss Cobian, 08/11/2022 20:00,  by Cheryl Schreiber - Abnormal; Notable for the following components:    Magnesium 2.5 (*)     All other components within normal limits   PHOSPHORUS - Abnormal; Notable for the following components:    Phosphorus 5.4 (*)     All other components within normal limits   LIPID PANEL - Abnormal; Notable for the following components:    Triglycerides 253 (*)     HDL 40 (*)     All other components within normal limits    Narrative:     CALL  [facility]  KLE[facility]PE tel. ,  Chemistry results called to and read back by [facility], RN KLED, 08/11/2022 20:00,  by Harsha Wagoner A1C - Abnormal; Notable for the following components:    Hemoglobin A1C 8.0 (*)     All other components within normal limits   URINALYSIS WITH REFLEX TO CULTURE - Abnormal; Notable for the following components:    Color, UA DARK YELLOW (*)     Bilirubin Urine SMALL (*)     Ketones, Urine TRACE (*)     Protein,  (*)     All other components within normal limits   MICROSCOPIC URINALYSIS - Abnormal; Notable for the following components:    Bacteria, UA NEGATIVE (*)     Crystals, UA NEG (*)     RBC, UA 6 (*)     All other components within normal limits   TROPONIN - Abnormal; Notable for the following components:    Troponin 0.17 (*)     All other components within normal limits    Narrative:     [facility] tel. ,  Chemistry results called to and read back by [facility] BING HESS in CCU, 08/12/2022  03:05, by Catherine Miles has been rescheduled by Lamar Cabrera at 08/12/2022 00:08 Reason:   Second attempt failed, notified nurse for a physician aPTT >200.0 (*)     All other components within normal limits    Narrative:     CALL  [facility]  KL[facility]U tel. ,  Coag results called to and read back by [facility] RN ICU., 08/12/2022 07:30, by  Lisa Segura   APTT - Abnormal; Notable for the following components:    aPTT >200.0 (*)     All other components within normal limits    Narrative:     [facility] tel. ,  Hematology results called to and read back by Donovan Wray rn ccu , 08/12/2022  09:21, by Lisa Segura  Collection has been rescheduled by Josie Richard at 08/12/2022 08:28 Reason: JIMENA Franco is drawing from line    CULTURE, BLOOD 1    Narrative:     ORDER#: [id number]                          ORDERED BY: ROB TAY  SOURCE: Blood LAC                          COLLECTED:  08/11/22 17:10  ANTIBIOTICS AT [facility].:                      RECEIVED :  08/11/22 17:18   CULTURE, BLOOD 2    Narrative:     ORDER#: [id number]                          ORDERED BY: ROB TAY  SOURCE: Blood Antecubital-Lef              COLLECTED:  08/11/22 17:36  ANTIBIOTICS AT [facility].:                      RECEIVED :  08/11/22 17:41   COVID-19, RAPID   RESPIRATORY PANEL, MOLECULAR, WITH COVID-19   STREP PNEUMONIAE ANTIGEN    Narrative:     ORDER#: [id number]                          ORDERED BY: Angus Dent  SOURCE: Urine Clean Catch                  COLLECTED:  08/11/22 20:32  ANTIBIOTICS AT [facility].:                      RECEIVED :  08/11/22 20:35   LEGIONELLA ANTIGEN, URINE    Narrative:     ORDER#: [id number]                          ORDERED BY: Angus Dent  SOURCE: [email or web address]                              COLLECTED:  08/11/22 20:32  ANTIBIOTICS AT [facility].:                      RECEIVED :  08/11/22 20:35   SPECIMEN REJECTION   PROCALCITONIN    Narrative:     CALL  [facility]  KLED tel. ,  Chemistry results called to and read back by Prosper Box, 08/11/2022 20:00,  by Sammy Cancino   TSH WITH REFLEX TO FT4   LACTIC ACID       All other labs were within normal range or not returned as of this dictation.     EMERGENCY DEPARTMENT COURSE and DIFFERENTIAL DIAGNOSIS/MDM:   Vitals:    Vitals:    08/12/22 1000 08/12/22 1100 08/12/22 1200 08/12/22 1228   BP: (!) 102/54 (!) 90/39 81/61    Pulse: (!) 119 (!) 128 (!) 117 (!) 116   Resp: 12 18 20 30   Temp:   97.5 °F (36.4 °C)    TempSrc:   Temporal    SpO2: 100% 91% 94% (!) 78%   Weight:       Height:           MDM        CONSULTS:  IP CONSULT TO CARDIOLOGY  IP CONSULT TO PALLIATIVE CARE  IP CONSULT TO NEPHROLOGY    PROCEDURES:  Unless otherwise noted below, none      Procedures    FINAL IMPRESSION      1. Acute on chronic congestive heart failure, unspecified heart failure type ([address]    2. Severe pulmonary hypertension ([address]    3.  Respiratory failure, unsp, unsp w hypoxia or hypercapnia (HCC)    4. Elevated troponin          DISPOSITION/PLAN   DISPOSITION Admitted    PATIENT REFERRED TO:  Kendal Uriarte MD  [address] [facility]  [phone]    Follow up on 9/1/2022  10:45 am      DISCHARGE MEDICATIONS:  Discharge Medication List as of 8/12/2022  3:39 PM             (Please note that portions of this note were completed with a voice recognition program.  Efforts were made to edit the dictations but occasionally words aremis-transcribed.)    Meghan Alexander MD (electronically signed)  Attending Emergency Physician           Meghan Alexander MD  08/15/22 [facility]

## 2022-08-12 NOTE — PROGRESS NOTES
Anjel Santacruz arrived to room # 226 1366. Presented with: Elevated troponin, SOB, Septic shock   Mental Status: Patient is oriented, alert, coherent, logical, thought processes intact, and able to concentrate and follow conversation. Vitals:    08/12/22 0000   BP: 117/84   Pulse: 100   Resp: 18   Temp: 97.8 °F (36.6 °C)   SpO2: 96%     Patient safety contract and falls prevention contract reviewed with patient Yes. Oriented Patient to room. Call light within reach. Yes.   Needs, issues or concerns expressed at this time: no.      Electronically signed by Neel Nguyen RN on 8/12/2022 at 12:56 AM

## 2022-08-13 LAB
EKG P AXIS: NORMAL DEGREES
EKG P-R INTERVAL: NORMAL MS
EKG Q-T INTERVAL: 468 MS
EKG QRS DURATION: 152 MS
EKG QTC CALCULATION (BAZETT): 478 MS
EKG T AXIS: 60 DEGREES

## 2022-08-13 PROCEDURE — 93010 ELECTROCARDIOGRAM REPORT: CPT | Performed by: INTERNAL MEDICINE

## 2022-08-13 NOTE — PROGRESS NOTES
Physician Progress Note      PATIENT:               Leilani Blevins  CSN #:                  [id number]  :                       1945  ADMIT DATE:       2022 4:44 PM  [address] [place] DATE:        2022 12:50 PM  RESPONDING  PROVIDER #:        Cecille Garner MD          QUERY TEXT:    Pt admitted with Shock and Resp. Failure and has CHF documented. If possible,   please document in progress notes and discharge summary further specificity   regarding the type and acuity of CHF:    The medical record reflects the following:  Risk Factors: CHF, HTN  Clinical Indicators: H&P states \"had TTE in last year with 45% EF and Kel 1   diastolic dysfunction, evidence of fluid overload\", BNP: 28,166, ABG: ABG: PH:   7.490, PCO2: 27, Po2: 49,  Treatment: Lasix Drip, BIPAP, Bumex 2mg PO    Thank you in advance,    Guille Bean, RN-BSN, [facility]  Clinical   [facility], [address] [place] [address] [email or web address]  Options provided:  -- Acute on Chronic Systolic CHF/HFrEF  -- Acute on Chronic Diastolic CHF/HFpEF  -- Acute on Chronic Systolic and Diastolic CHF  -- Acute Systolic CHF/HFrEF  -- Acute Diastolic CHF/HFpEF  -- Acute Systolic and Diastolic CHF  -- Chronic Systolic CHF/HFrEF  -- Chronic Diastolic CHF/HFpEF  -- Chronic Systolic and Diastolic CHF  -- Other - I will add my own diagnosis  -- Disagree - Not applicable / Not valid  -- Disagree - Clinically unable to determine / Unknown  -- Refer to Clinical Documentation Reviewer    PROVIDER RESPONSE TEXT:    This patient is in acute on chronic systolic and diastolic CHF.     Query created by: Tawanna Mendoza on 2022 9:54 AM      Electronically signed by:  Cecille Garner MD 2022 1:36 AM

## 2022-08-13 NOTE — DISCHARGE SUMMARY
Hospitalist Discharge Summary  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 All questions sought and answered. They have requested DNR code status. Further decisions pending clinical course. Palliative care consulted in this regard. Darren Ugalde RN witnessed the entire discussion. DNR status. Notified by staff that patient  on 2022 at 1250. Family notified. Discharge Exam:   Patient     Recent Labs     22  1710 22  0520   WBC 9.5 10.4   HGB 17.9 19.0*    171     Recent Labs     22  1736 22  0133 22  0315   NA  --  134*  --    K 4.3 4.7 4.7   CL  --  93*  --    CO2  --  24  --    BUN  --  101*  --    CREATININE  --  3.9*  --    GLUCOSE  --  173*  --      Recent Labs     22   *   ALT 66*   BILITOT 1.5*   ALKPHOS 394*     Troponin T:   Recent Labs     22  0133 22  0429 22  0520   TROPONINI 0.17* 0.24* 0.25*     Pro-BNP: No results for input(s): BNP in the last 72 hours. INR: No results for input(s): INR in the last 72 hours.   UA:  Recent Labs     22   COLORU DARK YELLOW*   PHUR 5.0   WBCUA 1   RBCUA 6*   BACTERIA NEGATIVE*   CLARITYU Clear   SPECGRAV 1.020   LEUKOCYTESUR Negative   UROBILINOGEN 1.0   BILIRUBINUR SMALL*   BLOODU Negative   GLUCOSEU Negative     A1C:   Recent Labs     08/11/22  1710   LABA1C 8.0*     ABG:  Recent Labs     08/12/22  0315   PHART 7.460*   ZAY6KWJ 20.0*   PO2ART 49.0*   ECM3GPP 14.2*   BEART -6.3*   HGBAE 18.7*   O0LDIWQR 85.7*   CARBOXHGBART 1.2       XR CHEST PORTABLE    Result Date: 8/12/2022  1. Adequate positioning of right central venous line. 2. Stable patchy alveolar opacities in left paracardiac region. Recommendation: Follow up as clinically indicated. Electronically Signed by Aroldo Gupta MD at 12-Aug-2022 06:51:03 AM             XR CHEST PORTABLE    Result Date: 8/11/2022  Aerated lung volumes are normal. Borderline cardiomegaly. Subtle alveolar haziness noted in the right paracardiac and lateral lower left lung. Findings may suggest early or borderline moderate edema. Consider a viral respiratory illness in the appropriate clinical setting. No sizable pleural effusion is apparent. Status post median sternotomy. Left-sided cardiac pacemaker/AICD device. Recommendation: Follow up as clinically indicated.  Electronically Signed by David Lomeli MD at 11-Aug-2022 07:25:11 PM              Discharge Disposition: Aletha Wong MD  8/12/2022 7:19 PM

## 2022-08-16 LAB
BLOOD CULTURE, ROUTINE: NORMAL
CULTURE, BLOOD 2: NORMAL

## 2022-08-17 NOTE — PROGRESS NOTES
Physician Progress Note      PATIENT:               Airam Kelly  CSN #:                  [id number]  :                       1945  ADMIT DATE:       2022 4:44 PM  [address] [place] DATE:        2022 12:50 PM  RESPONDING  PROVIDER #:        Jena Ahmadi MD          QUERY TEXT:    Pt admitted with Acute Resp. Failure and has \"shock\" documented. If possible,   please document in the progress notes and discharge summary if you are   evaluating and /or treating any of the following: The medical record reflects the following:  Risk Factors: Hypotension, CHF, HTN  Clinical Indicators: WBC: 10.4, Lactic 3.0, TROPS: 0.06, 0.14, 0.17, 0.24,   80/62 on arrival with SPo2: 81%, ICU TELEMED doctor states \"septic Shock\", H&P   states \"hypotension shock\"  Treatment: Lasix Drip, Dopamine, Levophed Drip    Thank you in advance,    Verito Yañez RN-BSN, [facility]  Clinical   [facility], [facility] [email or web address]  Options provided:  -- Sepsis, present on admission  -- Sepsis, present on admission, now resolved  -- Sepsis was ruled out  -- Other - I will add my own diagnosis  -- Disagree - Not applicable / Not valid  -- Disagree - Clinically unable to determine / Unknown  -- Refer to Clinical Documentation Reviewer    PROVIDER RESPONSE TEXT:    He was diagnosed with sepsis and had hypotensive shock  Stage 1-2 shock you do not yet have hypotension, so i have documented already   that this patient is in at least [address] (in other words it is   SEVERE SEPTIC SHOCK)    Query created by: Marguerite Avalos on 2022 1:56 PM      Electronically signed by:  Jena Ahmadi MD 2022 9:47 PM

## (undated) DEVICE — CHLORAPREP 26ML ORANGE

## (undated) DEVICE — GLOVE SURG SZ 7 CRM LTX FREE POLYISOPRENE POLYMER BEAD ANTI

## (undated) DEVICE — PAD,ARMBOARD,CONV,FOAM,2X8X20",12PR/CS: Brand: MEDLINE

## (undated) DEVICE — GLOVE SURG SZ 6 CRM LTX FREE POLYISOPRENE POLYMER BEAD ANTI

## (undated) DEVICE — FORCEPS BX L240CM DIA2.4MM L NDL RAD JAW 4 133340

## (undated) DEVICE — YANKAUER,TAPERED BULBOUS TIP,W/O VENT: Brand: MEDLINE

## (undated) DEVICE — GOWN,PREVENTION PLUS,XL,ST,24/CS: Brand: MEDLINE

## (undated) DEVICE — SOLUTION IV IRRIG POUR BRL 0.9% SODIUM CHL 2F7124

## (undated) DEVICE — SNARE POLYP SM W13MMXL240CM SHTH DIA2.4MM OVL FLX DISP

## (undated) DEVICE — BANDAGE,GAUZE,4.5"X4.1YD,STERILE,LF: Brand: MEDLINE

## (undated) DEVICE — ROYAL SILK SURGICAL GOWN, XXL: Brand: CONVERTORS

## (undated) DEVICE — RETRIEVER SPEC L230CM DIA2.5MM POLYPR FOR TISS RETRV ROTH

## (undated) DEVICE — PACK,UNIVERSAL,NO GOWNS: Brand: MEDLINE

## (undated) DEVICE — GLOVE SURG SZ 7 L12IN FNGR THK79MIL GRN LTX FREE

## (undated) DEVICE — Device

## (undated) DEVICE — DRAPE,UTILITY,XL,4/PK,STERILE: Brand: MEDLINE

## (undated) DEVICE — ADHESIVE SKIN CLSR 0.7ML TOP DERMBND ADV

## (undated) DEVICE — MINOR CDS: Brand: MEDLINE INDUSTRIES, INC.

## (undated) DEVICE — GOWN,PREVENTION PLUS,L,ST,24/CS: Brand: MEDLINE

## (undated) DEVICE — GLOVE SURG SZ 65 L12IN FNGR THK79MIL GRN LTX FREE

## (undated) DEVICE — SUTURE VCRL SZ 3-0 L36IN ABSRB UD L36MM CT-1 1/2 CIR J944H

## (undated) DEVICE — SUTURE MCRYL SZ 3-0 L18IN ABSRB UD L19MM PS-2 3/8 CIR PRIM Y497G

## (undated) DEVICE — GLOVE SURG SZ 7 L12IN FNGR THK94MIL TRNSLUC YEL LTX HYDRGEL

## (undated) DEVICE — ENDO KIT,LOURDES HOSPITAL: Brand: MEDLINE INDUSTRIES, INC.